# Patient Record
Sex: FEMALE | Race: WHITE | NOT HISPANIC OR LATINO | Employment: OTHER | URBAN - NONMETROPOLITAN AREA
[De-identification: names, ages, dates, MRNs, and addresses within clinical notes are randomized per-mention and may not be internally consistent; named-entity substitution may affect disease eponyms.]

---

## 2017-08-02 ENCOUNTER — TRANSCRIBE ORDERS (OUTPATIENT)
Dept: ADMINISTRATIVE | Facility: HOSPITAL | Age: 67
End: 2017-08-02

## 2017-08-02 DIAGNOSIS — N63.0 LUMP OR MASS IN BREAST: Primary | ICD-10-CM

## 2017-08-09 ENCOUNTER — HOSPITAL ENCOUNTER (OUTPATIENT)
Dept: MAMMOGRAPHY | Facility: HOSPITAL | Age: 67
Discharge: HOME | End: 2017-08-09
Attending: FAMILY MEDICINE | Admitting: FAMILY MEDICINE

## 2017-08-09 ENCOUNTER — HOSPITAL ENCOUNTER (OUTPATIENT)
Dept: ULTRASOUND IMAGING | Facility: HOSPITAL | Age: 67
Discharge: HOME | End: 2017-08-09
Attending: FAMILY MEDICINE

## 2017-08-09 DIAGNOSIS — N63.0 LUMP OR MASS IN BREAST: ICD-10-CM

## 2017-08-09 PROCEDURE — 76642 ULTRASOUND BREAST LIMITED: CPT

## 2017-08-09 PROCEDURE — G0204 DX MAMMO INCL CAD BI: HCPCS

## 2017-08-09 PROCEDURE — G0279 TOMOSYNTHESIS, MAMMO: HCPCS

## 2017-08-18 ENCOUNTER — HOSPITAL ENCOUNTER (OUTPATIENT)
Age: 67
Setting detail: OUTPATIENT SURGERY
Discharge: HOME | End: 2017-08-18
Attending: ORTHOPAEDIC SURGERY | Admitting: ORTHOPAEDIC SURGERY

## 2017-08-18 ENCOUNTER — HOSPITAL ENCOUNTER (OUTPATIENT)
Dept: GENERAL RADIOLOGY | Age: 67
Discharge: HOME | End: 2017-08-18

## 2017-08-18 VITALS
SYSTOLIC BLOOD PRESSURE: 136 MMHG | BODY MASS INDEX: 28.71 KG/M2 | OXYGEN SATURATION: 94 % | HEIGHT: 62 IN | WEIGHT: 156 LBS | RESPIRATION RATE: 20 BRPM | DIASTOLIC BLOOD PRESSURE: 82 MMHG | HEART RATE: 71 BPM

## 2017-08-18 DIAGNOSIS — R52 PAIN: ICD-10-CM

## 2017-08-18 PROBLEM — M16.0 PRIMARY OSTEOARTHRITIS OF BOTH HIPS: Status: ACTIVE | Noted: 2017-08-18

## 2017-08-18 PROCEDURE — 3209999900 FLUORO FOR SURGICAL PROCEDURES

## 2017-08-18 PROCEDURE — 20610 DRAIN/INJ JOINT/BURSA W/O US: CPT

## 2017-08-18 PROCEDURE — G8918 PT W/O PREOP ORDER IV AB PRO: HCPCS

## 2017-08-18 PROCEDURE — G8907 PT DOC NO EVENTS ON DISCHARG: HCPCS

## 2017-08-18 RX ORDER — BUPIVACAINE HYDROCHLORIDE 5 MG/ML
INJECTION, SOLUTION EPIDURAL; INTRACAUDAL PRN
Status: DISCONTINUED | OUTPATIENT
Start: 2017-08-18 | End: 2017-08-18 | Stop reason: HOSPADM

## 2017-08-18 RX ORDER — LIDOCAINE HYDROCHLORIDE 10 MG/ML
INJECTION, SOLUTION INFILTRATION; PERINEURAL PRN
Status: DISCONTINUED | OUTPATIENT
Start: 2017-08-18 | End: 2017-08-18 | Stop reason: HOSPADM

## 2017-08-18 RX ORDER — MODAFINIL 200 MG/1
200 TABLET ORAL DAILY
COMMUNITY
End: 2022-01-10

## 2017-08-18 RX ORDER — QUETIAPINE FUMARATE 200 MG/1
200 TABLET, FILM COATED ORAL 2 TIMES DAILY
COMMUNITY
End: 2022-08-17

## 2017-08-18 RX ORDER — NICOTINE POLACRILEX 4 MG/1
40 GUM, CHEWING ORAL DAILY
COMMUNITY

## 2017-08-18 RX ORDER — MONTELUKAST SODIUM 10 MG/1
10 TABLET ORAL NIGHTLY
COMMUNITY
End: 2022-08-17

## 2017-08-18 RX ORDER — OXYCODONE AND ACETAMINOPHEN 7.5; 325 MG/1; MG/1
1 TABLET ORAL EVERY 4 HOURS PRN
COMMUNITY

## 2017-08-18 RX ORDER — AZELASTINE 1 MG/ML
1 SPRAY, METERED NASAL 2 TIMES DAILY
COMMUNITY
End: 2022-01-10

## 2017-08-18 RX ORDER — GABAPENTIN 300 MG/1
300 CAPSULE ORAL DAILY
COMMUNITY
End: 2022-08-17

## 2017-08-18 RX ORDER — BETAMETHASONE SODIUM PHOSPHATE AND BETAMETHASONE ACETATE 3; 3 MG/ML; MG/ML
INJECTION, SUSPENSION INTRA-ARTICULAR; INTRALESIONAL; INTRAMUSCULAR; SOFT TISSUE PRN
Status: DISCONTINUED | OUTPATIENT
Start: 2017-08-18 | End: 2017-08-18 | Stop reason: HOSPADM

## 2017-08-18 RX ORDER — CYCLOBENZAPRINE HCL 5 MG
5 TABLET ORAL 3 TIMES DAILY PRN
COMMUNITY
End: 2022-08-17 | Stop reason: ALTCHOICE

## 2017-08-18 RX ORDER — LISINOPRIL 20 MG/1
20 TABLET ORAL DAILY
COMMUNITY
End: 2022-01-10

## 2017-08-18 RX ORDER — MESALAMINE 800 MG/1
400 TABLET, DELAYED RELEASE ORAL 3 TIMES DAILY
COMMUNITY
End: 2022-08-17

## 2017-12-10 ENCOUNTER — APPOINTMENT (OUTPATIENT)
Dept: CT IMAGING | Facility: HOSPITAL | Age: 67
End: 2017-12-10

## 2017-12-10 ENCOUNTER — HOSPITAL ENCOUNTER (EMERGENCY)
Facility: HOSPITAL | Age: 67
Discharge: HOME | End: 2017-12-10
Admitting: EMERGENCY MEDICINE

## 2017-12-10 VITALS
WEIGHT: 149 LBS | HEART RATE: 80 BPM | DIASTOLIC BLOOD PRESSURE: 64 MMHG | HEIGHT: 62 IN | OXYGEN SATURATION: 94 % | RESPIRATION RATE: 16 BRPM | BODY MASS INDEX: 27.42 KG/M2 | SYSTOLIC BLOOD PRESSURE: 122 MMHG | TEMPERATURE: 98.3 F

## 2017-12-10 DIAGNOSIS — K04.7 DENTAL ABSCESS: Primary | ICD-10-CM

## 2017-12-10 LAB
ALBUMIN SERPL-MCNC: 4 G/DL (ref 3.5–5)
ALBUMIN/GLOB SERPL: 1.2 G/DL (ref 1.1–2.5)
ALP SERPL-CCNC: 49 U/L (ref 24–120)
ALT SERPL W P-5'-P-CCNC: 52 U/L (ref 0–54)
ANION GAP SERPL CALCULATED.3IONS-SCNC: 10 MMOL/L (ref 4–13)
AST SERPL-CCNC: 53 U/L (ref 7–45)
BASOPHILS # BLD AUTO: 0.03 10*3/MM3 (ref 0–0.2)
BASOPHILS NFR BLD AUTO: 0.3 % (ref 0–2)
BILIRUB SERPL-MCNC: 0.4 MG/DL (ref 0.1–1)
BUN BLD-MCNC: 10 MG/DL (ref 5–21)
BUN/CREAT SERPL: 7.5 (ref 7–25)
CALCIUM SPEC-SCNC: 8.6 MG/DL (ref 8.4–10.4)
CHLORIDE SERPL-SCNC: 93 MMOL/L (ref 98–110)
CO2 SERPL-SCNC: 29 MMOL/L (ref 24–31)
CREAT BLD-MCNC: 1.34 MG/DL (ref 0.5–1.4)
DEPRECATED RDW RBC AUTO: 44.9 FL (ref 40–54)
EOSINOPHIL # BLD AUTO: 0.04 10*3/MM3 (ref 0–0.7)
EOSINOPHIL NFR BLD AUTO: 0.3 % (ref 0–4)
ERYTHROCYTE [DISTWIDTH] IN BLOOD BY AUTOMATED COUNT: 14.7 % (ref 12–15)
GFR SERPL CREATININE-BSD FRML MDRD: 40 ML/MIN/1.73
GLOBULIN UR ELPH-MCNC: 3.3 GM/DL
GLUCOSE BLD-MCNC: 92 MG/DL (ref 70–100)
HCT VFR BLD AUTO: 32.1 % (ref 37–47)
HGB BLD-MCNC: 10.3 G/DL (ref 12–16)
IMM GRANULOCYTES # BLD: 0.03 10*3/MM3 (ref 0–0.03)
IMM GRANULOCYTES NFR BLD: 0.3 % (ref 0–5)
LYMPHOCYTES # BLD AUTO: 0.91 10*3/MM3 (ref 0.72–4.86)
LYMPHOCYTES NFR BLD AUTO: 7.9 % (ref 15–45)
MCH RBC QN AUTO: 26.9 PG (ref 28–32)
MCHC RBC AUTO-ENTMCNC: 32.1 G/DL (ref 33–36)
MCV RBC AUTO: 83.8 FL (ref 82–98)
MONOCYTES # BLD AUTO: 1.59 10*3/MM3 (ref 0.19–1.3)
MONOCYTES NFR BLD AUTO: 13.8 % (ref 4–12)
NEUTROPHILS # BLD AUTO: 8.92 10*3/MM3 (ref 1.87–8.4)
NEUTROPHILS NFR BLD AUTO: 77.4 % (ref 39–78)
NRBC BLD MANUAL-RTO: 0 /100 WBC (ref 0–0)
PLATELET # BLD AUTO: 279 10*3/MM3 (ref 130–400)
PMV BLD AUTO: 10.2 FL (ref 6–12)
POTASSIUM BLD-SCNC: 3.8 MMOL/L (ref 3.5–5.3)
PROT SERPL-MCNC: 7.3 G/DL (ref 6.3–8.7)
RBC # BLD AUTO: 3.83 10*6/MM3 (ref 4.2–5.4)
SODIUM BLD-SCNC: 132 MMOL/L (ref 135–145)
WBC NRBC COR # BLD: 11.52 10*3/MM3 (ref 4.8–10.8)

## 2017-12-10 PROCEDURE — 99284 EMERGENCY DEPT VISIT MOD MDM: CPT

## 2017-12-10 PROCEDURE — 96375 TX/PRO/DX INJ NEW DRUG ADDON: CPT

## 2017-12-10 PROCEDURE — 25010000002 LEVOFLOXACIN PER 250 MG: Performed by: PHYSICIAN ASSISTANT

## 2017-12-10 PROCEDURE — 87070 CULTURE OTHR SPECIMN AEROBIC: CPT | Performed by: PHYSICIAN ASSISTANT

## 2017-12-10 PROCEDURE — 70491 CT SOFT TISSUE NECK W/DYE: CPT

## 2017-12-10 PROCEDURE — 85025 COMPLETE CBC W/AUTO DIFF WBC: CPT | Performed by: PHYSICIAN ASSISTANT

## 2017-12-10 PROCEDURE — 96361 HYDRATE IV INFUSION ADD-ON: CPT

## 2017-12-10 PROCEDURE — 80053 COMPREHEN METABOLIC PANEL: CPT | Performed by: PHYSICIAN ASSISTANT

## 2017-12-10 PROCEDURE — 96365 THER/PROPH/DIAG IV INF INIT: CPT

## 2017-12-10 PROCEDURE — 0 IOPAMIDOL 61 % SOLUTION: Performed by: PHYSICIAN ASSISTANT

## 2017-12-10 PROCEDURE — 87205 SMEAR GRAM STAIN: CPT | Performed by: PHYSICIAN ASSISTANT

## 2017-12-10 RX ORDER — OXYCODONE AND ACETAMINOPHEN 10; 325 MG/1; MG/1
1 TABLET ORAL 2 TIMES DAILY PRN
COMMUNITY

## 2017-12-10 RX ORDER — CHLORHEXIDINE GLUCONATE 0.12 MG/ML
15 RINSE ORAL 4 TIMES DAILY
Qty: 60 ML | Refills: 0 | Status: ON HOLD | OUTPATIENT
Start: 2017-12-10 | End: 2020-11-22

## 2017-12-10 RX ORDER — GABAPENTIN 100 MG/1
100 CAPSULE ORAL NIGHTLY PRN
Status: ON HOLD | COMMUNITY
End: 2020-11-22 | Stop reason: ALTCHOICE

## 2017-12-10 RX ORDER — MELATONIN
2000 DAILY
COMMUNITY

## 2017-12-10 RX ORDER — FENOFIBRATE 145 MG/1
145 TABLET, COATED ORAL DAILY
COMMUNITY
End: 2019-05-08 | Stop reason: ALTCHOICE

## 2017-12-10 RX ORDER — CEPHALEXIN 500 MG/1
500 CAPSULE ORAL 2 TIMES DAILY
Qty: 14 CAPSULE | Refills: 0 | Status: SHIPPED | OUTPATIENT
Start: 2017-12-10 | End: 2017-12-17

## 2017-12-10 RX ORDER — TRAZODONE HYDROCHLORIDE 50 MG/1
50 TABLET ORAL NIGHTLY
COMMUNITY

## 2017-12-10 RX ORDER — DIPHENOXYLATE HYDROCHLORIDE AND ATROPINE SULFATE 2.5; .025 MG/1; MG/1
1 TABLET ORAL 4 TIMES DAILY PRN
COMMUNITY

## 2017-12-10 RX ORDER — QUETIAPINE 200 MG/1
400 TABLET, FILM COATED, EXTENDED RELEASE ORAL NIGHTLY
Status: ON HOLD | COMMUNITY
End: 2020-11-22 | Stop reason: SDUPTHER

## 2017-12-10 RX ORDER — PRAVASTATIN SODIUM 20 MG
40 TABLET ORAL NIGHTLY
COMMUNITY

## 2017-12-10 RX ORDER — LISINOPRIL 20 MG/1
20 TABLET ORAL DAILY
COMMUNITY
End: 2019-05-08 | Stop reason: ALTCHOICE

## 2017-12-10 RX ORDER — LEVOFLOXACIN 5 MG/ML
500 INJECTION, SOLUTION INTRAVENOUS ONCE
Status: COMPLETED | OUTPATIENT
Start: 2017-12-10 | End: 2017-12-10

## 2017-12-10 RX ORDER — MEPERIDINE HYDROCHLORIDE 25 MG/ML
25 INJECTION INTRAMUSCULAR; INTRAVENOUS; SUBCUTANEOUS ONCE
Status: COMPLETED | OUTPATIENT
Start: 2017-12-10 | End: 2017-12-10

## 2017-12-10 RX ORDER — CYCLOBENZAPRINE HCL 5 MG
5 TABLET ORAL 3 TIMES DAILY PRN
COMMUNITY
End: 2019-05-08 | Stop reason: ALTCHOICE

## 2017-12-10 RX ORDER — LEVOFLOXACIN 500 MG/1
500 TABLET, FILM COATED ORAL DAILY
Status: ON HOLD | COMMUNITY
Start: 2017-12-08 | End: 2020-11-22

## 2017-12-10 RX ORDER — CLORAZEPATE DIPOTASSIUM 3.75 MG/1
3.75 TABLET ORAL NIGHTLY
COMMUNITY
End: 2020-11-22 | Stop reason: HOSPADM

## 2017-12-10 RX ORDER — PANTOPRAZOLE SODIUM 40 MG/1
40 TABLET, DELAYED RELEASE ORAL DAILY
COMMUNITY

## 2017-12-10 RX ADMIN — IOPAMIDOL 100 ML: 612 INJECTION, SOLUTION INTRAVENOUS at 18:23

## 2017-12-10 RX ADMIN — MEPERIDINE HYDROCHLORIDE 25 MG: 25 INJECTION INTRAMUSCULAR; INTRAVENOUS; SUBCUTANEOUS at 20:05

## 2017-12-10 RX ADMIN — SODIUM CHLORIDE 1000 ML: 9 INJECTION, SOLUTION INTRAVENOUS at 17:16

## 2017-12-10 RX ADMIN — LEVOFLOXACIN 500 MG: 5 INJECTION, SOLUTION INTRAVENOUS at 18:36

## 2017-12-10 RX ADMIN — TOPICAL ANESTHETIC 2 SPRAY: 200 SPRAY DENTAL; PERIODONTAL at 20:35

## 2017-12-14 LAB
BACTERIA SPEC AEROBE CULT: NORMAL
BACTERIA SPEC AEROBE CULT: NORMAL
GRAM STN SPEC: NORMAL
GRAM STN SPEC: NORMAL

## 2018-12-06 ENCOUNTER — TRANSCRIBE ORDERS (OUTPATIENT)
Dept: ADMINISTRATIVE | Facility: HOSPITAL | Age: 68
End: 2018-12-06

## 2018-12-06 ENCOUNTER — HOSPITAL ENCOUNTER (OUTPATIENT)
Dept: GENERAL RADIOLOGY | Facility: HOSPITAL | Age: 68
Discharge: HOME | End: 2018-12-06
Attending: FAMILY MEDICINE | Admitting: FAMILY MEDICINE

## 2018-12-06 DIAGNOSIS — R07.9 CHEST PAIN, UNSPECIFIED TYPE: Primary | ICD-10-CM

## 2018-12-06 PROCEDURE — 71046 X-RAY EXAM CHEST 2 VIEWS: CPT

## 2019-05-02 ENCOUNTER — TRANSCRIBE ORDERS (OUTPATIENT)
Dept: ADMINISTRATIVE | Facility: HOSPITAL | Age: 69
End: 2019-05-02

## 2019-05-02 DIAGNOSIS — R07.89 OTHER CHEST PAIN: Primary | ICD-10-CM

## 2019-05-02 DIAGNOSIS — M79.602 PAIN OF LEFT ARM: ICD-10-CM

## 2019-05-08 ENCOUNTER — HOSPITAL ENCOUNTER (OUTPATIENT)
Dept: CARDIOLOGY | Facility: HOSPITAL | Age: 69
Discharge: HOME | End: 2019-05-08
Admitting: FAMILY MEDICINE

## 2019-05-08 VITALS
HEART RATE: 90 BPM | RESPIRATION RATE: 20 BRPM | DIASTOLIC BLOOD PRESSURE: 74 MMHG | SYSTOLIC BLOOD PRESSURE: 136 MMHG | HEIGHT: 62 IN | WEIGHT: 163 LBS | BODY MASS INDEX: 30 KG/M2

## 2019-05-08 DIAGNOSIS — M79.602 PAIN OF LEFT ARM: ICD-10-CM

## 2019-05-08 DIAGNOSIS — R07.89 OTHER CHEST PAIN: ICD-10-CM

## 2019-05-08 LAB
BH CV STRESS BP STAGE 1: NORMAL
BH CV STRESS BP STAGE 2: NORMAL
BH CV STRESS BP STAGE 3: NORMAL
BH CV STRESS DOSE DOBUTAMINE STAGE 1: 10
BH CV STRESS DOSE DOBUTAMINE STAGE 2: 20
BH CV STRESS DOSE DOBUTAMINE STAGE 3: 30
BH CV STRESS DURATION MIN STAGE 1: 3
BH CV STRESS DURATION MIN STAGE 2: 3
BH CV STRESS DURATION MIN STAGE 3: 3
BH CV STRESS DURATION SEC STAGE 1: 0
BH CV STRESS DURATION SEC STAGE 2: 0
BH CV STRESS DURATION SEC STAGE 3: 0
BH CV STRESS HR STAGE 1: 90
BH CV STRESS HR STAGE 2: 111
BH CV STRESS HR STAGE 3: 130
BH CV STRESS PROTOCOL 1: NORMAL
BH CV STRESS RECOVERY BP: NORMAL MMHG
BH CV STRESS RECOVERY HR: 92 BPM
BH CV STRESS STAGE 1: 1
BH CV STRESS STAGE 2: 2
BH CV STRESS STAGE 3: 3
MAXIMAL PREDICTED HEART RATE: 153 BPM
PERCENT MAX PREDICTED HR: 84.97 %
STRESS BASELINE BP: NORMAL MMHG
STRESS BASELINE HR: 93 BPM
STRESS PERCENT HR: 100 %
STRESS POST EXERCISE DUR MIN: 9 MIN
STRESS POST EXERCISE DUR SEC: 0 SEC
STRESS POST PEAK BP: NORMAL MMHG
STRESS POST PEAK HR: 130 BPM
STRESS TARGET HR: 130 BPM

## 2019-05-08 PROCEDURE — 93018 CV STRESS TEST I&R ONLY: CPT | Performed by: INTERNAL MEDICINE

## 2019-05-08 PROCEDURE — 93017 CV STRESS TEST TRACING ONLY: CPT

## 2019-05-08 PROCEDURE — 25010000002 PERFLUTREN 6.52 MG/ML SUSPENSION: Performed by: INTERNAL MEDICINE

## 2019-05-08 PROCEDURE — 93350 STRESS TTE ONLY: CPT | Performed by: INTERNAL MEDICINE

## 2019-05-08 PROCEDURE — 25010000002 DOBUTAMINE PER 250 MG: Performed by: INTERNAL MEDICINE

## 2019-05-08 PROCEDURE — 93350 STRESS TTE ONLY: CPT

## 2019-05-08 PROCEDURE — 93352 ADMIN ECG CONTRAST AGENT: CPT | Performed by: INTERNAL MEDICINE

## 2019-05-08 RX ORDER — DOBUTAMINE HYDROCHLORIDE 100 MG/100ML
10-50 INJECTION INTRAVENOUS CONTINUOUS
Status: DISCONTINUED | OUTPATIENT
Start: 2019-05-08 | End: 2019-05-09 | Stop reason: HOSPADM

## 2019-05-08 RX ORDER — CLORAZEPATE DIPOTASSIUM 7.5 MG/1
TABLET ORAL
Status: ON HOLD | COMMUNITY
Start: 2019-05-07 | End: 2020-11-22 | Stop reason: ALTCHOICE

## 2019-05-08 RX ORDER — LISINOPRIL 20 MG/1
20 TABLET ORAL
COMMUNITY

## 2019-05-08 RX ORDER — FENOFIBRATE 160 MG/1
TABLET ORAL NIGHTLY
COMMUNITY
Start: 2019-05-05

## 2019-05-08 RX ORDER — CYCLOSPORINE 0.5 MG/ML
EMULSION OPHTHALMIC
COMMUNITY
Start: 2019-02-13

## 2019-05-08 RX ORDER — GABAPENTIN 100 MG/1
100 CAPSULE ORAL
Status: ON HOLD | COMMUNITY
End: 2020-11-22 | Stop reason: ALTCHOICE

## 2019-05-08 RX ORDER — DOBUTAMINE HYDROCHLORIDE 100 MG/100ML
10-50 INJECTION INTRAVENOUS
Status: DISCONTINUED | OUTPATIENT
Start: 2019-05-08 | End: 2019-05-08

## 2019-05-08 RX ORDER — CYCLOBENZAPRINE HCL 5 MG
5 TABLET ORAL NIGHTLY
COMMUNITY
End: 2020-11-22 | Stop reason: HOSPADM

## 2019-05-08 RX ADMIN — DOBUTAMINE HYDROCHLORIDE 10 MCG/KG/MIN: 100 INJECTION INTRAVENOUS at 13:56

## 2019-05-08 RX ADMIN — PERFLUTREN 8.48 MG: 6.52 INJECTION, SUSPENSION INTRAVENOUS at 13:56

## 2019-05-29 ENCOUNTER — TRANSCRIBE ORDERS (OUTPATIENT)
Dept: CARDIOLOGY | Facility: HOSPITAL | Age: 69
End: 2019-05-29

## 2019-05-29 DIAGNOSIS — Z78.0 POSTMENOPAUSAL: ICD-10-CM

## 2019-05-29 DIAGNOSIS — Z12.39 SCREENING BREAST EXAMINATION: Primary | ICD-10-CM

## 2019-06-04 ENCOUNTER — HOSPITAL ENCOUNTER (OUTPATIENT)
Dept: BONE DENSITY | Facility: HOSPITAL | Age: 69
Discharge: HOME | End: 2019-06-04

## 2019-06-04 ENCOUNTER — HOSPITAL ENCOUNTER (OUTPATIENT)
Dept: MAMMOGRAPHY | Facility: HOSPITAL | Age: 69
Discharge: HOME | End: 2019-06-04
Admitting: FAMILY MEDICINE

## 2019-06-04 ENCOUNTER — HOSPITAL ENCOUNTER (OUTPATIENT)
Dept: GENERAL RADIOLOGY | Facility: HOSPITAL | Age: 69
Discharge: HOME | End: 2019-06-04

## 2019-06-04 ENCOUNTER — TRANSCRIBE ORDERS (OUTPATIENT)
Dept: ADMINISTRATIVE | Facility: HOSPITAL | Age: 69
End: 2019-06-04

## 2019-06-04 DIAGNOSIS — M25.572 PAIN IN LEFT ANKLE AND JOINTS OF LEFT FOOT: ICD-10-CM

## 2019-06-04 DIAGNOSIS — M54.16 RADICULOPATHY, LUMBAR REGION: ICD-10-CM

## 2019-06-04 DIAGNOSIS — M25.571 PAIN IN RIGHT ANKLE AND JOINTS OF RIGHT FOOT: Primary | ICD-10-CM

## 2019-06-04 DIAGNOSIS — M54.12 RADICULOPATHY, CERVICAL REGION: ICD-10-CM

## 2019-06-04 PROCEDURE — 72050 X-RAY EXAM NECK SPINE 4/5VWS: CPT

## 2019-06-04 PROCEDURE — 77067 SCR MAMMO BI INCL CAD: CPT

## 2019-06-04 PROCEDURE — 77080 DXA BONE DENSITY AXIAL: CPT

## 2019-06-04 PROCEDURE — 73630 X-RAY EXAM OF FOOT: CPT

## 2019-06-04 PROCEDURE — 77063 BREAST TOMOSYNTHESIS BI: CPT

## 2019-06-04 PROCEDURE — 72110 X-RAY EXAM L-2 SPINE 4/>VWS: CPT

## 2019-09-04 ENCOUNTER — HOSPITAL ENCOUNTER (OUTPATIENT)
Dept: GENERAL RADIOLOGY | Facility: HOSPITAL | Age: 69
Discharge: HOME | End: 2019-09-04
Admitting: FAMILY MEDICINE

## 2019-09-04 ENCOUNTER — TRANSCRIBE ORDERS (OUTPATIENT)
Dept: ADMINISTRATIVE | Facility: HOSPITAL | Age: 69
End: 2019-09-04

## 2019-09-04 DIAGNOSIS — M54.6 PAIN IN THORACIC SPINE: ICD-10-CM

## 2019-09-04 DIAGNOSIS — M54.6 PAIN IN THORACIC SPINE: Primary | ICD-10-CM

## 2019-09-04 DIAGNOSIS — M54.16 LUMBAR RADICULOPATHY: ICD-10-CM

## 2019-09-04 PROCEDURE — 72110 X-RAY EXAM L-2 SPINE 4/>VWS: CPT

## 2019-09-04 PROCEDURE — 72072 X-RAY EXAM THORAC SPINE 3VWS: CPT

## 2020-06-22 ENCOUNTER — TRANSCRIBE ORDERS (OUTPATIENT)
Dept: ADMINISTRATIVE | Facility: HOSPITAL | Age: 70
End: 2020-06-22

## 2020-06-22 DIAGNOSIS — Z12.31 ENCOUNTER FOR SCREENING MAMMOGRAM FOR MALIGNANT NEOPLASM OF BREAST: Primary | ICD-10-CM

## 2020-07-15 ENCOUNTER — APPOINTMENT (OUTPATIENT)
Dept: MAMMOGRAPHY | Facility: HOSPITAL | Age: 70
End: 2020-07-15

## 2020-11-21 ENCOUNTER — APPOINTMENT (OUTPATIENT)
Dept: CT IMAGING | Facility: HOSPITAL | Age: 70
End: 2020-11-21

## 2020-11-21 ENCOUNTER — HOSPITAL ENCOUNTER (INPATIENT)
Facility: HOSPITAL | Age: 70
LOS: 1 days | Discharge: HOME | End: 2020-11-22
Attending: EMERGENCY MEDICINE | Admitting: FAMILY MEDICINE

## 2020-11-21 ENCOUNTER — APPOINTMENT (OUTPATIENT)
Dept: GENERAL RADIOLOGY | Facility: HOSPITAL | Age: 70
End: 2020-11-21

## 2020-11-21 DIAGNOSIS — T50.901A ACCIDENTAL DRUG OVERDOSE, INITIAL ENCOUNTER: Primary | ICD-10-CM

## 2020-11-21 LAB
ALBUMIN SERPL-MCNC: 4.1 G/DL (ref 3.5–5.2)
ALBUMIN/GLOB SERPL: 1.5 G/DL
ALP SERPL-CCNC: 42 U/L (ref 39–117)
ALT SERPL W P-5'-P-CCNC: 14 U/L (ref 1–33)
AMORPH URATE CRY URNS QL MICRO: ABNORMAL /HPF
AMPHET+METHAMPHET UR QL: NEGATIVE
AMPHETAMINES UR QL: NEGATIVE
ANION GAP SERPL CALCULATED.3IONS-SCNC: 9 MMOL/L (ref 5–15)
AST SERPL-CCNC: 22 U/L (ref 1–32)
BACTERIA UR QL AUTO: ABNORMAL /HPF
BARBITURATES UR QL SCN: NEGATIVE
BASOPHILS # BLD AUTO: 0.05 10*3/MM3 (ref 0–0.2)
BASOPHILS NFR BLD AUTO: 0.4 % (ref 0–1.5)
BENZODIAZ UR QL SCN: POSITIVE
BILIRUB SERPL-MCNC: 0.5 MG/DL (ref 0–1.2)
BILIRUB UR QL STRIP: NEGATIVE
BUN SERPL-MCNC: 21 MG/DL (ref 8–23)
BUN/CREAT SERPL: 13.7 (ref 7–25)
BUPRENORPHINE SERPL-MCNC: NEGATIVE NG/ML
CALCIUM SPEC-SCNC: 9.3 MG/DL (ref 8.6–10.5)
CANNABINOIDS SERPL QL: NEGATIVE
CHLORIDE SERPL-SCNC: 91 MMOL/L (ref 98–107)
CLARITY UR: CLEAR
CO2 SERPL-SCNC: 27 MMOL/L (ref 22–29)
COCAINE UR QL: NEGATIVE
COLOR UR: YELLOW
CREAT SERPL-MCNC: 1.53 MG/DL (ref 0.57–1)
DEPRECATED RDW RBC AUTO: 40.8 FL (ref 37–54)
EOSINOPHIL # BLD AUTO: 0.08 10*3/MM3 (ref 0–0.4)
EOSINOPHIL NFR BLD AUTO: 0.6 % (ref 0.3–6.2)
ERYTHROCYTE [DISTWIDTH] IN BLOOD BY AUTOMATED COUNT: 13.6 % (ref 12.3–15.4)
ETHANOL UR QL: <0.01 %
GFR SERPL CREATININE-BSD FRML MDRD: 34 ML/MIN/1.73
GLOBULIN UR ELPH-MCNC: 2.8 GM/DL
GLUCOSE SERPL-MCNC: 139 MG/DL (ref 65–99)
GLUCOSE UR STRIP-MCNC: NEGATIVE MG/DL
HCT VFR BLD AUTO: 32.6 % (ref 34–46.6)
HGB BLD-MCNC: 11.1 G/DL (ref 12–15.9)
HGB UR QL STRIP.AUTO: NEGATIVE
HYALINE CASTS UR QL AUTO: ABNORMAL /LPF
IMM GRANULOCYTES # BLD AUTO: 0.04 10*3/MM3 (ref 0–0.05)
IMM GRANULOCYTES NFR BLD AUTO: 0.3 % (ref 0–0.5)
KETONES UR QL STRIP: NEGATIVE
LEUKOCYTE ESTERASE UR QL STRIP.AUTO: ABNORMAL
LYMPHOCYTES # BLD AUTO: 1.12 10*3/MM3 (ref 0.7–3.1)
LYMPHOCYTES NFR BLD AUTO: 8.1 % (ref 19.6–45.3)
MCH RBC QN AUTO: 28.3 PG (ref 26.6–33)
MCHC RBC AUTO-ENTMCNC: 34 G/DL (ref 31.5–35.7)
MCV RBC AUTO: 83.2 FL (ref 79–97)
METHADONE UR QL SCN: NEGATIVE
MONOCYTES # BLD AUTO: 0.98 10*3/MM3 (ref 0.1–0.9)
MONOCYTES NFR BLD AUTO: 7.1 % (ref 5–12)
MUCOUS THREADS URNS QL MICRO: ABNORMAL /HPF
NEUTROPHILS NFR BLD AUTO: 11.52 10*3/MM3 (ref 1.7–7)
NEUTROPHILS NFR BLD AUTO: 83.5 % (ref 42.7–76)
NITRITE UR QL STRIP: NEGATIVE
NRBC BLD AUTO-RTO: 0 /100 WBC (ref 0–0.2)
OPIATES UR QL: NEGATIVE
OXYCODONE UR QL SCN: POSITIVE
PCP UR QL SCN: NEGATIVE
PH UR STRIP.AUTO: 6 [PH] (ref 5–8)
PLATELET # BLD AUTO: 299 10*3/MM3 (ref 140–450)
PMV BLD AUTO: 10 FL (ref 6–12)
POTASSIUM SERPL-SCNC: 4.2 MMOL/L (ref 3.5–5.2)
PROPOXYPH UR QL: NEGATIVE
PROT SERPL-MCNC: 6.9 G/DL (ref 6–8.5)
PROT UR QL STRIP: NEGATIVE
RBC # BLD AUTO: 3.92 10*6/MM3 (ref 3.77–5.28)
RBC # UR: ABNORMAL /HPF
REF LAB TEST METHOD: ABNORMAL
RENAL EPI CELLS #/AREA URNS HPF: ABNORMAL /HPF
SARS-COV-2 RNA PNL SPEC NAA+PROBE: NOT DETECTED
SODIUM SERPL-SCNC: 127 MMOL/L (ref 136–145)
SP GR UR STRIP: 1.01 (ref 1–1.03)
SQUAMOUS #/AREA URNS HPF: ABNORMAL /HPF
TRICYCLICS UR QL SCN: POSITIVE
UROBILINOGEN UR QL STRIP: ABNORMAL
WBC # BLD AUTO: 13.79 10*3/MM3 (ref 3.4–10.8)
WBC UR QL AUTO: ABNORMAL /HPF
YEAST URNS QL MICRO: ABNORMAL /HPF

## 2020-11-21 PROCEDURE — 99285 EMERGENCY DEPT VISIT HI MDM: CPT

## 2020-11-21 PROCEDURE — 87086 URINE CULTURE/COLONY COUNT: CPT | Performed by: EMERGENCY MEDICINE

## 2020-11-21 PROCEDURE — 93010 ELECTROCARDIOGRAM REPORT: CPT | Performed by: INTERNAL MEDICINE

## 2020-11-21 PROCEDURE — 25010000002 LEVOFLOXACIN PER 250 MG: Performed by: INTERNAL MEDICINE

## 2020-11-21 PROCEDURE — 85025 COMPLETE CBC W/AUTO DIFF WBC: CPT | Performed by: EMERGENCY MEDICINE

## 2020-11-21 PROCEDURE — 80053 COMPREHEN METABOLIC PANEL: CPT | Performed by: EMERGENCY MEDICINE

## 2020-11-21 PROCEDURE — 84443 ASSAY THYROID STIM HORMONE: CPT | Performed by: INTERNAL MEDICINE

## 2020-11-21 PROCEDURE — 87635 SARS-COV-2 COVID-19 AMP PRB: CPT | Performed by: EMERGENCY MEDICINE

## 2020-11-21 PROCEDURE — 81001 URINALYSIS AUTO W/SCOPE: CPT | Performed by: EMERGENCY MEDICINE

## 2020-11-21 PROCEDURE — 83735 ASSAY OF MAGNESIUM: CPT | Performed by: INTERNAL MEDICINE

## 2020-11-21 PROCEDURE — 70450 CT HEAD/BRAIN W/O DYE: CPT

## 2020-11-21 PROCEDURE — 80307 DRUG TEST PRSMV CHEM ANLYZR: CPT | Performed by: EMERGENCY MEDICINE

## 2020-11-21 PROCEDURE — 71045 X-RAY EXAM CHEST 1 VIEW: CPT

## 2020-11-21 PROCEDURE — P9612 CATHETERIZE FOR URINE SPEC: HCPCS

## 2020-11-21 PROCEDURE — 93005 ELECTROCARDIOGRAM TRACING: CPT | Performed by: EMERGENCY MEDICINE

## 2020-11-21 RX ORDER — SODIUM CHLORIDE 9 MG/ML
125 INJECTION, SOLUTION INTRAVENOUS CONTINUOUS
Status: DISCONTINUED | OUTPATIENT
Start: 2020-11-21 | End: 2020-11-22 | Stop reason: HOSPADM

## 2020-11-21 RX ORDER — SODIUM CHLORIDE 0.9 % (FLUSH) 0.9 %
10 SYRINGE (ML) INJECTION AS NEEDED
Status: DISCONTINUED | OUTPATIENT
Start: 2020-11-21 | End: 2020-11-22 | Stop reason: HOSPADM

## 2020-11-21 RX ORDER — LEVOFLOXACIN 5 MG/ML
250 INJECTION, SOLUTION INTRAVENOUS EVERY 24 HOURS
Status: DISCONTINUED | OUTPATIENT
Start: 2020-11-21 | End: 2020-11-22 | Stop reason: HOSPADM

## 2020-11-21 RX ADMIN — SODIUM CHLORIDE 125 ML/HR: 9 INJECTION, SOLUTION INTRAVENOUS at 21:18

## 2020-11-21 RX ADMIN — LEVOFLOXACIN 250 MG: 5 INJECTION, SOLUTION INTRAVENOUS at 23:14

## 2020-11-22 ENCOUNTER — APPOINTMENT (OUTPATIENT)
Dept: MRI IMAGING | Facility: HOSPITAL | Age: 70
End: 2020-11-22

## 2020-11-22 VITALS
HEART RATE: 82 BPM | TEMPERATURE: 97.6 F | DIASTOLIC BLOOD PRESSURE: 71 MMHG | OXYGEN SATURATION: 95 % | BODY MASS INDEX: 30.66 KG/M2 | HEIGHT: 62 IN | WEIGHT: 166.6 LBS | RESPIRATION RATE: 16 BRPM | SYSTOLIC BLOOD PRESSURE: 145 MMHG

## 2020-11-22 PROBLEM — K21.9 GASTROESOPHAGEAL REFLUX DISEASE: Status: ACTIVE | Noted: 2020-02-10

## 2020-11-22 PROBLEM — E87.1 HYPONATREMIA: Status: ACTIVE | Noted: 2020-11-22

## 2020-11-22 PROBLEM — A49.9 UTI (URINARY TRACT INFECTION), BACTERIAL: Status: ACTIVE | Noted: 2020-11-22

## 2020-11-22 PROBLEM — M19.90 ARTHRITIS: Status: ACTIVE | Noted: 2020-02-10

## 2020-11-22 PROBLEM — J45.909 ASTHMA: Status: ACTIVE | Noted: 2020-02-10

## 2020-11-22 PROBLEM — R82.81 PYURIA: Status: ACTIVE | Noted: 2020-11-22

## 2020-11-22 PROBLEM — N18.32 STAGE 3B CHRONIC KIDNEY DISEASE: Status: ACTIVE | Noted: 2020-11-22

## 2020-11-22 PROBLEM — F41.9 ANXIETY DISORDER: Status: ACTIVE | Noted: 2020-02-10

## 2020-11-22 PROBLEM — N39.0 UTI (URINARY TRACT INFECTION), BACTERIAL: Status: ACTIVE | Noted: 2020-11-22

## 2020-11-22 PROBLEM — N17.9 AKI (ACUTE KIDNEY INJURY): Status: ACTIVE | Noted: 2020-11-22

## 2020-11-22 LAB
ALBUMIN SERPL-MCNC: 4 G/DL (ref 3.5–5.2)
ALBUMIN/GLOB SERPL: 1.4 G/DL
ALP SERPL-CCNC: 43 U/L (ref 39–117)
ALT SERPL W P-5'-P-CCNC: 16 U/L (ref 1–33)
ANION GAP SERPL CALCULATED.3IONS-SCNC: 8 MMOL/L (ref 5–15)
AST SERPL-CCNC: 29 U/L (ref 1–32)
BASOPHILS # BLD AUTO: 0.05 10*3/MM3 (ref 0–0.2)
BASOPHILS NFR BLD AUTO: 0.3 % (ref 0–1.5)
BILIRUB SERPL-MCNC: 0.4 MG/DL (ref 0–1.2)
BUN SERPL-MCNC: 17 MG/DL (ref 8–23)
BUN/CREAT SERPL: 13.7 (ref 7–25)
CALCIUM SPEC-SCNC: 9.2 MG/DL (ref 8.6–10.5)
CHLORIDE SERPL-SCNC: 96 MMOL/L (ref 98–107)
CO2 SERPL-SCNC: 26 MMOL/L (ref 22–29)
CREAT SERPL-MCNC: 1.24 MG/DL (ref 0.57–1)
DEPRECATED RDW RBC AUTO: 40.4 FL (ref 37–54)
EOSINOPHIL # BLD AUTO: 0.02 10*3/MM3 (ref 0–0.4)
EOSINOPHIL NFR BLD AUTO: 0.1 % (ref 0.3–6.2)
ERYTHROCYTE [DISTWIDTH] IN BLOOD BY AUTOMATED COUNT: 13.5 % (ref 12.3–15.4)
GFR SERPL CREATININE-BSD FRML MDRD: 43 ML/MIN/1.73
GLOBULIN UR ELPH-MCNC: 2.9 GM/DL
GLUCOSE SERPL-MCNC: 116 MG/DL (ref 65–99)
HCT VFR BLD AUTO: 33.7 % (ref 34–46.6)
HGB BLD-MCNC: 11.5 G/DL (ref 12–15.9)
IMM GRANULOCYTES # BLD AUTO: 0.06 10*3/MM3 (ref 0–0.05)
IMM GRANULOCYTES NFR BLD AUTO: 0.4 % (ref 0–0.5)
LYMPHOCYTES # BLD AUTO: 1.51 10*3/MM3 (ref 0.7–3.1)
LYMPHOCYTES NFR BLD AUTO: 8.9 % (ref 19.6–45.3)
MAGNESIUM SERPL-MCNC: 1.8 MG/DL (ref 1.6–2.4)
MCH RBC QN AUTO: 28.1 PG (ref 26.6–33)
MCHC RBC AUTO-ENTMCNC: 34.1 G/DL (ref 31.5–35.7)
MCV RBC AUTO: 82.4 FL (ref 79–97)
MONOCYTES # BLD AUTO: 1.12 10*3/MM3 (ref 0.1–0.9)
MONOCYTES NFR BLD AUTO: 6.6 % (ref 5–12)
NEUTROPHILS NFR BLD AUTO: 14.23 10*3/MM3 (ref 1.7–7)
NEUTROPHILS NFR BLD AUTO: 83.7 % (ref 42.7–76)
NRBC BLD AUTO-RTO: 0 /100 WBC (ref 0–0.2)
PLATELET # BLD AUTO: 314 10*3/MM3 (ref 140–450)
PMV BLD AUTO: 10.2 FL (ref 6–12)
POTASSIUM SERPL-SCNC: 4.8 MMOL/L (ref 3.5–5.2)
PROT SERPL-MCNC: 6.9 G/DL (ref 6–8.5)
RBC # BLD AUTO: 4.09 10*6/MM3 (ref 3.77–5.28)
SODIUM SERPL-SCNC: 130 MMOL/L (ref 136–145)
TSH SERPL DL<=0.05 MIU/L-ACNC: 1.31 UIU/ML (ref 0.27–4.2)
WBC # BLD AUTO: 16.99 10*3/MM3 (ref 3.4–10.8)

## 2020-11-22 PROCEDURE — 80053 COMPREHEN METABOLIC PANEL: CPT | Performed by: INTERNAL MEDICINE

## 2020-11-22 PROCEDURE — 70551 MRI BRAIN STEM W/O DYE: CPT

## 2020-11-22 PROCEDURE — 85025 COMPLETE CBC W/AUTO DIFF WBC: CPT | Performed by: INTERNAL MEDICINE

## 2020-11-22 PROCEDURE — 25010000002 ENOXAPARIN PER 10 MG: Performed by: INTERNAL MEDICINE

## 2020-11-22 RX ORDER — ACETAMINOPHEN 325 MG/1
650 TABLET ORAL EVERY 4 HOURS PRN
Status: DISCONTINUED | OUTPATIENT
Start: 2020-11-22 | End: 2020-11-22 | Stop reason: HOSPADM

## 2020-11-22 RX ORDER — FENOFIBRATE 145 MG/1
145 TABLET, COATED ORAL DAILY
Status: DISCONTINUED | OUTPATIENT
Start: 2020-11-22 | End: 2020-11-22 | Stop reason: HOSPADM

## 2020-11-22 RX ORDER — FLUTICASONE PROPIONATE 50 MCG
2 SPRAY, SUSPENSION (ML) NASAL DAILY
Status: DISCONTINUED | OUTPATIENT
Start: 2020-11-22 | End: 2020-11-22 | Stop reason: HOSPADM

## 2020-11-22 RX ORDER — SODIUM CHLORIDE 0.9 % (FLUSH) 0.9 %
10 SYRINGE (ML) INJECTION EVERY 12 HOURS SCHEDULED
Status: DISCONTINUED | OUTPATIENT
Start: 2020-11-22 | End: 2020-11-22 | Stop reason: HOSPADM

## 2020-11-22 RX ORDER — LEVOFLOXACIN 5 MG/ML
250 INJECTION, SOLUTION INTRAVENOUS EVERY 24 HOURS
Status: DISCONTINUED | OUTPATIENT
Start: 2020-11-22 | End: 2020-11-22 | Stop reason: SDUPTHER

## 2020-11-22 RX ORDER — SODIUM CHLORIDE 0.9 % (FLUSH) 0.9 %
10 SYRINGE (ML) INJECTION AS NEEDED
Status: DISCONTINUED | OUTPATIENT
Start: 2020-11-22 | End: 2020-11-22 | Stop reason: HOSPADM

## 2020-11-22 RX ORDER — ACETAMINOPHEN 650 MG/1
650 SUPPOSITORY RECTAL EVERY 4 HOURS PRN
Status: DISCONTINUED | OUTPATIENT
Start: 2020-11-22 | End: 2020-11-22 | Stop reason: HOSPADM

## 2020-11-22 RX ORDER — PANTOPRAZOLE SODIUM 40 MG/1
40 TABLET, DELAYED RELEASE ORAL DAILY
Status: DISCONTINUED | OUTPATIENT
Start: 2020-11-22 | End: 2020-11-22 | Stop reason: HOSPADM

## 2020-11-22 RX ORDER — CHLORHEXIDINE GLUCONATE 0.12 MG/ML
15 RINSE ORAL 4 TIMES DAILY
Status: DISCONTINUED | OUTPATIENT
Start: 2020-11-22 | End: 2020-11-22 | Stop reason: HOSPADM

## 2020-11-22 RX ORDER — CEFDINIR 300 MG/1
300 CAPSULE ORAL 2 TIMES DAILY
Qty: 6 CAPSULE | Refills: 0 | Status: SHIPPED | OUTPATIENT
Start: 2020-11-22 | End: 2020-11-25

## 2020-11-22 RX ORDER — QUETIAPINE 200 MG/1
200 TABLET, FILM COATED, EXTENDED RELEASE ORAL NIGHTLY
Start: 2020-11-22 | End: 2022-10-04

## 2020-11-22 RX ORDER — PRAVASTATIN SODIUM 20 MG
20 TABLET ORAL NIGHTLY
Status: DISCONTINUED | OUTPATIENT
Start: 2020-11-22 | End: 2020-11-22 | Stop reason: HOSPADM

## 2020-11-22 RX ORDER — CYCLOSPORINE 0.5 MG/ML
1 EMULSION OPHTHALMIC 2 TIMES DAILY
Status: DISCONTINUED | OUTPATIENT
Start: 2020-11-22 | End: 2020-11-22 | Stop reason: HOSPADM

## 2020-11-22 RX ORDER — MELATONIN
2000 DAILY
Status: DISCONTINUED | OUTPATIENT
Start: 2020-11-22 | End: 2020-11-22 | Stop reason: HOSPADM

## 2020-11-22 RX ORDER — GABAPENTIN 300 MG/1
300 CAPSULE ORAL NIGHTLY
COMMUNITY
End: 2022-10-04

## 2020-11-22 RX ORDER — ONDANSETRON 2 MG/ML
4 INJECTION INTRAMUSCULAR; INTRAVENOUS EVERY 6 HOURS PRN
Status: DISCONTINUED | OUTPATIENT
Start: 2020-11-22 | End: 2020-11-22 | Stop reason: HOSPADM

## 2020-11-22 RX ORDER — DIPHENOXYLATE HYDROCHLORIDE AND ATROPINE SULFATE 2.5; .025 MG/1; MG/1
1 TABLET ORAL 4 TIMES DAILY PRN
Status: DISCONTINUED | OUTPATIENT
Start: 2020-11-22 | End: 2020-11-22 | Stop reason: HOSPADM

## 2020-11-22 RX ORDER — ACETAMINOPHEN 160 MG/5ML
650 SOLUTION ORAL EVERY 4 HOURS PRN
Status: DISCONTINUED | OUTPATIENT
Start: 2020-11-22 | End: 2020-11-22 | Stop reason: HOSPADM

## 2020-11-22 RX ORDER — ONDANSETRON 4 MG/1
4 TABLET, FILM COATED ORAL EVERY 6 HOURS PRN
Status: DISCONTINUED | OUTPATIENT
Start: 2020-11-22 | End: 2020-11-22 | Stop reason: HOSPADM

## 2020-11-22 RX ADMIN — PANTOPRAZOLE SODIUM 40 MG: 40 TABLET, DELAYED RELEASE ORAL at 08:36

## 2020-11-22 RX ADMIN — CHLORHEXIDINE GLUCONATE 0.12% ORAL RINSE 15 ML: 1.2 LIQUID ORAL at 12:36

## 2020-11-22 RX ADMIN — SODIUM CHLORIDE 125 ML/HR: 9 INJECTION, SOLUTION INTRAVENOUS at 05:18

## 2020-11-22 RX ADMIN — MESALAMINE 1000 MG: 250 CAPSULE ORAL at 08:32

## 2020-11-22 RX ADMIN — ACETAMINOPHEN 650 MG: 325 TABLET, FILM COATED ORAL at 09:53

## 2020-11-22 RX ADMIN — CHLORHEXIDINE GLUCONATE 0.12% ORAL RINSE 15 ML: 1.2 LIQUID ORAL at 08:37

## 2020-11-22 RX ADMIN — CYCLOSPORINE 1 DROP: 0.5 EMULSION OPHTHALMIC at 08:30

## 2020-11-22 RX ADMIN — FENOFIBRATE 145 MG: 145 TABLET ORAL at 08:30

## 2020-11-22 RX ADMIN — ENOXAPARIN SODIUM 40 MG: 40 INJECTION SUBCUTANEOUS at 08:36

## 2020-11-22 RX ADMIN — CHOLECALCIFEROL (VITAMIN D3) 25 MCG (1,000 UNIT) TABLET 2000 UNITS: TABLET at 08:31

## 2020-11-22 RX ADMIN — MESALAMINE 1000 MG: 250 CAPSULE ORAL at 12:36

## 2020-11-22 RX ADMIN — ESTROGENS, CONJUGATED 0.3 MG: 0.3 TABLET, FILM COATED ORAL at 08:30

## 2020-11-22 NOTE — DISCHARGE SUMMARY
[facility] Medicine Services  DISCHARGE SUMMARY     Date of Admission: 11/21/2020  Date of Discharge:  11/22/2020  Primary Care Physician: Kisha Byrne DO    Presenting Problem/History of Present Illness:  Accidental drug overdose, initial encounter [T50.813A]     Discharge Diagnoses:  Active Hospital Problems    Diagnosis   • **Accidental drug overdose   • UTI (urinary tract infection), bacterial   • Chronic hyponatremia suspect secondary to excessive water consumption.   • MITCHELL (acute kidney injury) (CMS/[facility])   • Stage 3b chronic kidney disease   • Pyuria   • Clotting disorder (CMS/[facility])   • Hypertension   • Restless leg syndrome   • Fibromyalgia   • Anxiety disorder   • Arthritis     Chief Complaint on Day of Discharge: No complaints.  At baseline.  History of Present Illness on Day of Discharge:   Sitting up in bed.   in room.  No oxygen in use.  Patient denies any balance issues.  She denies confusion.  Patient reports she believes that she took her medications twice yesterday.  She sleeps a lot through the day.  Discussed with patient and  both regarding decreasing doses of medications that she is taking and further discussing with primary care provider decreasing dose of gabapentin after discharge.  Denies nausea, vomiting or abdominal pain.  Denies chest pain, palpitations, or shortness of breath.    Consults: None    Procedures Performed: None    Pertinent Test Results:     Laboratory Data Last 7 Days:  Results from last 7 days   Lab Units 11/22/20  0243 11/21/20 2117   WBC 10*3/mm3 16.99* 13.79*   HEMOGLOBIN g/dL 11.5* 11.1*   HEMATOCRIT % 33.7* 32.6*   PLATELETS 10*3/mm3 314 299     Results from last 7 days   Lab Units 11/22/20  0243 11/21/20 2117   SODIUM mmol/L 130* 127*   POTASSIUM mmol/L 4.8 4.2   CHLORIDE mmol/L 96* 91*   CO2 mmol/L 26.0 27.0   BUN mg/dL 17 21   CREATININE mg/dL 1.24* 1.53*   GLUCOSE mg/dL 116* 139*   CALCIUM mg/dL 9.2 9.3    ALT (SGPT) U/L 16 14     Laboratory Data Last 7 Days:    Lab Results (last 7 days)     Procedure Component Value Units Date/Time    Urine Culture - Urine, Urine, Catheter [[id number]]  (Normal) Collected: 11/21/20 2133    Specimen: Urine, Catheter Updated: 11/22/20 1217     Urine Culture Culture in progress    Comprehensive Metabolic Panel [[id number]]  (Abnormal) Collected: 11/22/20 0243    Specimen: Blood Updated: 11/22/20 0336     Glucose 116 mg/dL      BUN 17 mg/dL      Creatinine 1.24 mg/dL      Sodium 130 mmol/L      Potassium 4.8 mmol/L      Comment: Slight hemolysis detected by analyzer. Results may be affected.        Chloride 96 mmol/L      CO2 26.0 mmol/L      Calcium 9.2 mg/dL      Total Protein 6.9 g/dL      Albumin 4.00 g/dL      ALT (SGPT) 16 U/L      AST (SGOT) 29 U/L      Comment: Slight hemolysis detected by analyzer. Results may be affected.        Alkaline Phosphatase 43 U/L      Total Bilirubin 0.4 mg/dL      eGFR Non African Amer 43 mL/min/1.73      Globulin 2.9 gm/dL      A/G Ratio 1.4 g/dL      BUN/Creatinine Ratio 13.7     Anion Gap 8.0 mmol/L     Narrative:      GFR Normal >60  Chronic Kidney Disease <60  Kidney Failure <15      CBC Auto Differential [[id number]]  (Abnormal) Collected: 11/22/20 0243    Specimen: Blood Updated: 11/22/20 0318     WBC 16.99 10*3/mm3      RBC 4.09 10*6/mm3      Hemoglobin 11.5 g/dL      Hematocrit 33.7 %      MCV 82.4 fL      MCH 28.1 pg      MCHC 34.1 g/dL      RDW 13.5 %      RDW-SD 40.4 fl      MPV 10.2 fL      Platelets 314 10*3/mm3      Neutrophil % 83.7 %      Lymphocyte % 8.9 %      Monocyte % 6.6 %      Eosinophil % 0.1 %      Basophil % 0.3 %      Immature Grans % 0.4 %      Neutrophils, Absolute 14.23 10*3/mm3      Lymphocytes, Absolute 1.51 10*3/mm3      Monocytes, Absolute 1.12 10*3/mm3      Eosinophils, Absolute 0.02 10*3/mm3      Basophils, Absolute 0.05 10*3/mm3      Immature Grans, Absolute 0.06 10*3/mm3      nRBC 0.0 /100 WBC     Magnesium  [[id number]]  (Normal) Collected: 11/21/20 2117    Specimen: Blood Updated: 11/22/20 0126     Magnesium 1.8 mg/dL     TSH [[id number]]  (Normal) Collected: 11/21/20 2117    Specimen: Blood Updated: 11/22/20 0126     TSH 1.310 uIU/mL     COVID PRE-OP / PRE-PROCEDURE SCREENING ORDER (NO ISOLATION) - Swab, Nasal Cavity [[id number]]  (Normal) Collected: 11/21/20 2231    Specimen: Swab from Nasal Cavity Updated: 11/21/20 2320    Narrative:      The following orders were created for panel order COVID PRE-OP / PRE-PROCEDURE SCREENING ORDER (NO ISOLATION) - Swab, Nasal Cavity.  Procedure                               Abnormality         Status                     ---------                               -----------         ------                     COVID-19,[facility]-BR[facility]...[[id number]]  Normal              Final result                 Please view results for these tests on the individual orders.    COVID-19,[facility] IN-HOUSE,Nasal Swab No Transport Media 3-4 HR TAT - Swab, Nasal Cavity [[id number]]  (Normal) Collected: 11/21/20 2231    Specimen: Swab from Nasal Cavity Updated: 11/21/20 2320     COVID19 Not Detected    Narrative:      Fact sheet for providers: https://www.fda.gov/media/844641/download     Fact sheet for patients: https://www.fda.gov/media/734365/download    Comprehensive Metabolic Panel [[id number]]  (Abnormal) Collected: 11/21/20 2117    Specimen: Blood Updated: 11/21/20 2156     Glucose 139 mg/dL      BUN 21 mg/dL      Creatinine 1.53 mg/dL      Sodium 127 mmol/L      Potassium 4.2 mmol/L      Chloride 91 mmol/L      CO2 27.0 mmol/L      Calcium 9.3 mg/dL      Total Protein 6.9 g/dL      Albumin 4.10 g/dL      ALT (SGPT) 14 U/L      AST (SGOT) 22 U/L      Alkaline Phosphatase 42 U/L      Total Bilirubin 0.5 mg/dL      eGFR Non African Amer 34 mL/min/1.73      Globulin 2.8 gm/dL      A/G Ratio 1.5 g/dL      BUN/Creatinine Ratio 13.7     Anion Gap 9.0 mmol/L     Narrative:      GFR Normal >60  Chronic Kidney  Disease <60  Kidney Failure <15      Urinalysis With Culture If Indicated - Urine, Catheter [[id number]]  (Abnormal) Collected: 11/21/20 2133    Specimen: Urine, Catheter Updated: 11/21/20 2155     Color, UA Yellow     Appearance, UA Clear     pH, UA 6.0     Specific Gravity, UA 1.010     Glucose, UA Negative     Ketones, UA Negative     Bilirubin, UA Negative     Blood, UA Negative     Protein, UA Negative     Leuk Esterase, UA Large (3+)     Nitrite, UA Negative     Urobilinogen, UA 0.2 E.U./dL    Urinalysis, Microscopic Only - Urine, Catheter [[id number]]  (Abnormal) Collected: 11/21/20 2133    Specimen: Urine, Catheter Updated: 11/21/20 2155     RBC, UA 0-2 /HPF      WBC, UA 6-12 /HPF      Bacteria, UA Trace /HPF      Squamous Epithelial Cells, UA 3-6 /HPF      Renal Epithelial Cells, UA 3-6 /HPF      Yeast, UA Small/1+ Yeast /HPF      Hyaline Casts, UA 0-2 /LPF      Amorphous Crystals, UA Small/1+ /HPF      Mucus, UA Trace /HPF      Methodology Manual Light Microscopy    Ethanol [[id number]] Collected: 11/21/20 2117    Specimen: Blood Updated: 11/21/20 2151     Ethanol % <0.010 %     Narrative:      Not for legal purposes. Chain of Custody not followed.     Urine Drug Screen - Urine, Catheter [[id number]]  (Abnormal) Collected: 11/21/20 2133    Specimen: Urine, Catheter Updated: 11/21/20 2150     THC, Screen, Urine Negative     Phencyclidine (PCP), Urine Negative     Cocaine Screen, Urine Negative     Methamphetamine, Ur Negative     Opiate Screen Negative     Amphetamine Screen, Urine Negative     Benzodiazepine Screen, Urine Positive     Tricyclic Antidepressants Screen Positive     Methadone Screen, Urine Negative     Barbiturates Screen, Urine Negative     Oxycodone Screen, Urine Positive     Propoxyphene Screen Negative     Buprenorphine, Screen, Urine Negative    Narrative:      Cutoff For Drugs Screened:    Amphetamines               500 ng/ml  Barbiturates               200 ng/ml  Benzodiazepines             150 ng/ml  Cocaine                    150 ng/ml  Methadone                  200 ng/ml  Opiates                    100 ng/ml  Phencyclidine               25 ng/ml  THC                            50 ng/ml  Methamphetamine            500 ng/ml  Tricyclic Antidepressants  300 ng/ml  Oxycodone                  100 ng/ml  Propoxyphene               300 ng/ml  Buprenorphine               10 ng/ml    The normal value for all drugs tested is negative. This report includes unconfirmed screening results, with the cutoff values listed, to be used for medical treatment purposes only.  Unconfirmed results must not be used for non-medical purposes such as employment or legal testing.  Clinical consideration should be applied to any drug of abuse test, particularly when unconfirmed results are used.      CBC & Differential [[id number]]  (Abnormal) Collected: 11/21/20 2117    Specimen: Blood Updated: 11/21/20 2139    Narrative:      The following orders were created for panel order CBC & Differential.  Procedure                               Abnormality         Status                     ---------                               -----------         ------                     CBC Auto Differential[[id number]]        Abnormal            Final result                 Please view results for these tests on the individual orders.    CBC Auto Differential [[id number]]  (Abnormal) Collected: 11/21/20 2117    Specimen: Blood Updated: 11/21/20 2139     WBC 13.79 10*3/mm3      RBC 3.92 10*6/mm3      Hemoglobin 11.1 g/dL      Hematocrit 32.6 %      MCV 83.2 fL      MCH 28.3 pg      MCHC 34.0 g/dL      RDW 13.6 %      RDW-SD 40.8 fl      MPV 10.0 fL      Platelets 299 10*3/mm3      Neutrophil % 83.5 %      Lymphocyte % 8.1 %      Monocyte % 7.1 %      Eosinophil % 0.6 %      Basophil % 0.4 %      Immature Grans % 0.3 %      Neutrophils, Absolute 11.52 10*3/mm3      Lymphocytes, Absolute 1.12 10*3/mm3      Monocytes, Absolute 0.98 10*3/mm3       Eosinophils, Absolute 0.08 10*3/mm3      Basophils, Absolute 0.05 10*3/mm3      Immature Grans, Absolute 0.04 10*3/mm3      nRBC 0.0 /100 WBC         Culture Data:   No results found for: BLOODCX   Urine Culture   Date Value Ref Range Status   11/21/2020 Culture in progress  Preliminary    No results found for: WOUNDCX No results found for: MRSACX No results found for: RESPCX No results found for: STOOLCX   Imaging Results (All)     Procedure Component Value Units Date/Time    MRI Brain Without Contrast [[id number]] Collected: 11/22/20 1454     Updated: 11/22/20 1501    Narrative:      HISTORY: Word finding difficulties     MRI BRAIN: Multiplanar imaging the brain performed without contrast.     COMPARISON: CT exam 11/21/2020     FINDINGS: There is no abnormal diffusion restriction to indicate acute  ischemia. There is no intracranial hemorrhage. Nonspecific hyperintense  FLAIR signal seen at the gray-white matter interface of the right  frontal and left parietal lobes. Minimal hyperintense FLAIR signal  changes within the periventricular region. No intracranial mass. No  abnormal extra-axial fluid collection. Cerebellar tonsils position above  the foramen magnum. No sellar mass. Corpus callosum appears intact.     Limited assessment of the orbits and base of skull is unremarkable. No  air-fluid level seen within the paranasal sinuses. Normal flow-voids in  the distal internal carotid and basilar arteries.       Impression:      1. No acute signs of ischemia, hemorrhage, or mass.  2. Nonspecific hyperintense FLAIR signal changes at the gray-white  matter/subcortical white matter of the left parietal and right frontal  lobes with minimal hyperintense periventricular FLAIR signal. Findings  are nonspecific. Short-term follow-up MRI exam 3-6 months could be  performed if clinically desired.  This report was finalized on 11/22/2020 14:58 by Dr. Kisha Schaffer MD.    CT Head Without Contrast [[id number]] Collected:  11/22/20 0809     Updated: 11/22/20 0814    Narrative:      CT HEAD WO CONTRAST- 11/21/2020 11:28 PM CST     HISTORY: Mental status change, alcohol/drug use      COMPARISON: 8/8/2013     DOSE LENGTH PRODUCT: 579 mGy cm. Automated exposure control was also  utilized to decrease patient radiation dose.     TECHNIQUE: Axial images of brain obtained without contrast.     FINDINGS:  Ventricles are normal in size and configuration. No  intracranial hemorrhage or mass effect is identified. No acute signs of  ischemia. No extra-axial hematoma or subarachnoid hemorrhage.     The visible paranasal sinuses and mastoid air cells are well-aerated. No  depressed skull fracture.       Impression:      1. No acute intracranial abnormality.     Comments: A preliminary report is issued to the ER by the [facility]  radiology service. I agree with this preliminary impression.  This report was finalized on 11/22/2020 08:11 by Dr. Kisha Schaffer MD.    XR Chest 1 View [[id number]] Collected: 11/21/20 2125     Updated: 11/21/20 2128    Narrative:      Frontal upright radiograph of the chest 11/21/2020 9:20 PM CST     COMPARISON: December 6, 2018.     HISTORY: Weakness.     FINDINGS:   The lungs are clear. The cardiomediastinal silhouette and pulmonary  vascularity are within normal limits.      The osseous structures and surrounding soft tissues demonstrate no acute  abnormality.       Impression:      1. No radiographic evidence of acute cardiopulmonary process.        This report was finalized on 11/21/2020 21:25 by Dr. Toro Thrasher MD.        Hospital Course  Patient is a 70 y.o. female presented to [facility] emergency room 11/21/2020 by EMS with  after finding patient unresponsive.   reported he felt as though patient had taken too much of her pain medication.  No cough, congestion, fever, chills, vomiting, diarrhea.  EMS noted blood pressure was low and patient pupils were pinpoint.  IV was started and she  received IV fluids, Narcan and began to respond.  Patient was alert and responsive upon arrival to the emergency room.  She was complaining of pain in her legs.  Patient takes Neurontin and admitted that she may have taken too much of her medications.  She did not remember much that happened earlier in the day other than both patient and  felt as though she had taken her medications twice.  Patient reported to Dr. Harris that she recently had symptoms of dysuria but denied fever, chills, vomiting or diarrhea.  Urine drug screen positive for oxycodone, benzodiazepines, tricyclic's.  Urinalysis 6-12 WBC, trace bacteria, 3-6 squamous cells.  Chest x-ray no acute cardiopulmonary process.  CT scan of the head no acute intracranial abnormality.  Normal saline, Levaquin given in ER.    She was admitted to the telemetry floor with suspected unintentional drug overdose as patient and  felt as though she took her medications twice.  She was hydrated with IV fluids.  Sedating medications held.  Chronic hyponatremia.  Sodium 127 on admission and improved to 130 at discharge.  Sodium 132 on 12/10/2017.  Patient indicated that she drank 101 ounces of water daily.  Discussed limiting water intake to avoid hyponatremia with patient and she was agreeable.    Creatinine 1.53 on admission and improved to 1.24.  Creatinine 1.34 on12/10/2017.    Urinalysis 6-12 WBCs, trace bacteria, 3-6 squamous cells.  Patient did report mild dysuria.  Initially placed on Levaquin and transition to oral Omnicef.    History of hypertension.  Lisinopril held on admission as blood pressure 95/60 100/59.  Blood pressure trended to 145/71 lisinopril resumed at discharge.    Patient and  again both felt as though patient had taken her medications more than once throughout the day.  Discussed with patient to decrease Seroquel 400 mg down to 200 mg nightly, discontinue tranxene, use 1 Percocet rather than 2 Percocets for pain.  Also,  "discussed with patient to further discuss with primary care provider regarding decreasing dose of Neurontin from 300 mg perhaps to 100 mg.  Patient and  are agreeable to discuss with primary care provider.    On 11/22/2020 she is stable for discharge home.  CT scan of the head and MRI of the brain negative.  Patient able to ambulate without unstable gait.  No word finding issues.  Moves all extremities equally.  Follows all commands.  Sedating medication dosing adjusted.  Follow-up with primary care provider, Dr. Kisha Byrne in 1 week with basic metabolic panel.    Physical Exam on Discharge:  /71 (BP Location: Right arm, Patient Position: Lying)   Pulse 82   Temp 97.6 °F (36.4 °C) (Oral)   Resp 16   Ht 157.5 cm (62\")   Wt 75.6 kg (166 lb 9.6 oz)   SpO2 95%   BMI 30.47 kg/m²   Physical Exam  Vitals signs reviewed.   Constitutional:       Comments: Sitting up in bed.   in room.  No oxygen in use.   HENT:      Head: Normocephalic and atraumatic.      Nose: No congestion.      Mouth/Throat:      Pharynx: Oropharynx is clear. No oropharyngeal exudate.   Eyes:      Pupils: Pupils are equal, round, and reactive to light.   Neck:      Musculoskeletal: Normal range of motion and neck supple.   Cardiovascular:      Rate and Rhythm: Normal rate and regular rhythm.      Pulses: Normal pulses.      Heart sounds: Normal heart sounds. No murmur. No gallop.       Comments: Normal sinus rhythm 72-97 on telemetry.  Pulmonary:      Effort: Pulmonary effort is normal.      Breath sounds: Normal breath sounds.      Comments: No oxygen in use.  Abdominal:      Palpations: Abdomen is soft.   Genitourinary:     Comments: Voiding.  Musculoskeletal:         General: No swelling or tenderness.   Skin:     General: Skin is warm and dry.   Neurological:      General: No focal deficit present.      Mental Status: She is alert and oriented to person, place, and time.      Comments: Moves all extremities equally.  " Follows commands.  No word finding issues.  No speech hesitation or slurred speech.  No facial asymmetry.   Psychiatric:         Mood and Affect: Mood normal.         Behavior: Behavior normal.         Thought Content: Thought content normal.         Judgment: Judgment normal.       Condition on Discharge: Stable    Discharge Disposition: Home with     Discharge Diet:   Diet Instructions     Advance Diet As Tolerated          Activity at Discharge:   Activity Instructions     Activity as Tolerated          Discharge Care Plan / Instructions:   1.  Decrease Seroquel to 200 mg orally nightly.  2.  Discontinue tranxene  3.  Recommend taking 1 Percocet at a time not to Percocet.  4.  Discussed with primary care provider decreasing dose of Neurontin.  5.  Follow-up with Dr. Kisha Byrne 1 week with basic metabolic panel.  6.  Omnicef 300 mg twice daily for 3 days.    Discharge Medications:     Discharge Medications      New Medications      Instructions Start Date   cefdinir 300 MG capsule  Commonly known as: OMNICEF   300 mg, Oral, 2 Times Daily         Changes to Medications      Instructions Start Date   QUEtiapine  MG 24 hr tablet  Commonly known as: SEROquel XR  What changed: how much to take   200 mg, Oral, Nightly         Continue These Medications      Instructions Start Date   cholecalciferol 25 MCG (1000 UT) tablet  Commonly known as: VITAMIN D3   2,000 Units, Oral, Daily      diphenoxylate-atropine 2.5-0.025 MG per tablet  Commonly known as: LOMOTIL   1 tablet, Oral, 4 Times Daily PRN      fenofibrate 160 MG tablet   Nightly      gabapentin 300 MG capsule  Commonly known as: NEURONTIN   300 mg, Oral, Nightly      lisinopril 20 MG tablet  Commonly known as: PRINIVIL,ZESTRIL   20 mg, Oral      multivitamin with minerals tablet tablet   Oral, Nightly      oxyCODONE-acetaminophen  MG per tablet  Commonly known as: PERCOCET   1 tablet, Oral, 2 Times Daily PRN      pantoprazole 40 MG EC  tablet  Commonly known as: PROTONIX   40 mg, Oral, Daily      pravastatin 20 MG tablet  Commonly known as: PRAVACHOL   20 mg, Oral, Nightly      Restasis 0.05 % ophthalmic emulsion  Generic drug: cycloSPORINE   No dose, route, or frequency recorded.      traZODone 50 MG tablet  Commonly known as: DESYREL   50 mg, Oral, Nightly         Stop These Medications    clorazepate 3.75 MG tablet  Commonly known as: TRANXENE     cyclobenzaprine 5 MG tablet  Commonly known as: FLEXERIL          Follow-up Appointments:   Follow-up Information     Kisha Byrne, DO Follow up.    Specialty: Family Medicine  Why: 1 week with basic metabolic panel  Contact information:  [address] [facility] [address]  [facility] [id number] [phone]                 Future Appointments:  No future appointments.    Test Results Pending at Discharge: None    The above documentation resulted from a face-to-face encounter by me Shannon SU, [facility].    Electronically signed by GEORGES Coyle, 11/22/2020, 15:29 CST.    Time: This discharge process required 35 minutes for completion.    Plan discussed with Dr. Mendoza, patient, and .    Time spent in face-to-face evaluation, chart review, planning and education 35 minutes.    .I personally evaluated and examined the patient in conjunction with GOERGES Ferris and agree with the assessment, treatment plan, and disposition of the patient as recorded by her. My history, exam, and further recommendations are: I have reviewed and agree with the plans. Kt.       Electronically signed by Eugene Mendoza MD, 11/22/2020, 18:42 CST.

## 2020-11-22 NOTE — H&P
[facility] Medicine Services  HISTORY AND PHYSICAL    Date of Admission: 11/21/2020  Primary Care Physician: Kisha Byrne DO    Subjective     Chief Complaint: Altered unresponsive    History of Present Illness  Patient is a 70-year-old white female past medical history of restless leg syndrome chronic pain syndrome anxiety.  Apparently she was found tonight unresponsive by her  who called EMS.  He was concerned that she taken too much of her pain medication or other meds.  She had a low blood pressure and was slightly hypoxic pinpoint pupils when EMS arrived.  They gave her fluids and Narcan she started to respond.  She was transported here she does admit that she may have taken accidentally too much of her medication.  The question is whether the oxycodone versus Neurontin.  She has very poor memory of the events throughout the day.  She has a questionable UTI she does state that she is recently had symptoms of dysuria.  She has no fevers chills nausea vomiting or diarrhea.  She has a normal chest x-ray as well as CT of the head.  Her UDS is positive for oxycodone, benzodiazepines, and tricyclic's.  She has 6-12 white cells trace bacteria in her urine although she does have 3-6 squamous epithelial cells as well.  So it is a questionable UA.  However, given her symptoms it is concerning for UTI.  She does also have a bit of an MITCHELL and mild hyponatremia.        Review of Systems   14 point review of systems negative except for as per HPI    Otherwise complete ROS reviewed and negative except as mentioned in the HPI.    Past Medical History:   Past Medical History:   Diagnosis Date   • Clotting disorder (CMS/HCC)    • Hyperlipidemia    • Hypertension    • Restless leg syndrome      Past Surgical History:  Past Surgical History:   Procedure Laterality Date   • BREAST EXCISIONAL BIOPSY Left     Dr. Dahl   • HYSTERECTOMY       Social History:  reports that she  quit smoking about 20 years ago. She does not have any smokeless tobacco history on file. She reports that she does not drink alcohol or use drugs.    Family History: family history includes Breast cancer in her maternal cousin.       Allergies:  Allergies   Allergen Reactions   • Metronidazole Unknown - Low Severity   • Nitrofurantoin Unknown - Low Severity   • Azithromycin Rash   • Penicillins Rash   • Sulfa Antibiotics Rash     Medications:  Prior to Admission medications    Medication Sig Start Date End Date Taking? Authorizing Provider   chlorhexidine (PERIDEX) 0.12 % solution Apply 15 mL to the mouth or throat 4 (Four) Times a Day. 12/10/17   Pascual Goldberg PA-C   cholecalciferol (VITAMIN D3) 1000 units tablet Take 2,000 Units by mouth Daily.    Cosme Soria MD   clorazepate (TRANXENE) 3.75 MG tablet Take 3.75 mg by mouth 3 (Three) Times a Day.    Cosme Soria MD   clorazepate (TRANXENE) 7.5 MG tablet  5/7/19   Cosme Soria MD   cyclobenzaprine (FLEXERIL) 5 MG tablet Take 5 mg by mouth.    Cosme Soria MD   diphenoxylate-atropine (LOMOTIL) 2.5-0.025 MG per tablet Take 1 tablet by mouth 4 (Four) Times a Day As Needed for Diarrhea.    Cosme Soria MD   estrogens, conjugated, (PREMARIN) 0.3 MG tablet Take 0.3 mg by mouth Daily. Take daily for 21 days then do not take for 7 days.    Cosme Soria MD   fenofibrate 160 MG tablet  5/5/19   Cosme Soria MD   FLUTICASONE PROPIONATE, NASAL, NA into each nostril.    Cosme Soria MD   gabapentin (NEURONTIN) 100 MG capsule Take 100 mg by mouth At Night As Needed.    Cosme Soria MD   gabapentin (NEURONTIN) 100 MG capsule Take 100 mg by mouth.    Cosme Soria MD   levoFLOXacin (LEVAQUIN) 500 MG tablet Take 500 mg by mouth Daily. 12/8/17   Cosme Soria MD   lisinopril (PRINIVIL,ZESTRIL) 20 MG tablet Take 20 mg by mouth.    Cosme Soria MD   mesalamine (PENTASA) 250  "MG CR capsule Take 1,000 mg by mouth 4 (Four) Times a Day.    Cosme Soria MD   Multiple Vitamins-Minerals (CENTRUM SILVER PO) Take  by mouth.    Cosme Soria MD   oxyCODONE-acetaminophen (PERCOCET)  MG per tablet Take 1 tablet by mouth 2 (Two) Times a Day As Needed for Moderate Pain .    Cosme Soria MD   pantoprazole (PROTONIX) 40 MG EC tablet Take 40 mg by mouth Daily.    Cosme Soria MD   pravastatin (PRAVACHOL) 20 MG tablet Take 20 mg by mouth Every Night.    Cosme Soria MD   QUEtiapine XR (SEROquel XR) 200 MG 24 hr tablet Take 400 mg by mouth Every Night.    Cosme Soria MD   RESTASIS 0.05 % ophthalmic emulsion  2/13/19   Cosme Soria MD   traZODone (DESYREL) 50 MG tablet Take 50 mg by mouth Every Night.    Cosme Soria MD     Objective     Vital Signs: /63   Pulse 92   Temp 100.3 °F (37.9 °C)   Resp 20   Ht 160 cm (63\")   Wt 77.6 kg (171 lb)   SpO2 94%   BMI 30.29 kg/m²   Physical Exam   Gen.:  Well-nourished well-developed white female in no acute distress with some slurring of her speech  HEENT: Atraumatic, normocephalic.  Pupils equal, round, and reactive to light. Extraocular movements intact.  Sclerae anicteric.  External ears negative.  Mucous membranes moist.  Neck is supple without lymphadenopathy.  No JVD is noted.  No carotid bruits are auscultated.  Oropharynx is without erythema or exudate.   Chest: Clear to auscultation and percussion.  CV: Regular rate and rhythm.  Normal S1-S2.  No gallops, 2/6 systolic ejection murmur left upper sternal border no rubs.  Abdomen: Soft, nontender, nondistended.  Active bowel sounds,  No hepatosplenomegaly.  No masses.  No hernias.  Extremities: No clubbing, edema, or cyanosis.  Capillary refill is normal.  Pulses are 2+ and symmetric at radial and dorsalis pedis.  Posterior tibial pulses are intact and 2+ palpable.  Neuro: Patient is awake, alert, and oriented ×4.  " Cranial nerves II through XII are grossly intact.  Motor is 5 out of 5 bilaterally.  DTRs are 2+ and symmetric bilaterally. Sensory exam is nonfocal  Skin: Warm, dry, and intact.  No evidence of breakdown.  Sensorium: She is alert and oriented x4 however she does have some confusion at times    Nursing notes and vital signs reviewed        Results Reviewed:  Lab Results (last 24 hours)     Procedure Component Value Units Date/Time    COVID PRE-OP / PRE-PROCEDURE SCREENING ORDER (NO ISOLATION) - Swab, Nasal Cavity [[id number]]  (Normal) Collected: 11/21/20 2231    Specimen: Swab from Nasal Cavity Updated: 11/21/20 2320    Narrative:      The following orders were created for panel order COVID PRE-OP / PRE-PROCEDURE SCREENING ORDER (NO ISOLATION) - Swab, Nasal Cavity.  Procedure                               Abnormality         Status                     ---------                               -----------         ------                     COVID-19,[facility]...[[id number]]  Normal              Final result                 Please view results for these tests on the individual orders.    COVID-19,[facility] IN-HOUSE,Nasal Swab No Transport Media 3-4 HR TAT - Swab, Nasal Cavity [[id number]]  (Normal) Collected: 11/21/20 2231    Specimen: Swab from Nasal Cavity Updated: 11/21/20 2320     COVID19 Not Detected    Narrative:      Fact sheet for providers: https://www.fda.gov/media/369741/download     Fact sheet for patients: https://www.fda.gov/media/993030/download    Comprehensive Metabolic Panel [[id number]]  (Abnormal) Collected: 11/21/20 2117    Specimen: Blood Updated: 11/21/20 2156     Glucose 139 mg/dL      BUN 21 mg/dL      Creatinine 1.53 mg/dL      Sodium 127 mmol/L      Potassium 4.2 mmol/L      Chloride 91 mmol/L      CO2 27.0 mmol/L      Calcium 9.3 mg/dL      Total Protein 6.9 g/dL      Albumin 4.10 g/dL      ALT (SGPT) 14 U/L      AST (SGOT) 22 U/L      Alkaline Phosphatase 42 U/L      Total Bilirubin 0.5  mg/dL      eGFR Non African Amer 34 mL/min/1.73      Globulin 2.8 gm/dL      A/G Ratio 1.5 g/dL      BUN/Creatinine Ratio 13.7     Anion Gap 9.0 mmol/L     Narrative:      GFR Normal >60  Chronic Kidney Disease <60  Kidney Failure <15      Urinalysis With Culture If Indicated - Urine, Catheter [[id number]]  (Abnormal) Collected: 11/21/20 2133    Specimen: Urine, Catheter Updated: 11/21/20 2155     Color, UA Yellow     Appearance, UA Clear     pH, UA 6.0     Specific Gravity, UA 1.010     Glucose, UA Negative     Ketones, UA Negative     Bilirubin, UA Negative     Blood, UA Negative     Protein, UA Negative     Leuk Esterase, UA Large (3+)     Nitrite, UA Negative     Urobilinogen, UA 0.2 E.U./dL    Urinalysis, Microscopic Only - Urine, Catheter [[id number]]  (Abnormal) Collected: 11/21/20 2133    Specimen: Urine, Catheter Updated: 11/21/20 2155     RBC, UA 0-2 /HPF      WBC, UA 6-12 /HPF      Bacteria, UA Trace /HPF      Squamous Epithelial Cells, UA 3-6 /HPF      Renal Epithelial Cells, UA 3-6 /HPF      Yeast, UA Small/1+ Yeast /HPF      Hyaline Casts, UA 0-2 /LPF      Amorphous Crystals, UA Small/1+ /HPF      Mucus, UA Trace /HPF      Methodology Manual Light Microscopy    Urine Culture - Urine, Urine, Catheter [[id number]] Collected: 11/21/20 2133    Specimen: Urine, Catheter Updated: 11/21/20 2155    Ethanol [[id number]] Collected: 11/21/20 2117    Specimen: Blood Updated: 11/21/20 2151     Ethanol % <0.010 %     Narrative:      Not for legal purposes. Chain of Custody not followed.     Urine Drug Screen - Urine, Catheter [[id number]]  (Abnormal) Collected: 11/21/20 2133    Specimen: Urine, Catheter Updated: 11/21/20 2150     THC, Screen, Urine Negative     Phencyclidine (PCP), Urine Negative     Cocaine Screen, Urine Negative     Methamphetamine, Ur Negative     Opiate Screen Negative     Amphetamine Screen, Urine Negative     Benzodiazepine Screen, Urine Positive     Tricyclic Antidepressants Screen Positive      Methadone Screen, Urine Negative     Barbiturates Screen, Urine Negative     Oxycodone Screen, Urine Positive     Propoxyphene Screen Negative     Buprenorphine, Screen, Urine Negative    Narrative:      Cutoff For Drugs Screened:    Amphetamines               500 ng/ml  Barbiturates               200 ng/ml  Benzodiazepines            150 ng/ml  Cocaine                    150 ng/ml  Methadone                  200 ng/ml  Opiates                    100 ng/ml  Phencyclidine               25 ng/ml  THC                            50 ng/ml  Methamphetamine            500 ng/ml  Tricyclic Antidepressants  300 ng/ml  Oxycodone                  100 ng/ml  Propoxyphene               300 ng/ml  Buprenorphine               10 ng/ml    The normal value for all drugs tested is negative. This report includes unconfirmed screening results, with the cutoff values listed, to be used for medical treatment purposes only.  Unconfirmed results must not be used for non-medical purposes such as employment or legal testing.  Clinical consideration should be applied to any drug of abuse test, particularly when unconfirmed results are used.      CBC & Differential [[id number]]  (Abnormal) Collected: 11/21/20 2117    Specimen: Blood Updated: 11/21/20 2139    Narrative:      The following orders were created for panel order CBC & Differential.  Procedure                               Abnormality         Status                     ---------                               -----------         ------                     CBC Auto Differential[[id number]]        Abnormal            Final result                 Please view results for these tests on the individual orders.    CBC Auto Differential [[id number]]  (Abnormal) Collected: 11/21/20 2117    Specimen: Blood Updated: 11/21/20 2139     WBC 13.79 10*3/mm3      RBC 3.92 10*6/mm3      Hemoglobin 11.1 g/dL      Hematocrit 32.6 %      MCV 83.2 fL      MCH 28.3 pg      MCHC 34.0 g/dL      RDW 13.6 %       RDW-SD 40.8 fl      MPV 10.0 fL      Platelets 299 10*3/mm3      Neutrophil % 83.5 %      Lymphocyte % 8.1 %      Monocyte % 7.1 %      Eosinophil % 0.6 %      Basophil % 0.4 %      Immature Grans % 0.3 %      Neutrophils, Absolute 11.52 10*3/mm3      Lymphocytes, Absolute 1.12 10*3/mm3      Monocytes, Absolute 0.98 10*3/mm3      Eosinophils, Absolute 0.08 10*3/mm3      Basophils, Absolute 0.05 10*3/mm3      Immature Grans, Absolute 0.04 10*3/mm3      nRBC 0.0 /100 WBC         Imaging Results (Last 24 Hours)     Procedure Component Value Units Date/Time    CT Head Without Contrast [[id number]] Resulted: 11/21/20 2328     Updated: 11/21/20 2336    XR Chest 1 View [[id number]] Collected: 11/21/20 2125     Updated: 11/21/20 2128    Narrative:      Frontal upright radiograph of the chest 11/21/2020 9:20 PM CST     COMPARISON: December 6, 2018.     HISTORY: Weakness.     FINDINGS:   The lungs are clear. The cardiomediastinal silhouette and pulmonary  vascularity are within normal limits.      The osseous structures and surrounding soft tissues demonstrate no acute  abnormality.       Impression:      1. No radiographic evidence of acute cardiopulmonary process.        This report was finalized on 11/21/2020 21:25 by Dr. Toro Thrasher MD.        I have personally reviewed and interpreted the radiology studies and ECG obtained at time of admission.     Assessment / Plan     Assessment:   Active Hospital Problems    Diagnosis   • **Accidental drug overdose   • UTI (urinary tract infection), bacterial   • Hyponatremia   • MITCHELL (acute kidney injury) (CMS/[facility]ly [facility])   • Clotting disorder (CMS/[facility])   • Hypertension   • Restless leg syndrome   • Fibromyalgia   • Anxiety disorder   • Arthritis   1.  Accidental drug overdose plans to admit patient hold her sedating medications hydrate and monitor.  Repeat labs in a.m.  2.  MITCHELL probably due to poor p.o. intake hydrate with IV fluids recheck labs in a.m.  3.  Hyponatremia we will give  normal saline once again repeat labs in a.m.  4.  UTI she has had symptoms her urine is somewhat abnormal we will give her Levaquin due to numerous allergies check cultures of urine follow them.  5.  Hypertension blood pressure slightly soft currently will hold her lisinopril hydrate with IV fluids recheck in a.m.        Patient seen prior to midnight.         Code Status: Full     I discussed the patient's findings and my recommendations with the patient.  She designates her  as her surrogate healthcare decision maker.    Estimated length of stay 1 night.    Patient seen and examined by me on November 21, 2020 at 11:55 PM.    Electronically signed by Armando Harris MD, 11/22/20, 00:29 CST.

## 2020-11-22 NOTE — PROGRESS NOTES
Discharge Planning Assessment  [facility]     Patient Name: Shannon Bridges  MRN: [id number]  Today's Date: 11/22/2020    Admit Date: 11/21/2020    Discharge Needs Assessment     Row Name 11/22/20 1415       Living Environment    Lives With  spouse    Current Living Arrangements  home/apartment/condo    Primary Care Provided by  self    Provides Primary Care For  no one    Family Caregiver if Needed  spouse    Quality of Family Relationships  involved;helpful;supportive    Able to Return to Prior Arrangements  yes       Resource/Environmental Concerns    Resource/Environmental Concerns  none    Transportation Concerns  car, none       Transition Planning    Patient/Family Anticipates Transition to  home    Patient/Family Anticipated Services at Transition  none    Transportation Anticipated  family or friend will provide       Discharge Needs Assessment    Readmission Within the Last 30 Days  no previous admission in last 30 days    Equipment Currently Used at Home  none    Concerns to be Addressed  no discharge needs identified;denies needs/concerns at this time    Anticipated Changes Related to Illness  none    Equipment Needed After Discharge  none    Discharge Coordination/Progress  Pt has RX coverage and a PCP. Pt lived with her  prior to admission and plans on returning when medically stable. SW will follow for possible discharge needs.        Discharge Plan    No documentation.       Continued Care and Services - Admitted Since 11/21/2020    Coordination has not been started for this encounter.         Demographic Summary    No documentation.       Functional Status    No documentation.       Psychosocial    No documentation.       Abuse/Neglect    No documentation.       Legal    No documentation.       Substance Abuse    No documentation.       Patient Forms    No documentation.           Jennifer Calvin

## 2020-11-22 NOTE — PLAN OF CARE
Goal Outcome Evaluation:  Plan of Care Reviewed With: patient  Progress: improving  Outcome Summary: VSS, pt more alert this am, no c/o pain, bedrest overnight, safety maintained, possible DC to home today if back to baseline, cont to monitor for changes.

## 2020-11-22 NOTE — ED PROVIDER NOTES
Subjective   Patient is brought to emergency room by ambulance from home with  called after finding the patient unresponsive.  He felt like she had taken too much of her pain medication.  There is no report of fever or cough or congestion or vomiting or diarrhea or head trauma or other problems.  When EMS got there she had a low blood pressure and was somewhat hypoxic and pupils were pinpoint.  They started an IV and gave her some fluids and gave her some Narcan and then she began to respond.  She is now alert and responsive and is complaining of pain mostly in her legs.  She says she takes Neurontin for that and admits that she may have taken too much of her medication that she was returning.  She first tells me she does not member anything earlier today but then goes on to tell her that she may have taken too much of her medication.      History provided by:  Patient   used: No    Altered Mental Status  Presenting symptoms: unresponsiveness    Severity:  Severe  Most recent episode:  Today  Episode history:  Single  Duration:  1 hour  Timing:  Constant  Progression:  Partially resolved  Chronicity:  New  Context: not taking medications as prescribed    Context: not alcohol use, not dementia, not drug use, not head injury, not homeless, not nursing home resident, not recent change in medication, not recent illness and not recent infection    Associated symptoms: weakness    Associated symptoms: no abdominal pain, normal movement, no agitation, no bladder incontinence, no decreased appetite, no depression, no difficulty breathing, no eye deviation, no fever, no hallucinations, no headaches, no light-headedness, no nausea, no palpitations, no rash, no seizures, no slurred speech, no suicidal behavior, no visual change and no vomiting        Review of Systems   Constitutional: Negative for decreased appetite and fever.   HENT: Negative.    Respiratory: Negative.    Cardiovascular: Negative.   Negative for palpitations.   Gastrointestinal: Negative.  Negative for abdominal pain, nausea and vomiting.   Genitourinary: Negative.  Negative for bladder incontinence.   Musculoskeletal: Negative.    Skin: Negative.  Negative for rash.   Neurological: Positive for weakness. Negative for seizures, light-headedness and headaches.   Hematological: Negative.    Psychiatric/Behavioral: Negative for agitation and hallucinations.   All other systems reviewed and are negative.      Past Medical History:   Diagnosis Date   • Clotting disorder (CMS/HCC)    • Hyperlipidemia    • Hypertension    • Restless leg syndrome        Allergies   Allergen Reactions   • Azithromycin Rash   • Penicillins Rash   • Sulfa Antibiotics Rash       Past Surgical History:   Procedure Laterality Date   • BREAST EXCISIONAL BIOPSY Left     Dr. Dahl   • HYSTERECTOMY         Family History   Problem Relation Age of Onset   • Breast cancer Maternal Cousin        Social History     Socioeconomic History   • Marital status:      Spouse name: Not on file   • Number of children: Not on file   • Years of education: Not on file   • Highest education level: Not on file   Tobacco Use   • Smoking status: Former Smoker     Quit date:      Years since quittin.9   Substance and Sexual Activity   • Alcohol use: No   • Drug use: No       Prior to Admission medications    Medication Sig Start Date End Date Taking? Authorizing Provider   chlorhexidine (PERIDEX) 0.12 % solution Apply 15 mL to the mouth or throat 4 (Four) Times a Day. 12/10/17   Pascual Goldberg PA-C   cholecalciferol (VITAMIN D3) 1000 units tablet Take 2,000 Units by mouth Daily.    Cosme Soria MD   clorazepate (TRANXENE) 3.75 MG tablet Take 3.75 mg by mouth 3 (Three) Times a Day.    Cosme Soria MD   clorazepate (TRANXENE) 7.5 MG tablet  19   Cosme Soria MD   cyclobenzaprine (FLEXERIL) 5 MG tablet Take 5 mg by mouth.    Cosme Soria MD    diphenoxylate-atropine (LOMOTIL) 2.5-0.025 MG per tablet Take 1 tablet by mouth 4 (Four) Times a Day As Needed for Diarrhea.    Cosme Soria MD   estrogens, conjugated, (PREMARIN) 0.3 MG tablet Take 0.3 mg by mouth Daily. Take daily for 21 days then do not take for 7 days.    Cosme Soria MD   fenofibrate 160 MG tablet  5/5/19   Cosme Soria MD   FLUTICASONE PROPIONATE, NASAL, NA into each nostril.    Cosme Soria MD   gabapentin (NEURONTIN) 100 MG capsule Take 100 mg by mouth At Night As Needed.    Cosme Soria MD   gabapentin (NEURONTIN) 100 MG capsule Take 100 mg by mouth.    Cosme Soria MD   levoFLOXacin (LEVAQUIN) 500 MG tablet Take 500 mg by mouth Daily. 12/8/17   Cosme Soria MD   lisinopril (PRINIVIL,ZESTRIL) 20 MG tablet Take 20 mg by mouth.    Cosme Soria MD   mesalamine (PENTASA) 250 MG CR capsule Take 1,000 mg by mouth 4 (Four) Times a Day.    Cosme Soria MD   Multiple Vitamins-Minerals (CENTRUM SILVER PO) Take  by mouth.    Cosme Soria MD   oxyCODONE-acetaminophen (PERCOCET)  MG per tablet Take 1 tablet by mouth 2 (Two) Times a Day As Needed for Moderate Pain .    Cosme Soria MD   pantoprazole (PROTONIX) 40 MG EC tablet Take 40 mg by mouth Daily.    Cosme Soria MD   pravastatin (PRAVACHOL) 20 MG tablet Take 20 mg by mouth Every Night.    Cosme Soria MD   QUEtiapine XR (SEROquel XR) 200 MG 24 hr tablet Take 400 mg by mouth Every Night.    Cosme Soria MD   RESTASIS 0.05 % ophthalmic emulsion  2/13/19   Cosme Soria MD   traZODone (DESYREL) 50 MG tablet Take 50 mg by mouth Every Night.    Cosme Soria MD       Medications   sodium chloride 0.9 % flush 10 mL (has no administration in time range)   sodium chloride 0.9 % infusion (125 mL/hr Intravenous New Bag 11/21/20 4308)   levoFLOXacin (LEVAQUIN) 250 mg/50 mL D5W (premix) 250 mg (250 mg Intravenous  New Bag 11/21/20 2314)       Vitals:    11/21/20 2300   BP: 92/54   Pulse: 88   Resp:    Temp:    SpO2: 95%         Objective   Physical Exam  Vitals signs and nursing note reviewed.   Constitutional:       Appearance: She is well-developed.   HENT:      Head: Normocephalic and atraumatic.   Eyes:      Extraocular Movements: Extraocular movements intact.      Pupils: Pupils are equal, round, and reactive to light.   Neck:      Musculoskeletal: Normal range of motion and neck supple.   Cardiovascular:      Rate and Rhythm: Normal rate and regular rhythm.   Pulmonary:      Effort: Pulmonary effort is normal.      Breath sounds: Normal breath sounds.   Abdominal:      General: Bowel sounds are normal.      Palpations: Abdomen is soft.   Musculoskeletal: Normal range of motion.   Skin:     General: Skin is warm and dry.   Neurological:      Mental Status: She is alert and oriented to person, place, and time.   Psychiatric:         Mood and Affect: Mood normal.         Behavior: Behavior normal.         Procedures         Lab Results (last 24 hours)     Procedure Component Value Units Date/Time    CBC & Differential [[id number]]  (Abnormal) Collected: 11/21/20 2117    Specimen: Blood Updated: 11/21/20 2139    Narrative:      The following orders were created for panel order CBC & Differential.  Procedure                               Abnormality         Status                     ---------                               -----------         ------                     CBC Auto Differential[[id number]]        Abnormal            Final result                 Please view results for these tests on the individual orders.    Comprehensive Metabolic Panel [[id number]]  (Abnormal) Collected: 11/21/20 2117    Specimen: Blood Updated: 11/21/20 2156     Glucose 139 mg/dL      BUN 21 mg/dL      Creatinine 1.53 mg/dL      Sodium 127 mmol/L      Potassium 4.2 mmol/L      Chloride 91 mmol/L      CO2 27.0 mmol/L      Calcium 9.3 mg/dL       Total Protein 6.9 g/dL      Albumin 4.10 g/dL      ALT (SGPT) 14 U/L      AST (SGOT) 22 U/L      Alkaline Phosphatase 42 U/L      Total Bilirubin 0.5 mg/dL      eGFR Non African Amer 34 mL/min/1.73      Globulin 2.8 gm/dL      A/G Ratio 1.5 g/dL      BUN/Creatinine Ratio 13.7     Anion Gap 9.0 mmol/L     Narrative:      GFR Normal >60  Chronic Kidney Disease <60  Kidney Failure <15      Ethanol [[id number]] Collected: 11/21/20 2117    Specimen: Blood Updated: 11/21/20 2151     Ethanol % <0.010 %     Narrative:      Not for legal purposes. Chain of Custody not followed.     CBC Auto Differential [[id number]]  (Abnormal) Collected: 11/21/20 2117    Specimen: Blood Updated: 11/21/20 2139     WBC 13.79 10*3/mm3      RBC 3.92 10*6/mm3      Hemoglobin 11.1 g/dL      Hematocrit 32.6 %      MCV 83.2 fL      MCH 28.3 pg      MCHC 34.0 g/dL      RDW 13.6 %      RDW-SD 40.8 fl      MPV 10.0 fL      Platelets 299 10*3/mm3      Neutrophil % 83.5 %      Lymphocyte % 8.1 %      Monocyte % 7.1 %      Eosinophil % 0.6 %      Basophil % 0.4 %      Immature Grans % 0.3 %      Neutrophils, Absolute 11.52 10*3/mm3      Lymphocytes, Absolute 1.12 10*3/mm3      Monocytes, Absolute 0.98 10*3/mm3      Eosinophils, Absolute 0.08 10*3/mm3      Basophils, Absolute 0.05 10*3/mm3      Immature Grans, Absolute 0.04 10*3/mm3      nRBC 0.0 /100 WBC     Urinalysis With Culture If Indicated - Urine, Catheter [[id number]]  (Abnormal) Collected: 11/21/20 2133    Specimen: Urine, Catheter Updated: 11/21/20 2155     Color, UA Yellow     Appearance, UA Clear     pH, UA 6.0     Specific Gravity, UA 1.010     Glucose, UA Negative     Ketones, UA Negative     Bilirubin, UA Negative     Blood, UA Negative     Protein, UA Negative     Leuk Esterase, UA Large (3+)     Nitrite, UA Negative     Urobilinogen, UA 0.2 E.U./dL    Urine Drug Screen - Urine, Catheter [[id number]]  (Abnormal) Collected: 11/21/20 2133    Specimen: Urine, Catheter Updated: 11/21/20 2150      THC, Screen, Urine Negative     Phencyclidine (PCP), Urine Negative     Cocaine Screen, Urine Negative     Methamphetamine, Ur Negative     Opiate Screen Negative     Amphetamine Screen, Urine Negative     Benzodiazepine Screen, Urine Positive     Tricyclic Antidepressants Screen Positive     Methadone Screen, Urine Negative     Barbiturates Screen, Urine Negative     Oxycodone Screen, Urine Positive     Propoxyphene Screen Negative     Buprenorphine, Screen, Urine Negative    Narrative:      Cutoff For Drugs Screened:    Amphetamines               500 ng/ml  Barbiturates               200 ng/ml  Benzodiazepines            150 ng/ml  Cocaine                    150 ng/ml  Methadone                  200 ng/ml  Opiates                    100 ng/ml  Phencyclidine               25 ng/ml  THC                            50 ng/ml  Methamphetamine            500 ng/ml  Tricyclic Antidepressants  300 ng/ml  Oxycodone                  100 ng/ml  Propoxyphene               300 ng/ml  Buprenorphine               10 ng/ml    The normal value for all drugs tested is negative. This report includes unconfirmed screening results, with the cutoff values listed, to be used for medical treatment purposes only.  Unconfirmed results must not be used for non-medical purposes such as employment or legal testing.  Clinical consideration should be applied to any drug of abuse test, particularly when unconfirmed results are used.      Urinalysis, Microscopic Only - Urine, Catheter [[id number]]  (Abnormal) Collected: 11/21/20 2133    Specimen: Urine, Catheter Updated: 11/21/20 2155     RBC, UA 0-2 /HPF      WBC, UA 6-12 /HPF      Bacteria, UA Trace /HPF      Squamous Epithelial Cells, UA 3-6 /HPF      Renal Epithelial Cells, UA 3-6 /HPF      Yeast, UA Small/1+ Yeast /HPF      Hyaline Casts, UA 0-2 /LPF      Amorphous Crystals, UA Small/1+ /HPF      Mucus, UA Trace /HPF      Methodology Manual Light Microscopy    Urine Culture - Urine, Urine,  Catheter [[id number]] Collected: 11/21/20 2133    Specimen: Urine, Catheter Updated: 11/21/20 2155    COVID PRE-OP / PRE-PROCEDURE SCREENING ORDER (NO ISOLATION) - Swab, Nasal Cavity [[id number]]  (Normal) Collected: 11/21/20 2231    Specimen: Swab from Nasal Cavity Updated: 11/21/20 2320    Narrative:      The following orders were created for panel order COVID PRE-OP / PRE-PROCEDURE SCREENING ORDER (NO ISOLATION) - Swab, Nasal Cavity.  Procedure                               Abnormality         Status                     ---------                               -----------         ------                     COVID-19,[facility]...[[id number]]  Normal              Final result                 Please view results for these tests on the individual orders.    COVID-19,[facility] IN-HOUSE,Nasal Swab No Transport Media 3-4 HR TAT - Swab, Nasal Cavity [[id number]]  (Normal) Collected: 11/21/20 2231    Specimen: Swab from Nasal Cavity Updated: 11/21/20 2320     COVID19 Not Detected    Narrative:      Fact sheet for providers: https://www.fda.gov/media/165724/download     Fact sheet for patients: https://www.fda.gov/media/835347/download          XR Chest 1 View   Final Result   1. No radiographic evidence of acute cardiopulmonary process.           This report was finalized on 11/21/2020 21:25 by Dr. Toro Thrasher MD.      CT Head Without Contrast    (Results Pending)       ED Course  ED Course as of Nov 21 2340   Sat Nov 21, 2020   2304 Patient has been more alert since he has been here than what was described by the paramedics when they picked her up.  However she still lethargic.  When she rest her to squat her pulse ox drops into the high 80s.  She did want to try to go home if she could once we tried to ambulate her but she could not support herself at all.  I think she will require further period of observation for stabilization.    [TR]      ED Course User Index  [TR] Fred Hedrick Jr., MD          MDM  Number of  Diagnoses or Management Options  Accidental drug overdose, initial encounter: new and requires workup     Amount and/or Complexity of Data Reviewed  Clinical lab tests: ordered and reviewed  Tests in the radiology section of CPT®: ordered and reviewed  Discuss the patient with other providers: yes    Risk of Complications, Morbidity, and/or Mortality  Presenting problems: moderate  Diagnostic procedures: moderate  Management options: moderate    Patient Progress  Patient progress: stable      Final diagnoses:   Accidental drug overdose, initial encounter          Fred Hedrick Jr., MD  11/21/20 1736

## 2020-11-23 LAB
BACTERIA SPEC AEROBE CULT: NO GROWTH
QT INTERVAL: 336 MS
QTC INTERVAL: 415 MS

## 2020-12-10 ENCOUNTER — TRANSCRIBE ORDERS (OUTPATIENT)
Dept: ADMINISTRATIVE | Facility: HOSPITAL | Age: 70
End: 2020-12-10

## 2020-12-10 DIAGNOSIS — Z12.31 ENCOUNTER FOR SCREENING MAMMOGRAM FOR MALIGNANT NEOPLASM OF BREAST: Primary | ICD-10-CM

## 2021-01-14 ENCOUNTER — TRANSCRIBE ORDERS (OUTPATIENT)
Dept: LAB | Facility: HOSPITAL | Age: 71
End: 2021-01-14

## 2021-01-14 ENCOUNTER — LAB (OUTPATIENT)
Dept: LAB | Facility: HOSPITAL | Age: 71
End: 2021-01-14

## 2021-01-14 DIAGNOSIS — R05.9 COUGH: ICD-10-CM

## 2021-01-14 DIAGNOSIS — J01.90 ACUTE SINUSITIS, RECURRENCE NOT SPECIFIED, UNSPECIFIED LOCATION: Primary | ICD-10-CM

## 2021-01-14 DIAGNOSIS — R11.0 NAUSEA: ICD-10-CM

## 2021-01-14 DIAGNOSIS — R51.9 NONINTRACTABLE HEADACHE, UNSPECIFIED CHRONICITY PATTERN, UNSPECIFIED HEADACHE TYPE: ICD-10-CM

## 2021-01-14 DIAGNOSIS — R50.9 FEVER, UNSPECIFIED: ICD-10-CM

## 2021-01-14 DIAGNOSIS — R06.02 SHORTNESS OF BREATH: ICD-10-CM

## 2021-01-14 DIAGNOSIS — M79.10 MYALGIA, UNSPECIFIED SITE: ICD-10-CM

## 2021-01-14 LAB — SARS-COV-2 ORF1AB RESP QL NAA+PROBE: NOT DETECTED

## 2021-01-14 PROCEDURE — U0004 COV-19 TEST NON-CDC HGH THRU: HCPCS | Performed by: PHYSICIAN ASSISTANT

## 2021-01-14 PROCEDURE — C9803 HOPD COVID-19 SPEC COLLECT: HCPCS | Performed by: PHYSICIAN ASSISTANT

## 2021-01-19 ENCOUNTER — TRANSCRIBE ORDERS (OUTPATIENT)
Dept: ADMINISTRATIVE | Facility: HOSPITAL | Age: 71
End: 2021-01-19

## 2021-01-19 DIAGNOSIS — R11.0 NAUSEA: ICD-10-CM

## 2021-01-19 DIAGNOSIS — R51.9 CHRONIC NONINTRACTABLE HEADACHE, UNSPECIFIED HEADACHE TYPE: ICD-10-CM

## 2021-01-19 DIAGNOSIS — R06.02 SHORTNESS OF BREATH: ICD-10-CM

## 2021-01-19 DIAGNOSIS — R05.9 COUGH: Primary | ICD-10-CM

## 2021-01-19 DIAGNOSIS — G89.29 CHRONIC NONINTRACTABLE HEADACHE, UNSPECIFIED HEADACHE TYPE: ICD-10-CM

## 2021-01-19 DIAGNOSIS — R50.9 HYPERTHERMIA-INDUCED DEFECT: ICD-10-CM

## 2021-01-19 DIAGNOSIS — R50.9 FEVER, UNKNOWN ORIGIN: ICD-10-CM

## 2021-02-04 ENCOUNTER — TRANSCRIBE ORDERS (OUTPATIENT)
Dept: ADMINISTRATIVE | Facility: HOSPITAL | Age: 71
End: 2021-02-04

## 2021-02-04 DIAGNOSIS — R05.9 COUGH: Primary | ICD-10-CM

## 2021-02-08 ENCOUNTER — HOSPITAL ENCOUNTER (OUTPATIENT)
Dept: GENERAL RADIOLOGY | Facility: HOSPITAL | Age: 71
Discharge: HOME | End: 2021-02-08
Admitting: FAMILY MEDICINE

## 2021-02-08 DIAGNOSIS — R05.9 COUGH: ICD-10-CM

## 2021-02-08 PROCEDURE — 71046 X-RAY EXAM CHEST 2 VIEWS: CPT

## 2021-02-10 ENCOUNTER — TRANSCRIBE ORDERS (OUTPATIENT)
Dept: ADMINISTRATIVE | Facility: HOSPITAL | Age: 71
End: 2021-02-10

## 2021-02-10 DIAGNOSIS — J18.9 PNEUMONIA DUE TO INFECTIOUS ORGANISM, UNSPECIFIED LATERALITY, UNSPECIFIED PART OF LUNG: Primary | ICD-10-CM

## 2021-02-10 DIAGNOSIS — R91.8 OTHER NONSPECIFIC ABNORMAL FINDING OF LUNG FIELD: ICD-10-CM

## 2021-02-15 ENCOUNTER — APPOINTMENT (OUTPATIENT)
Dept: CT IMAGING | Facility: HOSPITAL | Age: 71
End: 2021-02-15

## 2021-03-03 ENCOUNTER — APPOINTMENT (OUTPATIENT)
Dept: CT IMAGING | Facility: HOSPITAL | Age: 71
End: 2021-03-03

## 2021-03-16 ENCOUNTER — APPOINTMENT (OUTPATIENT)
Dept: CT IMAGING | Facility: HOSPITAL | Age: 71
End: 2021-03-16

## 2021-03-23 ENCOUNTER — HOSPITAL ENCOUNTER (OUTPATIENT)
Dept: CT IMAGING | Facility: HOSPITAL | Age: 71
Discharge: HOME | End: 2021-03-23
Admitting: FAMILY MEDICINE

## 2021-03-23 DIAGNOSIS — R91.8 OTHER NONSPECIFIC ABNORMAL FINDING OF LUNG FIELD: ICD-10-CM

## 2021-03-23 DIAGNOSIS — J18.9 PNEUMONIA DUE TO INFECTIOUS ORGANISM, UNSPECIFIED LATERALITY, UNSPECIFIED PART OF LUNG: ICD-10-CM

## 2021-03-23 PROCEDURE — 71250 CT THORAX DX C-: CPT

## 2021-03-30 ENCOUNTER — APPOINTMENT (OUTPATIENT)
Dept: CT IMAGING | Facility: HOSPITAL | Age: 71
End: 2021-03-30

## 2021-04-20 ENCOUNTER — TRANSCRIBE ORDERS (OUTPATIENT)
Dept: ADMINISTRATIVE | Facility: HOSPITAL | Age: 71
End: 2021-04-20

## 2021-04-20 DIAGNOSIS — Z78.0 POSTMENOPAUSAL STATUS: ICD-10-CM

## 2021-04-20 DIAGNOSIS — Z12.31 ENCOUNTER FOR SCREENING MAMMOGRAM FOR MALIGNANT NEOPLASM OF BREAST: Primary | ICD-10-CM

## 2021-06-07 ENCOUNTER — APPOINTMENT (OUTPATIENT)
Dept: BONE DENSITY | Facility: HOSPITAL | Age: 71
End: 2021-06-07

## 2021-06-07 ENCOUNTER — APPOINTMENT (OUTPATIENT)
Dept: MAMMOGRAPHY | Facility: HOSPITAL | Age: 71
End: 2021-06-07

## 2021-10-21 ENCOUNTER — TRANSCRIBE ORDERS (OUTPATIENT)
Dept: ADMINISTRATIVE | Facility: HOSPITAL | Age: 71
End: 2021-10-21

## 2021-10-21 DIAGNOSIS — R25.1 TREMOR, UNSPECIFIED: ICD-10-CM

## 2021-10-21 DIAGNOSIS — R27.0 ATAXIA, UNSPECIFIED: ICD-10-CM

## 2021-10-21 DIAGNOSIS — M25.562 LEFT KNEE PAIN, UNSPECIFIED CHRONICITY: ICD-10-CM

## 2021-10-21 DIAGNOSIS — R07.89 OTHER CHEST PAIN: ICD-10-CM

## 2021-10-21 DIAGNOSIS — R51.9 NONINTRACTABLE HEADACHE, UNSPECIFIED CHRONICITY PATTERN, UNSPECIFIED HEADACHE TYPE: ICD-10-CM

## 2021-10-21 DIAGNOSIS — M54.9 DORSALGIA: ICD-10-CM

## 2021-10-21 DIAGNOSIS — M54.2 CERVICALGIA: Primary | ICD-10-CM

## 2021-10-21 DIAGNOSIS — M25.552 LEFT HIP PAIN: ICD-10-CM

## 2021-10-26 ENCOUNTER — APPOINTMENT (OUTPATIENT)
Dept: CT IMAGING | Age: 71
End: 2021-10-26
Payer: MEDICARE

## 2021-10-26 ENCOUNTER — HOSPITAL ENCOUNTER (EMERGENCY)
Age: 71
Discharge: HOME | End: 2021-10-26
Attending: EMERGENCY MEDICINE
Payer: MEDICARE

## 2021-10-26 ENCOUNTER — HOSPITAL ENCOUNTER (EMERGENCY)
Facility: HOSPITAL | Age: 71
Discharge: LEFT BEFORE DISCHARGE | End: 2021-10-26

## 2021-10-26 ENCOUNTER — APPOINTMENT (OUTPATIENT)
Dept: GENERAL RADIOLOGY | Age: 71
End: 2021-10-26
Payer: MEDICARE

## 2021-10-26 VITALS
HEART RATE: 103 BPM | SYSTOLIC BLOOD PRESSURE: 182 MMHG | WEIGHT: 142 LBS | HEIGHT: 62 IN | RESPIRATION RATE: 17 BRPM | BODY MASS INDEX: 26.13 KG/M2 | DIASTOLIC BLOOD PRESSURE: 87 MMHG | OXYGEN SATURATION: 97 % | TEMPERATURE: 98.3 F

## 2021-10-26 VITALS
DIASTOLIC BLOOD PRESSURE: 149 MMHG | TEMPERATURE: 97.3 F | BODY MASS INDEX: 29.26 KG/M2 | RESPIRATION RATE: 18 BRPM | OXYGEN SATURATION: 91 % | SYSTOLIC BLOOD PRESSURE: 165 MMHG | WEIGHT: 160 LBS | HEART RATE: 107 BPM

## 2021-10-26 DIAGNOSIS — N28.1 RENAL CYST: ICD-10-CM

## 2021-10-26 DIAGNOSIS — R07.81 RIB PAIN: ICD-10-CM

## 2021-10-26 DIAGNOSIS — R10.13 EPIGASTRIC PAIN: ICD-10-CM

## 2021-10-26 DIAGNOSIS — M25.572 BILATERAL ANKLE PAIN, UNSPECIFIED CHRONICITY: ICD-10-CM

## 2021-10-26 DIAGNOSIS — N83.209 CYST OF OVARY, UNSPECIFIED LATERALITY: ICD-10-CM

## 2021-10-26 DIAGNOSIS — R51.9 NONINTRACTABLE HEADACHE, UNSPECIFIED CHRONICITY PATTERN, UNSPECIFIED HEADACHE TYPE: ICD-10-CM

## 2021-10-26 DIAGNOSIS — M25.562 PAIN IN BOTH KNEES, UNSPECIFIED CHRONICITY: ICD-10-CM

## 2021-10-26 DIAGNOSIS — R06.02 SHORTNESS OF BREATH: ICD-10-CM

## 2021-10-26 DIAGNOSIS — M25.571 BILATERAL ANKLE PAIN, UNSPECIFIED CHRONICITY: ICD-10-CM

## 2021-10-26 DIAGNOSIS — I10 HYPERTENSION, UNSPECIFIED TYPE: Primary | ICD-10-CM

## 2021-10-26 DIAGNOSIS — M54.9 BACK PAIN, UNSPECIFIED BACK LOCATION, UNSPECIFIED BACK PAIN LATERALITY, UNSPECIFIED CHRONICITY: ICD-10-CM

## 2021-10-26 DIAGNOSIS — M25.561 PAIN IN BOTH KNEES, UNSPECIFIED CHRONICITY: ICD-10-CM

## 2021-10-26 DIAGNOSIS — R10.9 ABDOMINAL PAIN, UNSPECIFIED ABDOMINAL LOCATION: ICD-10-CM

## 2021-10-26 DIAGNOSIS — R63.4 WEIGHT LOSS: ICD-10-CM

## 2021-10-26 DIAGNOSIS — M54.2 NECK PAIN: ICD-10-CM

## 2021-10-26 LAB
ALBUMIN SERPL-MCNC: 5.4 G/DL (ref 3.5–5.2)
ALP BLD-CCNC: 75 U/L (ref 35–104)
ALT SERPL-CCNC: 25 U/L (ref 5–33)
ANION GAP SERPL CALCULATED.3IONS-SCNC: 18 MMOL/L (ref 7–19)
AST SERPL-CCNC: 22 U/L (ref 5–32)
BACTERIA: ABNORMAL /HPF
BASOPHILS ABSOLUTE: 0.1 K/UL (ref 0–0.2)
BASOPHILS RELATIVE PERCENT: 0.5 % (ref 0–1)
BILIRUB SERPL-MCNC: 0.5 MG/DL (ref 0.2–1.2)
BILIRUBIN URINE: NEGATIVE
BLOOD, URINE: NEGATIVE
BUN BLDV-MCNC: 9 MG/DL (ref 8–23)
CALCIUM SERPL-MCNC: 10.8 MG/DL (ref 8.8–10.2)
CHLORIDE BLD-SCNC: 94 MMOL/L (ref 98–111)
CLARITY: CLEAR
CO2: 24 MMOL/L (ref 22–29)
COLOR: YELLOW
CREAT SERPL-MCNC: 0.8 MG/DL (ref 0.5–0.9)
EKG P AXIS: 44 DEGREES
EKG P-R INTERVAL: 120 MS
EKG Q-T INTERVAL: 324 MS
EKG QRS DURATION: 70 MS
EKG QTC CALCULATION (BAZETT): 408 MS
EKG T AXIS: 49 DEGREES
EOSINOPHILS ABSOLUTE: 0 K/UL (ref 0–0.6)
EOSINOPHILS RELATIVE PERCENT: 0 % (ref 0–5)
EPITHELIAL CELLS, UA: ABNORMAL /HPF
GFR AFRICAN AMERICAN: >59
GFR NON-AFRICAN AMERICAN: >60
GLUCOSE BLD-MCNC: 135 MG/DL (ref 74–109)
GLUCOSE URINE: NEGATIVE MG/DL
HCT VFR BLD CALC: 46.2 % (ref 37–47)
HEMOGLOBIN: 14.9 G/DL (ref 12–16)
HYALINE CASTS: ABNORMAL /LPF (ref 0–5)
IMMATURE GRANULOCYTES #: 0 K/UL
INR BLD: 0.93 (ref 0.88–1.18)
KETONES, URINE: ABNORMAL MG/DL
LEUKOCYTE ESTERASE, URINE: ABNORMAL
LIPASE: 14 U/L (ref 13–60)
LYMPHOCYTES ABSOLUTE: 1.2 K/UL (ref 1.1–4.5)
LYMPHOCYTES RELATIVE PERCENT: 11.8 % (ref 20–40)
MCH RBC QN AUTO: 27.6 PG (ref 27–31)
MCHC RBC AUTO-ENTMCNC: 32.3 G/DL (ref 33–37)
MCV RBC AUTO: 85.7 FL (ref 81–99)
MONOCYTES ABSOLUTE: 0.6 K/UL (ref 0–0.9)
MONOCYTES RELATIVE PERCENT: 6 % (ref 0–10)
NEUTROPHILS ABSOLUTE: 8 K/UL (ref 1.5–7.5)
NEUTROPHILS RELATIVE PERCENT: 81.4 % (ref 50–65)
NITRITE, URINE: NEGATIVE
PDW BLD-RTO: 14.2 % (ref 11.5–14.5)
PH UA: 5.5 (ref 5–8)
PLATELET # BLD: 407 K/UL (ref 130–400)
PMV BLD AUTO: 10.3 FL (ref 9.4–12.3)
POTASSIUM SERPL-SCNC: 4.1 MMOL/L (ref 3.5–5)
PRO-BNP: 154 PG/ML (ref 0–900)
PROTEIN UA: 100 MG/DL
PROTHROMBIN TIME: 12.7 SEC (ref 12–14.6)
RBC # BLD: 5.39 M/UL (ref 4.2–5.4)
RBC UA: ABNORMAL /HPF (ref 0–2)
SARS-COV-2, NAAT: NOT DETECTED
SODIUM BLD-SCNC: 136 MMOL/L (ref 136–145)
SPECIFIC GRAVITY UA: 1.01 (ref 1–1.03)
TOTAL PROTEIN: 9.2 G/DL (ref 6.6–8.7)
TROPONIN: <0.01 NG/ML (ref 0–0.03)
UROBILINOGEN, URINE: 0.2 E.U./DL
WBC # BLD: 9.8 K/UL (ref 4.8–10.8)
WBC UA: ABNORMAL /HPF (ref 0–5)

## 2021-10-26 PROCEDURE — 93005 ELECTROCARDIOGRAM TRACING: CPT | Performed by: EMERGENCY MEDICINE

## 2021-10-26 PROCEDURE — 81001 URINALYSIS AUTO W/SCOPE: CPT

## 2021-10-26 PROCEDURE — 74177 CT ABD & PELVIS W/CONTRAST: CPT

## 2021-10-26 PROCEDURE — 83880 ASSAY OF NATRIURETIC PEPTIDE: CPT

## 2021-10-26 PROCEDURE — 99282 EMERGENCY DEPT VISIT SF MDM: CPT

## 2021-10-26 PROCEDURE — 36415 COLL VENOUS BLD VENIPUNCTURE: CPT

## 2021-10-26 PROCEDURE — 72128 CT CHEST SPINE W/O DYE: CPT

## 2021-10-26 PROCEDURE — 93010 ELECTROCARDIOGRAM REPORT: CPT | Performed by: INTERNAL MEDICINE

## 2021-10-26 PROCEDURE — 70450 CT HEAD/BRAIN W/O DYE: CPT

## 2021-10-26 PROCEDURE — 99283 EMERGENCY DEPT VISIT LOW MDM: CPT

## 2021-10-26 PROCEDURE — 73560 X-RAY EXAM OF KNEE 1 OR 2: CPT

## 2021-10-26 PROCEDURE — 71260 CT THORAX DX C+: CPT

## 2021-10-26 PROCEDURE — 96374 THER/PROPH/DIAG INJ IV PUSH: CPT

## 2021-10-26 PROCEDURE — 99211 OFF/OP EST MAY X REQ PHY/QHP: CPT

## 2021-10-26 PROCEDURE — 87635 SARS-COV-2 COVID-19 AMP PRB: CPT

## 2021-10-26 PROCEDURE — 96375 TX/PRO/DX INJ NEW DRUG ADDON: CPT

## 2021-10-26 PROCEDURE — 84484 ASSAY OF TROPONIN QUANT: CPT

## 2021-10-26 PROCEDURE — 72125 CT NECK SPINE W/O DYE: CPT

## 2021-10-26 PROCEDURE — 80053 COMPREHEN METABOLIC PANEL: CPT

## 2021-10-26 PROCEDURE — 85610 PROTHROMBIN TIME: CPT

## 2021-10-26 PROCEDURE — 6360000004 HC RX CONTRAST MEDICATION: Performed by: EMERGENCY MEDICINE

## 2021-10-26 PROCEDURE — 85025 COMPLETE CBC W/AUTO DIFF WBC: CPT

## 2021-10-26 PROCEDURE — 6360000002 HC RX W HCPCS: Performed by: EMERGENCY MEDICINE

## 2021-10-26 PROCEDURE — 73610 X-RAY EXAM OF ANKLE: CPT

## 2021-10-26 PROCEDURE — 83690 ASSAY OF LIPASE: CPT

## 2021-10-26 PROCEDURE — 72131 CT LUMBAR SPINE W/O DYE: CPT

## 2021-10-26 RX ORDER — ONDANSETRON 2 MG/ML
4 INJECTION INTRAMUSCULAR; INTRAVENOUS ONCE
Status: COMPLETED | OUTPATIENT
Start: 2021-10-26 | End: 2021-10-26

## 2021-10-26 RX ORDER — MORPHINE SULFATE 4 MG/ML
4 INJECTION, SOLUTION INTRAMUSCULAR; INTRAVENOUS ONCE
Status: COMPLETED | OUTPATIENT
Start: 2021-10-26 | End: 2021-10-26

## 2021-10-26 RX ORDER — ONDANSETRON 4 MG/1
4 TABLET, ORALLY DISINTEGRATING ORAL EVERY 8 HOURS PRN
Qty: 30 TABLET | Refills: 0 | Status: SHIPPED | OUTPATIENT
Start: 2021-10-26

## 2021-10-26 RX ADMIN — MORPHINE SULFATE 4 MG: 4 INJECTION, SOLUTION INTRAMUSCULAR; INTRAVENOUS at 15:58

## 2021-10-26 RX ADMIN — IOPAMIDOL 90 ML: 755 INJECTION, SOLUTION INTRAVENOUS at 16:43

## 2021-10-26 RX ADMIN — ONDANSETRON 4 MG: 2 INJECTION INTRAMUSCULAR; INTRAVENOUS at 15:58

## 2021-10-26 ASSESSMENT — ENCOUNTER SYMPTOMS
SHORTNESS OF BREATH: 1
DIARRHEA: 0
ABDOMINAL PAIN: 1
SORE THROAT: 0
NAUSEA: 1
BACK PAIN: 1
VOMITING: 1
RHINORRHEA: 0

## 2021-10-26 ASSESSMENT — PAIN SCALES - GENERAL
PAINLEVEL_OUTOF10: 5
PAINLEVEL_OUTOF10: 8
PAINLEVEL_OUTOF10: 8

## 2021-10-26 NOTE — ED PROVIDER NOTES
[facility]  eMERGENCY dEPARTMENT eNCOUnter      Pt Name: Cezar Bañuelos  MRN: [id number]  [place] 1950  Date of evaluation: 10/26/2021  Provider: Papito Clark MD    CHIEF COMPLAINT       Chief Complaint   Patient presents with    Hypertension     reports erratic at home up to the 200's sbp          HISTORY OF PRESENT ILLNESS   (Location/Symptom, Timing/Onset,Context/Setting, Quality, Duration, Modifying Factors, Severity)  Note limiting factors. Cezar Bañuelos is a 73 y.o. female who presents to the emergency department with multiple concerns. The patient states that primarily she is here because her blood pressure has been elevated over the last few days. Its been up to 220/120. She has been taking her lisinopril at home. The patient also reports she is been having epigastric pain and vomiting. She has been having headaches and neck pain back pain and side pain since a fall 7 weeks ago. The patient also reports bilateral knee pain from the fall and difficulty walking. She reports gradually worsening shortness of breath. No definite fever. She reports she has lost 16 pounds over the last 3 to 4 months due to stomach pains and vomiting. HPI    NursingNotes were reviewed. REVIEW OF SYSTEMS    (2-9 systems for level 4, 10 or more for level 5)     Review of Systems   Constitutional: Positive for activity change, appetite change and fatigue. Negative for chills and fever. HENT: Negative for rhinorrhea and sore throat. Respiratory: Positive for shortness of breath. Cardiovascular: Negative for chest pain and leg swelling. Gastrointestinal: Positive for abdominal pain, nausea and vomiting. Negative for diarrhea. Genitourinary: Positive for flank pain. Negative for difficulty urinating. Musculoskeletal: Positive for arthralgias, back pain and neck pain. Skin: Negative for rash. Neurological: Positive for weakness and headaches. Psychiatric/Behavioral: Negative for confusion. A complete review of systems was performed and is negative except as noted above in the HPI. PAST MEDICAL HISTORY     Past Medical History:   Diagnosis Date    Anemia     Arthritis     Asthma     Hypertension          SURGICAL HISTORY       Past Surgical History:   Procedure Laterality Date    HC INJECT OTHER PERPHRL NERV Bilateral 8/18/2017    BILATERAL FLURO GUIDED TROCHANTERIC BURSA HIP INJECTIONS performed by Glendia Snellen, MD at [address]       Previous Medications    AZELASTINE (ASTELIN) 0.1 % NASAL SPRAY    1 spray by Nasal route 2 times daily Use in each nostril as directed    CHOLECALCIFEROL (VITAMIN D-3 PO)    Take by mouth    CLORAZEPATE DIPOTASSIUM (TRANXENE-T PO)    Take by mouth    CYCLOBENZAPRINE (FLEXERIL) 5 MG TABLET    Take 5 mg by mouth 3 times daily as needed for Muscle spasms    DULOXETINE HCL (CYMBALTA PO)    Take by mouth    ESTROGENS CONJUGATED (PREMARIN PO)    Take by mouth    EZETIMIBE (ZETIA PO)    Take by mouth    FENOFIBRATE PO    Take by mouth    GABAPENTIN (NEURONTIN) 100 MG CAPSULE    Take 100 mg by mouth 3 times daily    LISINOPRIL (PRINIVIL;ZESTRIL) 20 MG TABLET    Take 20 mg by mouth daily    MESALAMINE (ASACOL HD) 800 MG TBEC TBEC TABLET    Take 400 mg by mouth 3 times daily    MODAFINIL (PROVIGIL) 200 MG TABLET    Take 200 mg by mouth daily    MONTELUKAST (SINGULAIR) 10 MG TABLET    Take 10 mg by mouth nightly    OMEPRAZOLE (PRILOSEC) 20 MG DELAYED RELEASE CAPSULE    Take 40 mg by mouth daily    OXYCODONE-ACETAMINOPHEN (PERCOCET) 7.5-325 MG PER TABLET    Take 1 tablet by mouth every 4 hours as needed for Pain . PITAVASTATIN (LIVALO) 4 MG TABS TABLET    Take by mouth nightly    QUETIAPINE (SEROQUEL) 200 MG TABLET    Take 200 mg by mouth 2 times daily       ALLERGIES     Flagyl [metronidazole], Macrodantin [nitrofurantoin], Pcn [penicillins], and Sulfa antibiotics    FAMILY HISTORY     History reviewed.  No pertinent family history. SOCIAL HISTORY       Social History     Socioeconomic History    Marital status:      Spouse name: None    Number of children: None    Years of education: None    Highest education level: None   Occupational History    None   Tobacco Use    Smoking status: Never Smoker   Substance and Sexual Activity    Alcohol use: None    Drug use: None    Sexual activity: None   Other Topics Concern    None   Social History Narrative    None     Social Determinants of Health     Financial Resource Strain:     Difficulty of Paying Living Expenses:    Food Insecurity:     Worried About Running Out of Food in the Last Year:     [address] in the Last Year:    Transportation Needs:     Lack of Transportation (Medical):  Lack of Transportation (Non-Medical):    Physical Activity:     Days of Exercise per Week:     Minutes of Exercise per Session:    Stress:     Feeling of Stress :    Social Connections:     Frequency of Communication with Friends and Family:     Frequency of Social Gatherings with Friends and Family:     Attends [employer] Services:     Active Member of Clubs or Organizations:     Attends Club or Organization Meetings:     Marital Status:    Intimate Partner Violence:     Fear of Current or Ex-Partner:     Emotionally Abused:     Physically Abused:     Sexually Abused:        SCREENINGS             PHYSICAL EXAM    (up to 7 for level 4, 8 or more for level 5)     ED Triage Vitals   BP Temp Temp Source Pulse Resp SpO2 Height Weight   10/26/21 1257 10/26/21 1255 10/26/21 1255 10/26/21 1257 10/26/21 1257 10/26/21 1257 -- 10/26/21 1257   (!) 141/88 97.3 °F (36.3 °C) Temporal 108 18 91 %  160 lb (72.6 kg)       Physical Exam  Vitals and nursing note reviewed. Constitutional:       General: She is not in acute distress. Appearance: She is well-developed. She is not diaphoretic. HENT:      Head: Normocephalic and atraumatic.    Eyes:      Pupils: Pupils are equal, round, and reactive to light. Cardiovascular:      Rate and Rhythm: Normal rate and regular rhythm. Heart sounds: Normal heart sounds. Pulmonary:      Effort: Pulmonary effort is normal. No respiratory distress. Breath sounds: Normal breath sounds. Abdominal:      General: Bowel sounds are normal. There is no distension. Palpations: Abdomen is soft. Tenderness: There is abdominal tenderness. Comments: Epigastric region and left flank posteriorly   Musculoskeletal:         General: Tenderness present. Normal range of motion. Cervical back: Normal range of motion and neck supple. Comments: Tenderness upper thoracic and left rib. Skin:     General: Skin is warm and dry. Findings: No rash. Neurological:      Mental Status: She is alert and oriented to person, place, and time. Cranial Nerves: No cranial nerve deficit. Motor: No abnormal muscle tone. Coordination: Coordination normal.   Psychiatric:         Behavior: Behavior normal.         DIAGNOSTIC RESULTS     EKG: All EKG's are interpreted by the Emergency Department Physician who either signs or Co-signs this chart in the absence of a cardiologist.     sinus tachycardia rate 108 no acute ST wave abnormality    RADIOLOGY:   Non-plain film images such as CT, Ultrasound and MRI are read by the radiologist. [place] images are visualized and preliminarily interpreted by the emergency physician with the below findings:        Interpretation per the Radiologist below, if available at the time of this note:    CT ABDOMEN PELVIS W IV CONTRAST Additional Contrast? None   Final Result   1. No solid organ injury or hemoperitoneum. 2. Mild fatty liver. Prior cholecystectomy. Mild prominence of the   biliary tree likely related to reservoir effect. 3. There is a 1.2 cm cyst arising from the upper pole right kidney.    4. There are 2 cystic areas within the left ovary the largest of which measures 2.7 cm. GYN follow-up recommended in a patient of this age. Small cystic neoplasms are in the differential. However, no soft   tissue nodularity or septation is seen. 5. Prior hysterectomy. 6. 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 Degenerative changes of the spine. The full report of this exam was immediately signed and available to   the emergency room. The patient is currently in the emergency room. Signed by Dr Estephania Alanis   Final Result   1. No evidence of lung contusion or pneumothorax. Mild dependent   atelectasis. Minimal atelectasis or scarring in the right upper lobe   inferiorly adjacent to the minor fissure. 2. Minimal atheromatous disease of the thoracic aorta and coronary   arteries. No evidence of mediastinal hematoma/hemorrhage. 3. No acute bony abnormality is seen. The full report of this exam was immediately signed and available to   the emergency room. The patient is currently in the emergency room. Signed by Dr Sergio Heck      CT Lumbar Spine WO Contrast   Final Result   1. No lumbar spine fracture is seen. 2. Lumbar spine degenerative changes, as described. 3. 2 cystic areas within the left ovary the largest of which measures   2.6 cm. GYN follow-up recommended. The full report of this exam was immediately signed and available to   the emergency room. The patient is currently in the emergency room. Signed by Dr Arpit Noland   Final Result   No acute findings. The full report of this exam was immediately signed and available to   the emergency room. The patient is currently in the emergency room. Signed by Dr Sergio Heck      CT Cervical Spine WO Contrast   Final Result   1. No evidence of cervical spine fracture. 2. Degenerative changes of the cervical spine, as described.    The full report of this exam was immediately signed and available to   the emergency room. The patient is currently in the emergency room. Signed by Dr Maci Melendez   Final Result   1. There are no hemorrhage, edema or mass effect. There are no acute   findings. 2. Mild atrophy. The full report of this exam was immediately signed and available to   the emergency room. The patient is currently in the emergency room. Signed by Dr Gurpreet Cancino      XR KNEE RIGHT (1-2 VIEWS)   Final Result   As above. Signed by Dr Gurpreet Cancino      XR KNEE LEFT (1-2 VIEWS)   Final Result   As above. Signed by Dr Gurpreet Cancino      XR ANKLE RIGHT (MIN 3 VIEWS)   Final Result   As above. Signed by Dr Gurpreet Cancino      XR ANKLE LEFT (MIN 3 VIEWS)   Final Result   As above. Signed by Dr Gurpreet Cancino            ED BEDSIDE ULTRASOUND:   Performed by ED Physician - none    LABS:  Labs Reviewed   CBC WITH AUTO DIFFERENTIAL - Abnormal; Notable for the following components:       Result Value    MCHC 32.3 (*)     Platelets 623 (*)     Neutrophils % 81.4 (*)     Lymphocytes % 11.8 (*)     Neutrophils Absolute 8.0 (*)     All other components within normal limits   COMPREHENSIVE METABOLIC PANEL - Abnormal; Notable for the following components:    Chloride 94 (*)     Glucose 135 (*)     Calcium 10.8 (*)     Total Protein 9.2 (*)     Albumin 5.4 (*)     All other components within normal limits   URINE RT REFLEX TO CULTURE - Abnormal; Notable for the following components:    Ketones, Urine TRACE (*)     Protein,  (*)     Leukocyte Esterase, Urine TRACE (*)     All other components within normal limits   MICROSCOPIC URINALYSIS - Abnormal; Notable for the following components:    Bacteria, UA Rare (*)     All other components within normal limits   COVID-19, RAPID   BRAIN NATRIURETIC PEPTIDE   PROTIME-INR   TROPONIN   LIPASE       All other labs were within normal range or not returned as of this dictation.     EMERGENCY DEPARTMENT COURSE and DIFFERENTIALDIAGNOSIS/MDM:   Vitals:    Vitals:    10/26/21 1255 10/26/21 1257 10/26/21 1600   BP:  (!) 141/88 (!) 165/149   Pulse:  108 107   Resp:  18 18   Temp: 97.3 °F (36.3 °C)     TempSrc: Temporal     SpO2:  91%    Weight:  160 lb (72.6 kg)        MDM  Pt had a thorough work up today at her request. Her work up has been benign. Her blood pressure has been relatively controlled compared to her notes of 190s-220s. She needs to follow up with her primary care provider regarding her many concerns. CONSULTS:  None    PROCEDURES:  Unless otherwise notedbelow, none     Procedures    FINAL IMPRESSION     1. Hypertension, unspecified type    2. Shortness of breath    3. Rib pain    4. Back pain, unspecified back location, unspecified back pain laterality, unspecified chronicity    5. Neck pain    6. Epigastric pain    7. Pain in both knees, unspecified chronicity    8. Bilateral ankle pain, unspecified chronicity    9. Abdominal pain, unspecified abdominal location    10. Nonintractable headache, unspecified chronicity pattern, unspecified headache type    11. Cyst of ovary, unspecified laterality    12. Renal cyst    13.  Weight loss          DISPOSITION/PLAN   DISPOSITION        PATIENT REFERRED TO:  @FUP@    DISCHARGE MEDICATIONS:  New Prescriptions    ONDANSETRON (ZOFRAN ODT) 4 MG DISINTEGRATING TABLET    Take 1 tablet by mouth every 8 hours as needed for Nausea or Vomiting          (Please note that portions of this note were completed with a voice recognition program.  Efforts were made to edit the dictations butoccasionally words are mis-transcribed.)    Fabiola Flannery MD (electronically signed)  AttendingEmergency Physician         Fabiola Flannery MD  10/26/21 [id number]

## 2021-10-28 ENCOUNTER — TRANSCRIBE ORDERS (OUTPATIENT)
Dept: ADMINISTRATIVE | Facility: HOSPITAL | Age: 71
End: 2021-10-28

## 2021-10-28 DIAGNOSIS — N83.202 UNSPECIFIED OVARIAN CYST, LEFT SIDE: ICD-10-CM

## 2021-10-28 DIAGNOSIS — R51.9 NONINTRACTABLE HEADACHE, UNSPECIFIED CHRONICITY PATTERN, UNSPECIFIED HEADACHE TYPE: Primary | ICD-10-CM

## 2021-10-28 DIAGNOSIS — R61 GENERALIZED HYPERHIDROSIS: Primary | ICD-10-CM

## 2021-10-28 DIAGNOSIS — R00.2 PALPITATIONS: ICD-10-CM

## 2021-10-28 LAB
QT INTERVAL: 354 MS
QTC INTERVAL: 456 MS

## 2021-11-01 ENCOUNTER — LAB (OUTPATIENT)
Dept: LAB | Facility: HOSPITAL | Age: 71
End: 2021-11-01

## 2021-11-01 DIAGNOSIS — N83.202 UNSPECIFIED OVARIAN CYST, LEFT SIDE: ICD-10-CM

## 2021-11-01 DIAGNOSIS — R61 GENERALIZED HYPERHIDROSIS: ICD-10-CM

## 2021-11-01 PROCEDURE — 83497 ASSAY OF 5-HIAA: CPT

## 2021-11-01 PROCEDURE — 81050 URINALYSIS VOLUME MEASURE: CPT

## 2021-11-02 ENCOUNTER — HOSPITAL ENCOUNTER (OUTPATIENT)
Dept: MRI IMAGING | Facility: HOSPITAL | Age: 71
Discharge: HOME | End: 2021-11-02
Admitting: FAMILY MEDICINE

## 2021-11-02 ENCOUNTER — TRANSCRIBE ORDERS (OUTPATIENT)
Dept: ADMINISTRATIVE | Facility: HOSPITAL | Age: 71
End: 2021-11-02

## 2021-11-02 DIAGNOSIS — M81.0 AGE-RELATED OSTEOPOROSIS WITHOUT CURRENT PATHOLOGICAL FRACTURE: ICD-10-CM

## 2021-11-02 DIAGNOSIS — M85.80 OTHER SPECIFIED DISORDERS OF BONE DENSITY AND STRUCTURE, UNSPECIFIED SITE: ICD-10-CM

## 2021-11-02 DIAGNOSIS — R25.1 TREMOR, UNSPECIFIED: ICD-10-CM

## 2021-11-02 DIAGNOSIS — R51.9 NONINTRACTABLE HEADACHE, UNSPECIFIED CHRONICITY PATTERN, UNSPECIFIED HEADACHE TYPE: ICD-10-CM

## 2021-11-02 DIAGNOSIS — R27.0 ATAXIA, UNSPECIFIED: ICD-10-CM

## 2021-11-02 DIAGNOSIS — Z13.820 SPECIAL SCREENING FOR OSTEOPOROSIS: ICD-10-CM

## 2021-11-02 DIAGNOSIS — Z12.31 ENCOUNTER FOR SCREENING MAMMOGRAM FOR MALIGNANT NEOPLASM OF BREAST: Primary | ICD-10-CM

## 2021-11-02 PROCEDURE — 0 GADOBENATE DIMEGLUMINE 529 MG/ML SOLUTION: Performed by: FAMILY MEDICINE

## 2021-11-02 PROCEDURE — A9577 INJ MULTIHANCE: HCPCS | Performed by: FAMILY MEDICINE

## 2021-11-02 PROCEDURE — 70553 MRI BRAIN STEM W/O & W/DYE: CPT

## 2021-11-02 RX ADMIN — GADOBENATE DIMEGLUMINE 10 ML: 529 INJECTION, SOLUTION INTRAVENOUS at 15:45

## 2021-11-05 LAB
5OH-INDOLEACETATE 24H UR-MCNC: 1 MG/L
5OH-INDOLEACETATE 24H UR-MRATE: 3.2 MG/24 HR (ref 0–14.9)

## 2021-11-08 ENCOUNTER — HOSPITAL ENCOUNTER (OUTPATIENT)
Dept: CARDIOLOGY | Facility: HOSPITAL | Age: 71
Discharge: HOME | End: 2021-11-08
Admitting: FAMILY MEDICINE

## 2021-11-08 DIAGNOSIS — R51.9 NONINTRACTABLE HEADACHE, UNSPECIFIED CHRONICITY PATTERN, UNSPECIFIED HEADACHE TYPE: ICD-10-CM

## 2021-11-08 DIAGNOSIS — R00.2 PALPITATIONS: ICD-10-CM

## 2021-11-08 PROCEDURE — 93225 XTRNL ECG REC<48 HRS REC: CPT

## 2021-11-15 LAB
MAXIMAL PREDICTED HEART RATE: 151 BPM
STRESS TARGET HR: 128 BPM

## 2021-11-15 PROCEDURE — 93227 XTRNL ECG REC<48 HR R&I: CPT | Performed by: EMERGENCY MEDICINE

## 2021-12-14 ENCOUNTER — APPOINTMENT (OUTPATIENT)
Dept: BONE DENSITY | Facility: HOSPITAL | Age: 71
End: 2021-12-14

## 2021-12-14 ENCOUNTER — APPOINTMENT (OUTPATIENT)
Dept: MAMMOGRAPHY | Facility: HOSPITAL | Age: 71
End: 2021-12-14

## 2022-01-10 ENCOUNTER — OFFICE VISIT (OUTPATIENT)
Dept: NEUROSURGERY | Age: 72
End: 2022-01-10
Payer: MEDICARE

## 2022-01-10 VITALS
HEART RATE: 101 BPM | TEMPERATURE: 97.6 F | OXYGEN SATURATION: 96 % | BODY MASS INDEX: 29.44 KG/M2 | SYSTOLIC BLOOD PRESSURE: 114 MMHG | DIASTOLIC BLOOD PRESSURE: 71 MMHG | WEIGHT: 160 LBS | HEIGHT: 62 IN
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
## 2022-01-10 DIAGNOSIS — R20.0 NUMBNESS AND TINGLING: ICD-10-CM

## 2022-01-10 DIAGNOSIS — R20.2 NUMBNESS AND TINGLING: ICD-10-CM

## 2022-01-10 DIAGNOSIS — R20.0 NUMBNESS AND TINGLING: Primary | ICD-10-CM

## 2022-01-10 DIAGNOSIS — R20.2 NUMBNESS AND TINGLING: Primary | ICD-10-CM

## 2022-01-10 LAB
ALBUMIN SERPL-MCNC: 4.3 G/DL (ref 3.5–5.2)
ALP BLD-CCNC: 54 U/L (ref 35–104)
ALT SERPL-CCNC: 15 U/L (ref 5–33)
ANION GAP SERPL CALCULATED.3IONS-SCNC: 12 MMOL/L (ref 7–19)
AST SERPL-CCNC: 20 U/L (ref 5–32)
BASOPHILS ABSOLUTE: 0.1 K/UL (ref 0–0.2)
BASOPHILS RELATIVE PERCENT: 1 % (ref 0–1)
BILIRUB SERPL-MCNC: <0.2 MG/DL (ref 0.2–1.2)
BUN BLDV-MCNC: 17 MG/DL (ref 8–23)
C-REACTIVE PROTEIN: 0.61 MG/DL (ref 0–0.5)
CALCIUM SERPL-MCNC: 9.5 MG/DL (ref 8.8–10.2)
CHLORIDE BLD-SCNC: 95 MMOL/L (ref 98–111)
CO2: 28 MMOL/L (ref 22–29)
CREAT SERPL-MCNC: 1 MG/DL (ref 0.5–0.9)
EOSINOPHILS ABSOLUTE: 0.3 K/UL (ref 0–0.6)
EOSINOPHILS RELATIVE PERCENT: 4.2 % (ref 0–5)
GFR AFRICAN AMERICAN: >59
GFR NON-AFRICAN AMERICAN: 55
GLUCOSE BLD-MCNC: 86 MG/DL (ref 74–109)
HCT VFR BLD CALC: 38.8 % (ref 37–47)
HEMOGLOBIN: 12 G/DL (ref 12–16)
IMMATURE GRANULOCYTES #: 0 K/UL
LYMPHOCYTES ABSOLUTE: 2.3 K/UL (ref 1.1–4.5)
LYMPHOCYTES RELATIVE PERCENT: 32.3 % (ref 20–40)
MCH RBC QN AUTO: 27.3 PG (ref 27–31)
MCHC RBC AUTO-ENTMCNC: 30.9 G/DL (ref 33–37)
MCV RBC AUTO: 88.4 FL (ref 81–99)
MONOCYTES ABSOLUTE: 0.8 K/UL (ref 0–0.9)
MONOCYTES RELATIVE PERCENT: 10.8 % (ref 0–10)
NEUTROPHILS ABSOLUTE: 3.7 K/UL (ref 1.5–7.5)
NEUTROPHILS RELATIVE PERCENT: 51.4 % (ref 50–65)
PDW BLD-RTO: 13.6 % (ref 11.5–14.5)
PLATELET # BLD: 352 K/UL (ref 130–400)
PMV BLD AUTO: 10 FL (ref 9.4–12.3)
POTASSIUM SERPL-SCNC: 3.5 MMOL/L (ref 3.5–5)
RBC # BLD: 4.39 M/UL (ref 4.2–5.4)
RHEUMATOID FACTOR: <10 IU/ML
SEDIMENTATION RATE, ERYTHROCYTE: 8 MM/HR (ref 0–25)
SODIUM BLD-SCNC: 135 MMOL/L (ref 136–145)
TOTAL PROTEIN: 7.3 G/DL (ref 6.6–8.7)
TSH REFLEX FT4: 3.22 UIU/ML (ref 0.35–5.5)
VITAMIN B-12: >2000 PG/ML (ref 211–946)
WBC # BLD: 7.1 K/UL (ref 4.8–10.8)

## 2022-01-10 PROCEDURE — 99204 OFFICE O/P NEW MOD 45 MIN: CPT | Performed by: NURSE PRACTITIONER

## 2022-01-10 RX ORDER — OLMESARTAN MEDOXOMIL 40 MG/1
40 TABLET ORAL DAILY
COMMUNITY

## 2022-01-10 RX ORDER — PANTOPRAZOLE SODIUM 40 MG/1
40 TABLET, DELAYED RELEASE ORAL DAILY
COMMUNITY
End: 2022-08-17

## 2022-01-10 RX ORDER — TRAZODONE HYDROCHLORIDE 100 MG/1
100 TABLET ORAL NIGHTLY
COMMUNITY

## 2022-01-10 RX ORDER — TIZANIDINE 4 MG/1
4 TABLET ORAL EVERY 12 HOURS PRN
COMMUNITY
End: 2022-08-17

## 2022-01-10 NOTE — PROGRESS NOTES
[facility] Neurology Office Note      Patient:   Page Cottrell  MR#:    [id number]  Account Number:                         YOB: 1950  Date of Evaluation:  1/10/2022  Time of Note:                          3:34 PM  Primary/Referring Physician:  Negro Moncada DO   Consulting Physician:  Rox Porter, BRYAN, APRN    NEW PATIENT CONSULTATION    Chief Complaint   Patient presents with    New Patient     c/o numbness all over that comes and goes    Numbness     HISTORY OF PRESENT ILLNESS    Page Cottrell is a 73y.o. year old female here for evaluation of tremors, numbness. She notes intermittent numbness. Can occur in her hands or feet. Typically occurs at night. Symptoms will last for 20-30 minutes and then resolves, no clear residual/returns to baseline. She notes intermittent tremors, bilateral upper extremities. No clear resting tremor. Worsens with intention/posture. Has noted that handwriting has messier. Notes intermittent gait changes, feels imbalanced. She notes knee pain and foot pain. Notes lower back pain with radiation into bilateral hips. Family history with mother having parkinsons. She had a fall several weeks ago, hit her head during the fall, no clear LOC. Has noted mild headaches since that time. No other complaints. Past Medical History:   Diagnosis Date    Anemia     Arthritis     Asthma     Hypertension        Past Surgical History:   Procedure Laterality Date    HC INJECT OTHER PERPHRL NERV Bilateral 8/18/2017    BILATERAL FLURO GUIDED TROCHANTERIC BURSA HIP INJECTIONS performed by Candace Cartagena MD at [facility]       History reviewed. No pertinent family history.     Social History     Socioeconomic History    Marital status:      Spouse name: Not on file    Number of children: Not on file    Years of education: Not on file    Highest education level: Not on file   Occupational History    Not on file   Tobacco Use    Smoking status: Former Smoker Quit date: 1/10/2006     Years since quittin.0    Smokeless tobacco: Never Used   Vaping Use    Vaping Use: Never used   Substance and Sexual Activity    Alcohol use: Not Currently    Drug use: Not Currently    Sexual activity: Not on file   Other Topics Concern    Not on file   Social History Narrative    Not on file     Social Determinants of Health     Financial Resource Strain:     Difficulty of Paying Living Expenses: Not on file   Food Insecurity:     Worried About Running Out of Food in the Last Year: Not on file    Nghia of Food in the Last Year: Not on file   Transportation Needs:     Lack of Transportation (Medical): Not on file    Lack of Transportation (Non-Medical):  Not on file   Physical Activity:     Days of Exercise per Week: Not on file    Minutes of Exercise per Session: Not on file   Stress:     Feeling of Stress : Not on file   Social Connections:     Frequency of Communication with Friends and Family: Not on file    Frequency of Social Gatherings with Friends and Family: Not on file    Attends Latter-day Services: Not on file    Active Member of [address] [place] [address] or Organizations: Not on file    Attends Club or Organization Meetings: Not on file    Marital Status: Not on file   Intimate Partner Violence:     Fear of Current or Ex-Partner: Not on file    Emotionally Abused: Not on file    Physically Abused: Not on file    Sexually Abused: Not on file   Housing Stability:     Unable to Pay for Housing in the Last Year: Not on file    Number of [place] in the Last Year: Not on file    Unstable Housing in the Last Year: Not on file       Current Outpatient Medications   Medication Sig Dispense Refill    tiZANidine (ZANAFLEX) 4 MG tablet Take 4 mg by mouth every 12 hours as needed      olmesartan (BENICAR) 40 MG tablet Take 40 mg by mouth daily      traZODone (DESYREL) 100 MG tablet Take 100 mg by mouth nightly      pantoprazole (PROTONIX) 40 MG tablet Take 40 mg by mouth daily      ondansetron (ZOFRAN ODT) 4 MG disintegrating tablet Take 1 tablet by mouth every 8 hours as needed for Nausea or Vomiting 30 tablet 0    mesalamine (ASACOL HD) 800 MG TBEC TBEC tablet Take 400 mg by mouth 3 times daily      DULoxetine (CYMBALTA) 60 MG extended release capsule Take 60 mg by mouth daily       FENOFIBRATE PO Take by mouth      cyclobenzaprine (FLEXERIL) 5 MG tablet Take 5 mg by mouth 3 times daily as needed for Muscle spasms      gabapentin (NEURONTIN) 300 MG capsule Take 300 mg by mouth daily.  omeprazole 20 MG EC tablet Take 40 mg by mouth daily       oxyCODONE-acetaminophen (PERCOCET) 7.5-325 MG per tablet Take 1 tablet by mouth every 4 hours as needed for Pain .  QUEtiapine (SEROQUEL) 200 MG tablet Take 200 mg by mouth 2 times daily      montelukast (SINGULAIR) 10 MG tablet Take 10 mg by mouth nightly      Clorazepate Dipotassium (TRANXENE-T PO) Take by mouth      Cholecalciferol (VITAMIN D-3 PO) Take by mouth       No current facility-administered medications for this visit. Allergies   Allergen Reactions    Flagyl [Metronidazole]     Macrodantin [Nitrofurantoin]     Pcn [Penicillins]     Sulfa Antibiotics      REVIEW OF SYSTEMS  Constitutional: []? Fever []? Sweats []? Chills []? Recent Injury [x]? Denies all unless marked  HEENT:[]? Headache  []? Head Injury []? Hearing Loss  []? Sore Throat  []? Ear Ache [x]? Denies all unless marked  Spine:  []? Neck pain  []? Back pain  []? Sciaticia  [x]? Denies all unless marked  Cardiovascular:[]? Heart Disease []? Palpitations []? Chest Pain   [x]? Denies all unless marked  Pulmonary: []? Shortness of Breath []? Cough   [x]? Denies all unless marked  Psychiatric/Behavioral:[]? Depression []? Anxiety [x]? Denies all unless marked  Gastrointestinal: []? Nausea  []? Vomiting  []? Abdominal Pain  []? Constipation  []? Diarrhea  [x]? Denies all unless marked  Genitourinary:   []? Frequency  []? Urgency  []? Dysuria []? Incontinence  [x]? Denies all unless marked  Extremities: []? Pain  []? Swelling  [x]? Denies all unless marked  Musculoskeletal: []? Myalgias  []? Joint Pain  []? Arthritis []? Muscle Cramps []? Muscle Twitches  [x]? Denies all unless marked  Sleep: []? Insomnia[]? Snoring []? Restless Legs  []? Sleep Apnea  []? Daytime Sleepiness  [x]? Denies all unless marked  Skin:[]? Rash []? Color Change [x]? Denies all unless marked   Neurological:[]? Visual Disturbance []? Memory Loss []? Loss of Balance []? Slurred Speech []? Weakness []? Seizures  []? Dizziness [x]? Denies all unless marked    The MA has completed the ROS with the patient. I have reviewed it in its' entirety with the patient and agree with the documentation. PHYSICAL EXAM  /71   Pulse 101   Temp 97.6 °F (36.4 °C)   Ht 5' 2\" (1.575 m)   Wt 160 lb (72.6 kg)   SpO2 96%   BMI 29.26 kg/m²       Constitutional - No acute distress    HEENT- Conjunctiva normal.  No scars, masses, or lesions over external nose or ears, no neck masses noted, no jugular vein distension, no bruit  Cardiac- Regular rate and rhythm  Pulmonary- Good expansion, normal effort without use of accessory muscles  Musculoskeletal - No significant wasting of muscles noted, no bony deformities  Extremities - No clubbing, cyanosis or edema  Skin - Warm, dry, and intact. No rash, erythema, or pallor  Psychiatric - Mood, affect, and behavior appear normal      NEUROLOGICAL EXAM     Mental status   [x] Awake, alert, oriented   [x]Affect attention and concentration appear appropriate  [x]Recent and remote memory appears unremarkable  [x]Speech normal without dysarthria or aphasia, comprehension and repetition intact.    COMMENTS:    Cranial Nerves [x]No VF deficit to confrontation,  no papilledema on fundoscopic exam.  [x]PERRLA, EOMI, no nystagmus, conjugate eye movements, no ptosis  [x]Face symmetric  [x]Facial sensation intact  [x]Tongue midline no atrophy or fasciculations present  [x]Palate midline, hearing to finger rub normal bilaterally  [x]Shoulder shrug and SCM testing normal bilaterally  COMMENTS:   Motor   [x]5/5 strength x 4 extremities  [x]Normal bulk and tone  [x]No tremor present  [x]No rigidity or bradykinesia noted  COMMENTS:   Sensory  [x]Sensation intact to light touch, pin prick, vibration, and proprioception BLE  []Sensation intact to light touch, pin prick, vibration, and proprioception BUE  COMMENTS: decreased PP left hand, distal    Coordination [x]FTN normal bilaterally   [x]HTS normal bilaterally  [x]ZOLTAN normal bilaterally. COMMENTS:    Reflexes  [x]Symmetric and non-pathological  [x]Toes down going bilaterally  [x]No clonus present  COMMENTS:   Gait                  []Normal steady gait    []Ataxic    []Spastic     []Magnetic     []Shuffling  COMMENTS: cautious        LABS RECORD AND IMAGING REVIEW (As below and per HPI)    No results found for: [id number]  Lab Results   Component Value Date    WBC 9.8 10/26/2021    HGB 14.9 10/26/2021    HCT 46.2 10/26/2021    MCV 85.7 10/26/2021     (H) 10/26/2021     Lab Results   Component Value Date     10/26/2021    K 4.1 10/26/2021    CL 94 (L) 10/26/2021    CO2 24 10/26/2021    BUN 9 10/26/2021    CREATININE 0.8 10/26/2021    GLUCOSE 135 (H) 10/26/2021    CALCIUM 10.8 (H) 10/26/2021    PROT 9.2 (H) 10/26/2021    LABALBU 5.4 (H) 10/26/2021    BILITOT 0.5 10/26/2021    ALKPHOS 75 10/26/2021    AST 22 10/26/2021    ALT 25 10/26/2021    LABGLOM >60 10/26/2021    GFRAA >59 10/26/2021     No results found for: CHOL, TRIG, HDL, LDLCALC  No results found for: TSH, T4FREE  No results found for: CRP, SEDRATE     XR KNEE LEFT (1-2 VIEWS)    Result Date: 10/26/2021  EXAMINATION:  XR KNEE LEFT (1-2 VIEWS), XR KNEE RIGHT (1-2 VIEWS), XR ANKLE RIGHT (MIN 3 VIEWS), XR ANKLE LEFT (MIN 3 VIEWS)  10/26/2021 4:42 PM HISTORY: Bilateral knee pain. Bilateral ankle pain. COMPARISON: No comparison study.  TECHNIQUE: 2 views were obtained of both knees. 3 views of both ankles. LEFT KNEE:  There is no fracture or joint effusion. There is mild narrowing of all 3 compartments. There is no significant spurring. RIGHT KNEE: There is no fracture or joint effusion. There is mild narrowing of all 3 compartments. There is no significant spurring. RIGHT ANKLE: The ankle joint space is maintained. There is no evidence of fracture. There may be very mild soft tissue edema involving the lower leg. LEFT ANKLE: The ankle joint space is well maintained. There is no evidence of ankle fracture. There is soft tissue swelling/edema of the lower leg and ankle. As above. Signed by Dr Trace Boyd    XR KNEE RIGHT (1-2 VIEWS)    Result Date: 10/26/2021  EXAMINATION:  XR KNEE LEFT (1-2 VIEWS), XR KNEE RIGHT (1-2 VIEWS), XR ANKLE RIGHT (MIN 3 VIEWS), XR ANKLE LEFT (MIN 3 VIEWS)  10/26/2021 4:42 PM HISTORY: Bilateral knee pain. Bilateral ankle pain. COMPARISON: No comparison study. TECHNIQUE: 2 views were obtained of both knees. 3 views of both ankles. LEFT KNEE:  There is no fracture or joint effusion. There is mild narrowing of all 3 compartments. There is no significant spurring. RIGHT KNEE: There is no fracture or joint effusion. There is mild narrowing of all 3 compartments. There is no significant spurring. RIGHT ANKLE: The ankle joint space is maintained. There is no evidence of fracture. There may be very mild soft tissue edema involving the lower leg. LEFT ANKLE: The ankle joint space is well maintained. There is no evidence of ankle fracture. There is soft tissue swelling/edema of the lower leg and ankle. As above. Signed by Dr Trace Boyd    XR ANKLE LEFT (MIN 3 VIEWS)    Result Date: 10/26/2021  EXAMINATION:  XR KNEE LEFT (1-2 VIEWS), XR KNEE RIGHT (1-2 VIEWS), XR ANKLE RIGHT (MIN 3 VIEWS), XR ANKLE LEFT (MIN 3 VIEWS)  10/26/2021 4:42 PM HISTORY: Bilateral knee pain. Bilateral ankle pain. COMPARISON: No comparison study.  TECHNIQUE: 2 views were obtained of both knees. 3 views of both ankles. LEFT KNEE:  There is no fracture or joint effusion. There is mild narrowing of all 3 compartments. There is no significant spurring. RIGHT KNEE: There is no fracture or joint effusion. There is mild narrowing of all 3 compartments. There is no significant spurring. RIGHT ANKLE: The ankle joint space is maintained. There is no evidence of fracture. There may be very mild soft tissue edema involving the lower leg. LEFT ANKLE: The ankle joint space is well maintained. There is no evidence of ankle fracture. There is soft tissue swelling/edema of the lower leg and ankle. As above. Signed by Dr Enrique Carmona    XR ANKLE RIGHT (MIN 3 VIEWS)    Result Date: 10/26/2021  EXAMINATION:  XR KNEE LEFT (1-2 VIEWS), XR KNEE RIGHT (1-2 VIEWS), XR ANKLE RIGHT (MIN 3 VIEWS), XR ANKLE LEFT (MIN 3 VIEWS)  10/26/2021 4:42 PM HISTORY: Bilateral knee pain. Bilateral ankle pain. COMPARISON: No comparison study. TECHNIQUE: 2 views were obtained of both knees. 3 views of both ankles. LEFT KNEE:  There is no fracture or joint effusion. There is mild narrowing of all 3 compartments. There is no significant spurring. RIGHT KNEE: There is no fracture or joint effusion. There is mild narrowing of all 3 compartments. There is no significant spurring. RIGHT ANKLE: The ankle joint space is maintained. There is no evidence of fracture. There may be very mild soft tissue edema involving the lower leg. LEFT ANKLE: The ankle joint space is well maintained. There is no evidence of ankle fracture. There is soft tissue swelling/edema of the lower leg and ankle. As above. Signed by Dr Enrique Carmona    CT HEAD WO CONTRAST    Result Date: 10/26/2021  EXAMINATION:  CT HEAD WO CONTRAST  10/26/2021 6:29 PM HISTORY: The patient fell 7 weeks ago. The patient has headache. TECHNIQUE: Multiple axial images were obtained through the brain without contrast infusion. Multiplanar images were reconstructed. DLP: 856 mGy-cm.  Automated exposure control was utilized. COMPARISON: No comparison study. FINDINGS: There are no hemorrhage, edema or mass effect. There is mild atrophy. The ventricular system is nondilated. No calvarial fracture is seen. The visualized paranasal sinuses are clear. The mastoid air cells are clear. 1. There are no hemorrhage, edema or mass effect. There are no acute findings. 2. Mild atrophy. The full report of this exam was immediately signed and available to the emergency room. The patient is currently in the emergency room. Signed by Dr Julianna Mortimer    Result Date: 10/26/2021  EXAMINATION:  CT CHEST W CONTRAST  10/26/2021 6:49 PM HISTORY: The patient fell 7 weeks ago and has rib pain. The patient did not have a chest x-ray or rib series today. COMPARISON: No comparison study. DLP: 0187 mGy-cm. Automated exposure control was utilized. TECHNIQUE: Spiral CT was performed of the chest with contrast. Multiplanar images were reconstructed. MEDIASTINUM/VASCULAR: There is minimal atheromatous disease of the thoracic aorta. There is no evidence of acute traumatic aortic injury. There is minimal coronary artery calcification. Heart size is mildly prominent. There are no pathologically enlarged mediastinal or hilar lymph nodes. LUNGS: There is minimal dependent atelectasis. There is minimal atelectasis or scarring in the right upper lobe inferiorly adjacent to the minor fissure image 66 of series 6, images 58 and 59 series 7 and image 131 series 8. UPPER ABDOMEN: Please see the abdomen and pelvis CT report separately. BONES AND SOFT TISSUES: There are degenerative changes of the thoracic spine. The sternum is intact. No displaced rib fracture is seen. The scapulae are intact. The clavicles are intact. 1. No evidence of lung contusion or pneumothorax. Mild dependent atelectasis. Minimal atelectasis or scarring in the right upper lobe inferiorly adjacent to the minor fissure.  2. Minimal atheromatous disease of the thoracic aorta and coronary arteries. No evidence of mediastinal hematoma/hemorrhage. 3. No acute bony abnormality is seen. The full report of this exam was immediately signed and available to the emergency room. The patient is currently in the emergency room. Signed by Dr Angie Victor    CT Cervical Spine WO Contrast    Result Date: 10/26/2021  EXAMINATION:  CT CERVICAL SPINE WO CONTRAST  10/26/2021 6:31 PM HISTORY: The patient fell 7 weeks ago. Headaches. No x-rays were obtained. TECHNIQUE: Spiral CT was performed of the cervical spine. Sagittal and coronal images were reconstructed. COMPARISON: No comparison study. DLP: 491 mGy-cm. Automated exposure control was utilized. FINDINGS: The cervical spine demonstrates normal alignment. No fractures identified. There is degenerative disc disease with uncinate spurring at C5-6 and C6-7. There is moderate right-sided foraminal narrowing at C6-7.    1. No evidence of cervical spine fracture. 2. Degenerative changes of the cervical spine, as described. The full report of this exam was immediately signed and available to the emergency room. The patient is currently in the emergency room. Signed by Dr Orin Al    Result Date: 10/26/2021  EXAMINATION:  CT THORACIC SPINE WO CONTRAST  10/26/2021 6:39 PM HISTORY: The patient fell 7 weeks ago. The patient did not have any thoracic spine x-rays today. The patient has back pain. COMPARISON: No comparison study. TECHNIQUE: Spiral CT was performed of the thoracic spine. Sagittal and coronal images were reconstructed. DLP: 2496 mGY-cm. Automated exposure control was utilized. FINDINGS: There is no evidence of thoracic spine fracture. There is mild disc narrowing and endplate spurring at multiple thoracic levels. There is no significant spinal or foraminal stenosis identified. No acute findings.  The full report of this exam was immediately signed and available to the emergency room. The patient is currently in the emergency room. Signed by Dr Meli Liz    CT Lumbar Spine WO Contrast    Result Date: 10/26/2021  EXAMINATION:  CT LUMBAR SPINE WO CONTRAST  10/26/2021 6:34 PM HISTORY: The patient fell 7 weeks ago. The patient has back pain. The patient did not have any x-rays of the lumbar spine performed in the emergency room. TECHNIQUE: Spiral CT was performed of the lumbar spine. Sagittal and coronal images were reconstructed. DLP: 8033 mGy-cm. Automated exposure control was utilized. COMPARISON: No comparison study. FINDINGS: There is atheromatous disease of the aortoiliac vessels. There are 2 cystic areas within the left ovary. The larger area measures 2.6 cm transversely. The uterus is surgically absent. The right ovary may also be surgically absent. No lumbar spine fracture is identified. There is a transitional L5 vertebral body that is at least partially sacralized. At L1-2, there is mild disc narrowing and mild disc bulging. There is mild inferior recess foraminal narrowing bilaterally. At L2-3, there is moderate disc narrowing and moderate disc bulging. There is mild facet arthropathy. There is mild narrowing of the central canal. There is mild to moderate inferior recess foraminal narrowing bilaterally. At L3-4, there is mild disc narrowing. There is moderate disc bulging. There is mild facet arthropathy. There is mild narrowing of the central canal. There is mild to moderate inferior recess foraminal narrowing bilaterally. At L4-5, there is mild to moderate disc bulging. The disc is maintained in height. There is moderate facet arthropathy. There is no significant central spinal canal narrowing. There is mild inferior recess foraminal narrowing bilaterally. At L5-S1, there is a small hypoplastic disc space. There is no spinal or foraminal stenosis. 1. No lumbar spine fracture is seen. 2. Lumbar spine degenerative changes, as described.  3. 2 cystic areas within the left ovary the largest of which measures 2.6 cm. GYN follow-up recommended. The full report of this exam was immediately signed and available to the emergency room. The patient is currently in the emergency room. Signed by Dr Nigel Perez Additional Contrast? None    Result Date: 10/26/2021  EXAMINATION:  CT ABDOMEN PELVIS W IV CONTRAST  10/26/2021 6:41 PM HISTORY: The patient fell 7 weeks ago. The patient has epigastric pain and vomiting. The patient has rib pain. TECHNIQUE: Spiral CT was performed of the abdomen and pelvis with contrast. Multiplanar images were reconstructed. DLP: 1086 mGy-cm. Automated exposure control was utilized. COMPARISON: No comparison study. LUNG BASES: Please see the chest CT report separately. LIVER AND SPLEEN: There has been prior cholecystectomy. There is mild fatty infiltration of the liver. Minimal prominence of the biliary tree is felt to represent reservoir effect. The spleen is unremarkable. There is no evidence of acute traumatic injury. PANCREAS: There is no pancreatic mass or inflammatory change. There is no evidence of acute traumatic injury. KIDNEYS AND ADRENALS: The adrenal glands and left kidney are unremarkable. There is a 1.2 cm cyst arising from the upper pole right kidney. The ureters are nondilated. The urinary bladder is unremarkable. BOWEL: There is mild underdistention of the stomach. Small bowel loops are nondilated. There is underdistention of portions of the colon. There is mild diverticulosis of the colon. OTHER: There has been prior hysterectomy. There are 2 cystic areas within the left ovary the largest of which measures 2.7 cm. The right ovary is not visible. There is no free fluid. There are degenerative changes of the spine. There is avascular necrosis of the femoral heads bilaterally. The patient has had bilateral core decompression. 1. No solid organ injury or hemoperitoneum. 2. Mild fatty liver. Prior cholecystectomy. Mild prominence of the biliary tree likely related to reservoir effect. 3. There is a 1.2 cm cyst arising from the upper pole right kidney. 4. There are 2 cystic areas within the left ovary the largest of which measures 2.7 cm. GYN follow-up recommended in a patient of this age. Small cystic neoplasms are in the differential. However, no soft tissue nodularity or septation is seen. 5. Prior hysterectomy. 6. 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 Degenerative changes of the spine. The full report of this exam was immediately signed and available to the emergency room. The patient is currently in the emergency room. Signed by Dr Reji Newton    MRI brain (2021)- nothing acute;       Reviewed referral records     ASSESSMENT:    Margo Vergara is a 73y.o. year old female here for evaluation of numbness, chronic pain, tremor. Exam is overall non focal. Recent MRI brain with , nothing acute. CT C/T/L spine with degenerative changes. Suspect that many of her complaints are related to chronic pain and anxiety. Recommend pain management, patient defers today. Plan for additional labs to exclude secondary and follow complaints clinically. ICD-10-CM    1. Numbness and tingling  R20.0 CBC Auto Differential    R20.2 Comprehensive Metabolic Panel     Electrophoresis Protein, Serum without Reflex to Immunofixation     Rheumatoid Factor     Sedimentation Rate     C-Reactive Protein     TSH WITH REFLEX TO FT4     Vitamin B12   2. Fall, initial encounter  [address] XXXA        PLAN:  1. Labs as listed above   2. Follow clinically for now  3. Recommend pain management   4. Return in about 3 months (around 4/10/2022) for follow up, sooner if worsening.     Esteban Sylvester DNP, APRN

## 2022-01-14 LAB
ALBUMIN SERPL-MCNC: 4.16 G/DL (ref 3.75–5.01)
ALPHA-1-GLOBULIN: 0.31 G/DL (ref 0.19–0.46)
ALPHA-2-GLOBULIN: 0.91 G/DL (ref 0.48–1.05)
BETA GLOBULIN: 0.95 G/DL (ref 0.48–1.1)
GAMMA GLOBULIN: 0.97 G/DL (ref 0.62–1.51)
PROTEIN ELECTROPHORESIS, SERUM: NORMAL
SPE/IFE INTERPRETATION: NORMAL
TOTAL PROTEIN: 7.3 G/DL (ref 6.3–8.2)

## 2022-01-27 ENCOUNTER — APPOINTMENT (OUTPATIENT)
Dept: MAMMOGRAPHY | Facility: HOSPITAL | Age: 72
End: 2022-01-27

## 2022-01-27 ENCOUNTER — APPOINTMENT (OUTPATIENT)
Dept: BONE DENSITY | Facility: HOSPITAL | Age: 72
End: 2022-01-27

## 2022-02-10 ENCOUNTER — TRANSCRIBE ORDERS (OUTPATIENT)
Dept: ADMINISTRATIVE | Facility: HOSPITAL | Age: 72
End: 2022-02-10

## 2022-02-10 DIAGNOSIS — N28.1 ACQUIRED CYST OF KIDNEY: Primary | ICD-10-CM

## 2022-03-07 ENCOUNTER — OFFICE VISIT (OUTPATIENT)
Dept: GASTROENTEROLOGY | Facility: CLINIC | Age: 72
End: 2022-03-07

## 2022-03-07 VITALS
HEART RATE: 75 BPM | DIASTOLIC BLOOD PRESSURE: 84 MMHG | OXYGEN SATURATION: 97 % | HEIGHT: 62 IN | TEMPERATURE: 96.6 F | WEIGHT: 160 LBS | SYSTOLIC BLOOD PRESSURE: 126 MMHG | BODY MASS INDEX: 29.44 KG/M2

## 2022-03-07 DIAGNOSIS — R13.19 ESOPHAGEAL DYSPHAGIA: Primary | ICD-10-CM

## 2022-03-07 DIAGNOSIS — Z01.818 PREOPERATIVE TESTING: Primary | ICD-10-CM

## 2022-03-07 DIAGNOSIS — Z83.71 FAMILY HX COLONIC POLYPS: ICD-10-CM

## 2022-03-07 DIAGNOSIS — Z86.010 HISTORY OF ADENOMATOUS POLYP OF COLON: ICD-10-CM

## 2022-03-07 PROCEDURE — 99204 OFFICE O/P NEW MOD 45 MIN: CPT | Performed by: NURSE PRACTITIONER

## 2022-03-07 NOTE — PROGRESS NOTES
[facility] GASTROENTEROLOGY - OFFICE NOTE    3/7/2022    Shannon Bridges   1950    Primary Physician: Kisha Byrne DO    Chief Complaint   Patient presents with   • Difficulty Swallowing         HISTORY OF PRESENT ILLNESS:         Shannon Bridges is a 71 y.o. female presents with dysphagia. This started 1 year ago. She points to the lower chest area where solid foods and pills will hang. Taking a drink will help.  No reflux symptoms. Takes omeprazole once daily. No n/v. No abdominal pain.   No weight loss.       No change in bowel habits. No rectal bleeding.         EGD 9/2012.  Colonoscopy 10/2011 colon polyp ( path tubular adenomatous). Recommended repeat colonoscopy 5 years.   Sister had colon polyps.   Maternal GGM had colon cancer.       Past Medical History:   Diagnosis Date   • Anxiety    • Asthma    • Clotting disorder (HCC)    • Colon polyp    • Depression    • Esophageal stricture    • Fatty liver    • Fibromyalgia    • Fibromyalgia    • GERD (gastroesophageal reflux disease)    • Hyperlipidemia    • Hypertension    • IBS (irritable bowel syndrome)    • Insomnia    • Ischemic colitis (HCC)    • Leukocytosis    • Rectal bleeding    • Restless leg syndrome    • RLS (restless legs syndrome)        Past Surgical History:   Procedure Laterality Date   • APPENDECTOMY     • BREAST EXCISIONAL BIOPSY Left     Dr. Dahl   • CATARACT EXTRACTION     • CHOLECYSTECTOMY     • COLONOSCOPY  10/14/2011    adenomatous polyp   • ENDOSCOPY  09/04/2012    normal   • HERNIA REPAIR     • HIP SURGERY     • HYSTERECTOMY     • LIPOMA EXCISION     • TUMOR EXCISION         Outpatient Medications Marked as Taking for the 3/7/22 encounter (Office Visit) with Ros Aleman APRN   Medication Sig Dispense Refill   • Albuterol Sulfate (PROAIR HFA IN) Inhale.     • Ascorbic Acid (VITAMIN C PO) Take  by mouth Daily.     • Cholecalciferol (VITAMIN D3 PO) Take  by mouth.     • CLORAZEPATE DIPOTASSIUM PO Take  by mouth.     •  Cyanocobalamin (VITAMIN B-12 PO) Take  by mouth Daily.     • DULoxetine (CYMBALTA) 60 MG capsule Take 60 mg by mouth Daily.     • fenofibrate 160 MG tablet Every Night.     • Loratadine 10 MG capsule Take  by mouth.     • MELATONIN PO Take  by mouth Daily.     • Multiple Vitamins-Minerals (CENTRUM SILVER PO) Take  by mouth Every Night.     • Naloxegol Oxalate (Movantik) 25 MG tablet Take 25 mg by mouth Every Morning.     • olmesartan (BENICAR) 40 MG tablet Take 40 mg by mouth Daily.     • omeprazole (priLOSEC) 40 MG capsule Take 40 mg by mouth Daily.     • ondansetron (ZOFRAN) 4 MG tablet Take 4 mg by mouth Every 8 (Eight) Hours As Needed for Nausea or Vomiting.     • oxyCODONE-acetaminophen (PERCOCET)  MG per tablet Take 1 tablet by mouth 2 (Two) Times a Day As Needed for Moderate Pain .     • pravastatin (PRAVACHOL) 20 MG tablet Take 20 mg by mouth Every Night.     • QUEtiapine XR (SEROquel XR) 200 MG 24 hr tablet Take 1 tablet by mouth Every Night.     • RESTASIS 0.05 % ophthalmic emulsion      • tiZANidine (ZANAFLEX) 4 MG tablet Take 4 mg by mouth At Night As Needed for Muscle Spasms.     • traZODone (DESYREL) 50 MG tablet Take 50 mg by mouth Every Night.         Allergies   Allergen Reactions   • Macrodantin [Nitrofurantoin] Nausea And Vomiting and Unknown - Low Severity   • Metronidazole Nausea And Vomiting and Unknown - Low Severity   • Tetracyclines & Related Nausea And Vomiting     unknown   • Azithromycin Rash   • Penicillins Rash   • Sulfa Antibiotics Rash       Social History     Socioeconomic History   • Marital status:    Tobacco Use   • Smoking status: Former Smoker     Quit date:      Years since quittin.   • Smokeless tobacco: Never Used   Substance and Sexual Activity   • Alcohol use: No   • Drug use: No       Family History   Problem Relation Age of Onset   • Breast cancer Maternal Cousin    • Colon cancer Neg Hx        Review of Systems   Constitutional: Negative for  "chills, fever and unexpected weight change.   HENT: Positive for trouble swallowing.    Respiratory: Negative for cough, shortness of breath and wheezing.    Cardiovascular: Negative for chest pain and palpitations.   Gastrointestinal: Negative for abdominal distention, abdominal pain, anal bleeding, blood in stool, constipation, diarrhea, nausea and vomiting.        Vitals:    03/07/22 1346   BP: 126/84   Pulse: 75   Temp: 96.6 °F (35.9 °C)   SpO2: 97%   Weight: 72.6 kg (160 lb)   Height: 157.5 cm (62\")      Body mass index is 29.26 kg/m².    Physical Exam  Vitals reviewed.   Constitutional:       General: She is not in acute distress.  Cardiovascular:      Rate and Rhythm: Normal rate and regular rhythm.      Heart sounds: Normal heart sounds.   Pulmonary:      Effort: Pulmonary effort is normal.      Breath sounds: Normal breath sounds.   Abdominal:      General: Bowel sounds are normal. There is no distension.      Palpations: Abdomen is soft.      Tenderness: There is no abdominal tenderness.   Skin:     General: Skin is warm and dry.   Neurological:      Mental Status: She is alert.                 ASSESSMENT AND PLAN    Assessment/Plan     Diagnoses and all orders for this visit:    1. Esophageal dysphagia (Primary)  -     Case Request; Standing  -     Case Request    2. History of adenomatous polyp of colon    3. Family hx colonic polyps    Other orders  -     Follow Anesthesia Guidelines / Protocol; Future  -     Obtain Informed Consent; Future      In regards to dysphagia, differential diagnoses discussed.   Recommend cut food small pieces and chew well.  I recommend continue omeprazole daily. I recommend egd for further evaluation and she is agreeable.       She is overdue for colonoscopy. I strongly recommended repeat colonoscopy. She understands the colonoscopy is to try to prevent colon cancer however does not want to have done at this time. She will contact us if she changes her mind. "           ESOPHAGOGASTRODUODENOSCOPY WITH ANESTHESIA (N/A)  Risk, benefits, and alternatives of endoscopy were explained in full.  They understand that there is a risk of bleeding, perforation, and infection.  The risk of perforation goes up with esophageal dilation.  Other options to evaluate UGI complaints could involve barium swallow or UGI series, but these would be diagnostic tests only.  Patient was given time to ask questions.  I answered them to their satisfaction and they are agreeable to proceeding.                  Ros Aleman, APRN

## 2022-03-09 PROBLEM — R13.19 ESOPHAGEAL DYSPHAGIA: Status: ACTIVE | Noted: 2022-03-09

## 2022-03-14 ENCOUNTER — HOSPITAL ENCOUNTER (OUTPATIENT)
Dept: BONE DENSITY | Facility: HOSPITAL | Age: 72
Discharge: HOME | End: 2022-03-14

## 2022-03-14 ENCOUNTER — HOSPITAL ENCOUNTER (OUTPATIENT)
Dept: ULTRASOUND IMAGING | Facility: HOSPITAL | Age: 72
Discharge: HOME | End: 2022-03-14

## 2022-03-14 ENCOUNTER — HOSPITAL ENCOUNTER (OUTPATIENT)
Dept: MAMMOGRAPHY | Facility: HOSPITAL | Age: 72
Discharge: HOME | End: 2022-03-14

## 2022-03-14 DIAGNOSIS — N28.1 ACQUIRED CYST OF KIDNEY: ICD-10-CM

## 2022-03-14 DIAGNOSIS — M81.0 AGE-RELATED OSTEOPOROSIS WITHOUT CURRENT PATHOLOGICAL FRACTURE: ICD-10-CM

## 2022-03-14 DIAGNOSIS — Z13.820 SPECIAL SCREENING FOR OSTEOPOROSIS: ICD-10-CM

## 2022-03-14 DIAGNOSIS — Z12.31 ENCOUNTER FOR SCREENING MAMMOGRAM FOR MALIGNANT NEOPLASM OF BREAST: ICD-10-CM

## 2022-03-14 DIAGNOSIS — M85.80 OTHER SPECIFIED DISORDERS OF BONE DENSITY AND STRUCTURE, UNSPECIFIED SITE: ICD-10-CM

## 2022-03-14 PROCEDURE — 77080 DXA BONE DENSITY AXIAL: CPT

## 2022-03-14 PROCEDURE — 76775 US EXAM ABDO BACK WALL LIM: CPT

## 2022-03-22 ENCOUNTER — TELEPHONE (OUTPATIENT)
Dept: GASTROENTEROLOGY | Facility: CLINIC | Age: 72
End: 2022-03-22

## 2022-03-31 ENCOUNTER — TRANSCRIBE ORDERS (OUTPATIENT)
Dept: OTHER | Facility: OTHER | Age: 72
End: 2022-03-31

## 2022-03-31 ENCOUNTER — TRANSCRIBE ORDERS (OUTPATIENT)
Dept: ADMINISTRATIVE | Facility: HOSPITAL | Age: 72
End: 2022-03-31

## 2022-03-31 DIAGNOSIS — N64.59 OTHER SIGNS AND SYMPTOMS IN BREAST: ICD-10-CM

## 2022-03-31 DIAGNOSIS — N63.0 UNSPECIFIED LUMP IN UNSPECIFIED BREAST: Primary | ICD-10-CM

## 2022-04-01 ENCOUNTER — APPOINTMENT (OUTPATIENT)
Dept: MAMMOGRAPHY | Facility: HOSPITAL | Age: 72
End: 2022-04-01

## 2022-04-08 ENCOUNTER — APPOINTMENT (OUTPATIENT)
Dept: MAMMOGRAPHY | Facility: HOSPITAL | Age: 72
End: 2022-04-08

## 2022-04-08 ENCOUNTER — APPOINTMENT (OUTPATIENT)
Dept: ULTRASOUND IMAGING | Facility: HOSPITAL | Age: 72
End: 2022-04-08

## 2022-04-15 ENCOUNTER — HOSPITAL ENCOUNTER (OUTPATIENT)
Dept: ULTRASOUND IMAGING | Facility: HOSPITAL | Age: 72
Discharge: HOME | End: 2022-04-15

## 2022-04-15 ENCOUNTER — HOSPITAL ENCOUNTER (OUTPATIENT)
Dept: MAMMOGRAPHY | Facility: HOSPITAL | Age: 72
Discharge: HOME | End: 2022-04-15

## 2022-04-15 DIAGNOSIS — N64.59 OTHER SIGNS AND SYMPTOMS IN BREAST: ICD-10-CM

## 2022-04-15 PROCEDURE — 76642 ULTRASOUND BREAST LIMITED: CPT

## 2022-04-15 PROCEDURE — G0279 TOMOSYNTHESIS, MAMMO: HCPCS

## 2022-04-15 PROCEDURE — 77066 DX MAMMO INCL CAD BI: CPT

## 2022-08-17 ENCOUNTER — OFFICE VISIT (OUTPATIENT)
Dept: NEUROSURGERY | Age: 72
End: 2022-08-17
Payer: MEDICARE

## 2022-08-17 ENCOUNTER — HOSPITAL ENCOUNTER (OUTPATIENT)
Dept: GENERAL RADIOLOGY | Age: 72
Discharge: HOME | End: 2022-08-17
Payer: MEDICARE

## 2022-08-17 VITALS
HEART RATE: 87 BPM | BODY MASS INDEX: 29.44 KG/M2 | SYSTOLIC BLOOD PRESSURE: 117 MMHG | DIASTOLIC BLOOD PRESSURE: 78 MMHG | HEIGHT: 62 IN | WEIGHT: 160 LBS | OXYGEN SATURATION: 100 %

## 2022-08-17 DIAGNOSIS — G89.29 CHRONIC RIGHT SHOULDER PAIN: ICD-10-CM

## 2022-08-17 DIAGNOSIS — R20.2 NUMBNESS AND TINGLING: ICD-10-CM

## 2022-08-17 DIAGNOSIS — G89.29 CHRONIC RIGHT SHOULDER PAIN: Primary | ICD-10-CM

## 2022-08-17 DIAGNOSIS — R20.0 NUMBNESS AND TINGLING: ICD-10-CM

## 2022-08-17 DIAGNOSIS — R25.1 TREMOR: ICD-10-CM

## 2022-08-17 DIAGNOSIS — M25.511 CHRONIC RIGHT SHOULDER PAIN: ICD-10-CM

## 2022-08-17 DIAGNOSIS — M25.511 CHRONIC RIGHT SHOULDER PAIN: Primary | ICD-10-CM

## 2022-08-17 PROCEDURE — 73030 X-RAY EXAM OF SHOULDER: CPT

## 2022-08-17 PROCEDURE — 73010 X-RAY EXAM OF SHOULDER BLADE: CPT

## 2022-08-17 PROCEDURE — G8417 CALC BMI ABV UP PARAM F/U: HCPCS | Performed by: NURSE PRACTITIONER

## 2022-08-17 PROCEDURE — 73030 X-RAY EXAM OF SHOULDER: CPT | Performed by: RADIOLOGY

## 2022-08-17 PROCEDURE — 1036F TOBACCO NON-USER: CPT | Performed by: NURSE PRACTITIONER

## 2022-08-17 PROCEDURE — 1123F ACP DISCUSS/DSCN MKR DOCD: CPT | Performed by: NURSE PRACTITIONER

## 2022-08-17 PROCEDURE — 3017F COLORECTAL CA SCREEN DOC REV: CPT | Performed by: NURSE PRACTITIONER

## 2022-08-17 PROCEDURE — 73010 X-RAY EXAM OF SHOULDER BLADE: CPT | Performed by: RADIOLOGY

## 2022-08-17 PROCEDURE — G8427 DOCREV CUR MEDS BY ELIG CLIN: HCPCS | Performed by: NURSE PRACTITIONER

## 2022-08-17 PROCEDURE — G8400 PT W/DXA NO RESULTS DOC: HCPCS | Performed by: NURSE PRACTITIONER

## 2022-08-17 PROCEDURE — 1090F PRES/ABSN URINE INCON ASSESS: CPT | Performed by: NURSE PRACTITIONER

## 2022-08-17 PROCEDURE — 99213 OFFICE O/P EST LOW 20 MIN: CPT | Performed by: NURSE PRACTITIONER

## 2022-08-17 RX ORDER — DOXEPIN HYDROCHLORIDE 25 MG/1
25 CAPSULE ORAL NIGHTLY
COMMUNITY

## 2022-08-17 RX ORDER — NALOXEGOL OXALATE 25 MG/1
TABLET, FILM COATED ORAL
COMMUNITY
Start: 2022-06-28

## 2022-08-17 RX ORDER — LORATADINE 10 MG/1
10 TABLET ORAL DAILY
COMMUNITY

## 2022-08-17 RX ORDER — PRAVASTATIN SODIUM 40 MG
TABLET ORAL
COMMUNITY
Start: 2022-06-28

## 2022-08-17 NOTE — PROGRESS NOTES
[id number] [facility] Neurology Office Note      Patient:   Daniel Colbert  MR#:    [id number]  Account Number:                         YOB: 1950  Date of Evaluation:  8/17/2022  Time of Note:                          2:09 PM  Primary/Referring Physician:  Jemima Lutz DO   Consulting Physician:  Esther Lagunas, BRYAN, APRN    FOLLOW UP    Chief Complaint   Patient presents with    Follow-up     Pt states things are about the same     Numbness     HISTORY OF PRESENT ILLNESS    Daniel Colbert is a 81y.o. year old female here for follow up of tremors, numbness, falls. Largely unchanged from prior visit. She has noted more right shoulder pain, feels like ROM is limited. States it began hurting when she fall in October 2021. Has not fallen recently. Notes tremor. Feels that tremors have worsened. Tremor is in bilateral arms. No clear resting tremor. Worsens with intention/posture. Has noted that handwriting has messier. Notes intermittent gait changes, feels imbalanced. She will note intermittent bouncing type tremor in her legs if she holds them in a certain position. Continues to note intermittent numbness. Can occur in her hands or feet. Typically occurs at night or if she is resting in certain positions. Symptoms will last for 20-30 minutes and then resolves, no clear residual/returns to baseline. She notes knee pain and foot pain. Notes lower back pain with radiation into bilateral hips. Family history with mother having parkinsons. Past Medical History:   Diagnosis Date    Anemia     Arthritis     Asthma     Hypertension        Past Surgical History:   Procedure Laterality Date    HC INJECT OTHER PERPHRL NERV Bilateral 8/18/2017    BILATERAL FLURO GUIDED TROCHANTERIC BURSA HIP INJECTIONS performed by Leon Iverson MD at [facility]       History reviewed. No pertinent family history.     Social History     Socioeconomic History    Marital status:      Spouse name: Not on file    Number of children: Not on file    Years of education: Not on file    Highest education level: Not on file   Occupational History    Not on file   Tobacco Use    Smoking status: Former     Types: Cigarettes     Quit date: 1/10/2006     Years since quittin.6    Smokeless tobacco: Never   Vaping Use    Vaping Use: Never used   Substance and Sexual Activity    Alcohol use: Not Currently    Drug use: Not Currently    Sexual activity: Not on file   Other Topics Concern    Not on file   Social History Narrative    Not on file     Social Determinants of Health     Financial Resource Strain: Not on file   Food Insecurity: Not on file   Transportation Needs: Not on file   Physical Activity: Not on file   Stress: Not on file   Social Connections: Not on file   Intimate Partner Violence: Not on file   Housing Stability: Not on file       Current Outpatient Medications   Medication Sig Dispense Refill    MOVANTIK 25 MG TABS tablet       pravastatin (PRAVACHOL) 40 MG tablet       doxepin (SINEQUAN) 25 MG capsule Take 25 mg by mouth nightly      loratadine (CLARITIN) 10 MG tablet Take 10 mg by mouth daily      olmesartan (BENICAR) 40 MG tablet Take 40 mg by mouth daily      traZODone (DESYREL) 100 MG tablet Take 100 mg by mouth nightly      ondansetron (ZOFRAN ODT) 4 MG disintegrating tablet Take 1 tablet by mouth every 8 hours as needed for Nausea or Vomiting 30 tablet 0    FENOFIBRATE PO Take 160 mg by mouth daily      omeprazole 20 MG EC tablet Take 40 mg by mouth daily       oxyCODONE-acetaminophen (PERCOCET) 7.5-325 MG per tablet Take 1 tablet by mouth every 4 hours as needed for Pain . Clorazepate Dipotassium (TRANXENE-T PO) Take by mouth      Cholecalciferol (VITAMIN D-3 PO) Take by mouth       No current facility-administered medications for this visit.        Allergies   Allergen Reactions    Flagyl [Metronidazole]     Macrodantin [Nitrofurantoin]     Pcn [Penicillins]     Sulfa Antibiotics      REVIEW OF SYSTEMS  Constitutional: []Fever []Sweats []Chills [] Recent Injury [x] Denies all unless marked  HEENT:[]Headache  [] Head Injury [] Hearing Loss  [] Sore Throat  [] Ear Ache [x] Denies all unless marked  Spine:  [] Neck pain  [] Back pain  [] Sciaticia  [x] Denies all unless marked  Cardiovascular:[]Heart Disease []Palpitations [] Chest Pain   [x] Denies all unless marked  Pulmonary: []Shortness of Breath []Cough   [x] Denies all unless marked  Psychiatric/Behavioral:[] Depression [] Anxiety [x] Denies all unless marked  Gastrointestinal: []Nausea  []Vomiting  []Abdominal Pain  []Constipation  []Diarrhea  [x] Denies all unless marked  Genitourinary:   [] Frequency  [] Urgency  [] Dysuria [] Incontinence  [x] Denies all unless marked  Extremities: []Pain  []Swelling  [x] Denies all unless marked  Musculoskeletal: [] Myalgias  [] Joint Pain  [] Arthritis [] Muscle Cramps [] Muscle Twitches  [x] Denies all unless marked  Sleep: []Insomnia[]Snoring []Restless Legs  []Sleep Apnea  []Daytime Sleepiness  [x] Denies all unless marked  Skin:[] Rash [] Color Change [x] Denies all unless marked  Neurological:[]Visual Disturbance [] Memory Loss []Loss of Balance []Slurred Speech []Weakness []Seizures  [] Dizziness [x] Denies all unless marked    The MA has completed the ROS with the patient. I have reviewed it in its' entirety with the patient and agree with the documentation.      PHYSICAL EXAM  /78   Pulse 87   Ht 5' 2\" (1.575 m)   Wt 160 lb (72.6 kg)   SpO2 100%   BMI 29.26 kg/m²       Constitutional - No acute distress    HEENT- Conjunctiva normal.  No scars, masses, or lesions over external nose or ears, no neck masses noted, no jugular vein distension, no bruit  Cardiac- Regular rate and rhythm  Pulmonary- Good expansion, normal effort without use of accessory muscles  Musculoskeletal - No significant wasting of muscles noted, no bony deformities  Extremities - No clubbing, cyanosis or edema  Skin - Warm, dry, and intact. No rash, erythema, or pallor  Psychiatric - Mood, affect, and behavior appear normal      NEUROLOGICAL EXAM     Mental status   [x] Awake, alert, oriented   [x]Affect attention and concentration appear appropriate  [x]Recent and remote memory appears unremarkable  [x]Speech normal without dysarthria or aphasia, comprehension and repetition intact. COMMENTS:    Cranial Nerves [x]No VF deficit to confrontation,  no papilledema on fundoscopic exam.  [x]PERRLA, EOMI, no nystagmus, conjugate eye movements, no ptosis  [x]Face symmetric  [x]Facial sensation intact  [x]Tongue midline no atrophy or fasciculations present  [x]Palate midline, hearing to finger rub normal bilaterally  [x]Shoulder shrug and SCM testing normal bilaterally  COMMENTS:   Motor   [x]5/5 strength x 4 extremities  [x]Normal bulk and tone  []No tremor present  [x]No rigidity or bradykinesia noted  COMMENTS: mild positional tremor BUE    Sensory  [x]Sensation intact to light touch, pin prick, vibration, and proprioception BLE  []Sensation intact to light touch, pin prick, vibration, and proprioception BUE  COMMENTS: decreased PP left hand, distal    Coordination [x]FTN normal bilaterally   [x]HTS normal bilaterally  [x]ZOLTAN normal bilaterally.    COMMENTS:    Reflexes  [x]Symmetric and non-pathological  [x]Toes down going bilaterally  [x]No clonus present  COMMENTS:   Gait                  []Normal steady gait    []Ataxic    []Spastic     []Magnetic     []Shuffling  COMMENTS: cautious        LABS RECORD AND IMAGING REVIEW (As below and per HPI)    Lab Results   Component Value Date    NWXZSBKZ01 >2000 (H) 01/10/2022     Lab Results   Component Value Date    WBC 7.1 01/10/2022    HGB 12.0 01/10/2022    HCT 38.8 01/10/2022    MCV 88.4 01/10/2022     01/10/2022     Lab Results   Component Value Date     (L) 01/10/2022    K 3.5 01/10/2022    CL 95 (L) 01/10/2022    CO2 28 01/10/2022    BUN 17 01/10/2022    CREATININE 1.0 (H) 01/10/2022    GLUCOSE 86 01/10/2022    CALCIUM 9.5 01/10/2022    PROT 7.3 01/10/2022    PROT 7.3 01/10/2022    LABALBU 4.16 01/10/2022    LABALBU 4.3 01/10/2022    BILITOT <0.2 01/10/2022    ALKPHOS 54 01/10/2022    AST 20 01/10/2022    ALT 15 01/10/2022    LABGLOM 55 (A) 01/10/2022    GFRAA >59 01/10/2022     No results found for: CHOL, TRIG, HDL, LDLCALC  No results found for: TSH, T4FREE  Lab Results   Component Value Date    CRP 0.61 (H) 01/10/2022    SEDRATE 8 01/10/2022        XR KNEE LEFT (1-2 VIEWS)    Result Date: 10/26/2021  EXAMINATION:  XR KNEE LEFT (1-2 VIEWS), XR KNEE RIGHT (1-2 VIEWS), XR ANKLE RIGHT (MIN 3 VIEWS), XR ANKLE LEFT (MIN 3 VIEWS)  10/26/2021 4:42 PM HISTORY: Bilateral knee pain. Bilateral ankle pain. COMPARISON: No comparison study. TECHNIQUE: 2 views were obtained of both knees. 3 views of both ankles. LEFT KNEE:  There is no fracture or joint effusion. There is mild narrowing of all 3 compartments. There is no significant spurring. RIGHT KNEE: There is no fracture or joint effusion. There is mild narrowing of all 3 compartments. There is no significant spurring. RIGHT ANKLE: The ankle joint space is maintained. There is no evidence of fracture. There may be very mild soft tissue edema involving the lower leg. LEFT ANKLE: The ankle joint space is well maintained. There is no evidence of ankle fracture. There is soft tissue swelling/edema of the lower leg and ankle. As above. Signed by Dr Roya Vigil    XR KNEE RIGHT (1-2 VIEWS)    Result Date: 10/26/2021  EXAMINATION:  XR KNEE LEFT (1-2 VIEWS), XR KNEE RIGHT (1-2 VIEWS), XR ANKLE RIGHT (MIN 3 VIEWS), XR ANKLE LEFT (MIN 3 VIEWS)  10/26/2021 4:42 PM HISTORY: Bilateral knee pain. Bilateral ankle pain. COMPARISON: No comparison study. TECHNIQUE: 2 views were obtained of both knees. 3 views of both ankles. LEFT KNEE:  There is no fracture or joint effusion. There is mild narrowing of all 3 compartments. There is no significant spurring. RIGHT KNEE: There is no fracture or joint effusion. There is mild narrowing of all 3 compartments. There is no significant spurring. RIGHT ANKLE: The ankle joint space is maintained. There is no evidence of fracture. There may be very mild soft tissue edema involving the lower leg. LEFT ANKLE: The ankle joint space is well maintained. There is no evidence of ankle fracture. There is soft tissue swelling/edema of the lower leg and ankle. As above. Signed by Dr Isaias López    XR ANKLE LEFT (MIN 3 VIEWS)    Result Date: 10/26/2021  EXAMINATION:  XR KNEE LEFT (1-2 VIEWS), XR KNEE RIGHT (1-2 VIEWS), XR ANKLE RIGHT (MIN 3 VIEWS), XR ANKLE LEFT (MIN 3 VIEWS)  10/26/2021 4:42 PM HISTORY: Bilateral knee pain. Bilateral ankle pain. COMPARISON: No comparison study. TECHNIQUE: 2 views were obtained of both knees. 3 views of both ankles. LEFT KNEE:  There is no fracture or joint effusion. There is mild narrowing of all 3 compartments. There is no significant spurring. RIGHT KNEE: There is no fracture or joint effusion. There is mild narrowing of all 3 compartments. There is no significant spurring. RIGHT ANKLE: The ankle joint space is maintained. There is no evidence of fracture. There may be very mild soft tissue edema involving the lower leg. LEFT ANKLE: The ankle joint space is well maintained. There is no evidence of ankle fracture. There is soft tissue swelling/edema of the lower leg and ankle. As above. Signed by Dr Isaias López    XR ANKLE RIGHT (MIN 3 VIEWS)    Result Date: 10/26/2021  EXAMINATION:  XR KNEE LEFT (1-2 VIEWS), XR KNEE RIGHT (1-2 VIEWS), XR ANKLE RIGHT (MIN 3 VIEWS), XR ANKLE LEFT (MIN 3 VIEWS)  10/26/2021 4:42 PM HISTORY: Bilateral knee pain. Bilateral ankle pain. COMPARISON: No comparison study. TECHNIQUE: 2 views were obtained of both knees. 3 views of both ankles. LEFT KNEE:  There is no fracture or joint effusion. There is mild narrowing of all 3 compartments.  There is no significant spurring. RIGHT KNEE: There is no fracture or joint effusion. There is mild narrowing of all 3 compartments. There is no significant spurring. RIGHT ANKLE: The ankle joint space is maintained. There is no evidence of fracture. There may be very mild soft tissue edema involving the lower leg. LEFT ANKLE: The ankle joint space is well maintained. There is no evidence of ankle fracture. There is soft tissue swelling/edema of the lower leg and ankle. As above. Signed by Dr Stephan Alonso    CT HEAD WO CONTRAST    Result Date: 10/26/2021  EXAMINATION:  CT HEAD WO CONTRAST  10/26/2021 6:29 PM HISTORY: The patient fell 7 weeks ago. The patient has headache. TECHNIQUE: Multiple axial images were obtained through the brain without contrast infusion. Multiplanar images were reconstructed. DLP: 856 mGy-cm. Automated exposure control was utilized. COMPARISON: No comparison study. FINDINGS: There are no hemorrhage, edema or mass effect. There is mild atrophy. The ventricular system is nondilated. No calvarial fracture is seen. The visualized paranasal sinuses are clear. The mastoid air cells are clear. 1. There are no hemorrhage, edema or mass effect. There are no acute findings. 2. Mild atrophy. The full report of this exam was immediately signed and available to the emergency room. The patient is currently in the emergency room. Signed by Dr Loree Fonseca    Result Date: 10/26/2021  EXAMINATION:  CT CHEST W CONTRAST  10/26/2021 6:49 PM HISTORY: The patient fell 7 weeks ago and has rib pain. The patient did not have a chest x-ray or rib series today. COMPARISON: No comparison study. DLP: 5756 mGy-cm. Automated exposure control was utilized. TECHNIQUE: Spiral CT was performed of the chest with contrast. Multiplanar images were reconstructed. MEDIASTINUM/VASCULAR: There is minimal atheromatous disease of the thoracic aorta. There is no evidence of acute traumatic aortic injury.  There is minimal coronary artery calcification. Heart size is mildly prominent. There are no pathologically enlarged mediastinal or hilar lymph nodes. LUNGS: There is minimal dependent atelectasis. There is minimal atelectasis or scarring in the right upper lobe inferiorly adjacent to the minor fissure image 66 of series 6, images 58 and 59 series 7 and image 131 series 8. UPPER ABDOMEN: Please see the abdomen and pelvis CT report separately. BONES AND SOFT TISSUES: There are degenerative changes of the thoracic spine. The sternum is intact. No displaced rib fracture is seen. The scapulae are intact. The clavicles are intact. 1. No evidence of lung contusion or pneumothorax. Mild dependent atelectasis. Minimal atelectasis or scarring in the right upper lobe inferiorly adjacent to the minor fissure. 2. Minimal atheromatous disease of the thoracic aorta and coronary arteries. No evidence of mediastinal hematoma/hemorrhage. 3. No acute bony abnormality is seen. The full report of this exam was immediately signed and available to the emergency room. The patient is currently in the emergency room. Signed by Dr Roman Dunlap    CT Cervical Spine WO Contrast    Result Date: 10/26/2021  EXAMINATION:  CT CERVICAL SPINE WO CONTRAST  10/26/2021 6:31 PM HISTORY: The patient fell 7 weeks ago. Headaches. No x-rays were obtained. TECHNIQUE: Spiral CT was performed of the cervical spine. Sagittal and coronal images were reconstructed. COMPARISON: No comparison study. DLP: 491 mGy-cm. Automated exposure control was utilized. FINDINGS: The cervical spine demonstrates normal alignment. No fractures identified. There is degenerative disc disease with uncinate spurring at C5-6 and C6-7. There is moderate right-sided foraminal narrowing at C6-7.    1. No evidence of cervical spine fracture. 2. Degenerative changes of the cervical spine, as described. The full report of this exam was immediately signed and available to the emergency room.  The patient is currently in the emergency room. Signed by Dr Ria Ortega    Result Date: 10/26/2021  EXAMINATION:  CT THORACIC SPINE WO CONTRAST  10/26/2021 6:39 PM HISTORY: The patient fell 7 weeks ago. The patient did not have any thoracic spine x-rays today. The patient has back pain. COMPARISON: No comparison study. TECHNIQUE: Spiral CT was performed of the thoracic spine. Sagittal and coronal images were reconstructed. DLP: 7223 mGY-cm. Automated exposure control was utilized. FINDINGS: There is no evidence of thoracic spine fracture. There is mild disc narrowing and endplate spurring at multiple thoracic levels. There is no significant spinal or foraminal stenosis identified. No acute findings. The full report of this exam was immediately signed and available to the emergency room. The patient is currently in the emergency room. Signed by Dr Stephan Alonso    CT Lumbar Spine WO Contrast    Result Date: 10/26/2021  EXAMINATION:  CT LUMBAR SPINE WO CONTRAST  10/26/2021 6:34 PM HISTORY: The patient fell 7 weeks ago. The patient has back pain. The patient did not have any x-rays of the lumbar spine performed in the emergency room. TECHNIQUE: Spiral CT was performed of the lumbar spine. Sagittal and coronal images were reconstructed. DLP: 5822 mGy-cm. Automated exposure control was utilized. COMPARISON: No comparison study. FINDINGS: There is atheromatous disease of the aortoiliac vessels. There are 2 cystic areas within the left ovary. The larger area measures 2.6 cm transversely. The uterus is surgically absent. The right ovary may also be surgically absent. No lumbar spine fracture is identified. There is a transitional L5 vertebral body that is at least partially sacralized. At L1-2, there is mild disc narrowing and mild disc bulging. There is mild inferior recess foraminal narrowing bilaterally. At L2-3, there is moderate disc narrowing and moderate disc bulging.  There is mild facet arthropathy. There is mild narrowing of the central canal. There is mild to moderate inferior recess foraminal narrowing bilaterally. At L3-4, there is mild disc narrowing. There is moderate disc bulging. There is mild facet arthropathy. There is mild narrowing of the central canal. There is mild to moderate inferior recess foraminal narrowing bilaterally. At L4-5, there is mild to moderate disc bulging. The disc is maintained in height. There is moderate facet arthropathy. There is no significant central spinal canal narrowing. There is mild inferior recess foraminal narrowing bilaterally. At L5-S1, there is a small hypoplastic disc space. There is no spinal or foraminal stenosis. 1. No lumbar spine fracture is seen. 2. Lumbar spine degenerative changes, as described. 3. 2 cystic areas within the left ovary the largest of which measures 2.6 cm. GYN follow-up recommended. The full report of this exam was immediately signed and available to the emergency room. The patient is currently in the emergency room. Signed by Dr Raymond Porter Additional Contrast? None    Result Date: 10/26/2021  EXAMINATION:  CT ABDOMEN PELVIS W IV CONTRAST  10/26/2021 6:41 PM HISTORY: The patient fell 7 weeks ago. The patient has epigastric pain and vomiting. The patient has rib pain. TECHNIQUE: Spiral CT was performed of the abdomen and pelvis with contrast. Multiplanar images were reconstructed. DLP: 0002 mGy-cm. Automated exposure control was utilized. COMPARISON: No comparison study. LUNG BASES: Please see the chest CT report separately. LIVER AND SPLEEN: There has been prior cholecystectomy. There is mild fatty infiltration of the liver. Minimal prominence of the biliary tree is felt to represent reservoir effect. The spleen is unremarkable. There is no evidence of acute traumatic injury. PANCREAS: There is no pancreatic mass or inflammatory change. There is no evidence of acute traumatic injury. KIDNEYS AND ADRENALS: The adrenal glands and left kidney are unremarkable. There is a 1.2 cm cyst arising from the upper pole right kidney. The ureters are nondilated. The urinary bladder is unremarkable. BOWEL: There is mild underdistention of the stomach. Small bowel loops are nondilated. There is underdistention of portions of the colon. There is mild diverticulosis of the colon. OTHER: There has been prior hysterectomy. There are 2 cystic areas within the left ovary the largest of which measures 2.7 cm. The right ovary is not visible. There is no free fluid. There are degenerative changes of the spine. There is avascular necrosis of the femoral heads bilaterally. The patient has had bilateral core decompression. 1. No solid organ injury or hemoperitoneum. 2. Mild fatty liver. Prior cholecystectomy. Mild prominence of the biliary tree likely related to reservoir effect. 3. There is a 1.2 cm cyst arising from the upper pole right kidney. 4. There are 2 cystic areas within the left ovary the largest of which measures 2.7 cm. GYN follow-up recommended in a patient of this age. Small cystic neoplasms are in the differential. However, no soft tissue nodularity or septation is seen. 5. Prior hysterectomy. 6. 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 Degenerative changes of the spine. The full report of this exam was immediately signed and available to the emergency room. The patient is currently in the emergency room. Signed by Dr Jacek Arnett    MRI brain (2021)- nothing acute;       Reviewed referral records     ASSESSMENT:    Mehul De La Cruz is a 81y.o. year old female here for follow up of numbness, chronic pain, tremor. Largely unchanged from prior. Exam is non focal today, mild positional tremor noted. Recent MRI brain with , nothing acute. CT C/T/L spine with degenerative changes. Additional labs unremarkable for secondary source.  Suspect that many of her complaints are related to chronic pain and anxiety. Patient would benefit from pain management, she defers. Will add shoulder x-ray and refer to ortho today, shoulder pain seems to be her biggest complaint today. ICD-10-CM    1. Chronic right shoulder pain  M25.511 XR SCAPULA RIGHT (COMPLETE)    G89.29 Dillan Bustillo MD, Orthopaedic Surgery, [place]     XR SHOULDER RIGHT (MIN 2 VIEWS)      2. Numbness and tingling  R20.0     R20.2       3. Tremor  R25.1         PLAN:  1. Right shoulder x-ray   2. Orthopedic referral for right shoulder pain   3. Follow tremor clinically for now   4. Recommend pain management   5. Return in about 6 months (around 2/17/2023) for follow up, sooner if worsening.     Patricia Kingsley DNP, APRN

## 2022-08-18 ENCOUNTER — TELEPHONE (OUTPATIENT)
Dept: NEUROSURGERY | Age: 72
End: 2022-08-18

## 2022-08-18 NOTE — TELEPHONE ENCOUNTER
----- Message from GARY Callahan sent at 8/18/2022 12:27 PM CDT -----  Results are normal. Continue with ortho referral

## 2022-10-03 ENCOUNTER — APPOINTMENT (OUTPATIENT)
Dept: CT IMAGING | Facility: HOSPITAL | Age: 72
End: 2022-10-03

## 2022-10-03 ENCOUNTER — HOSPITAL ENCOUNTER (EMERGENCY)
Facility: HOSPITAL | Age: 72
Discharge: HOME | End: 2022-10-04
Attending: EMERGENCY MEDICINE | Admitting: EMERGENCY MEDICINE

## 2022-10-03 DIAGNOSIS — K04.7 ABSCESSED TOOTH: ICD-10-CM

## 2022-10-03 DIAGNOSIS — R55 SYNCOPE AND COLLAPSE: Primary | ICD-10-CM

## 2022-10-03 LAB
ALBUMIN SERPL-MCNC: 4.1 G/DL (ref 3.5–5.2)
ALBUMIN/GLOB SERPL: 1.2 G/DL
ALP SERPL-CCNC: 55 U/L (ref 39–117)
ALT SERPL W P-5'-P-CCNC: 19 U/L (ref 1–33)
ANION GAP SERPL CALCULATED.3IONS-SCNC: 9 MMOL/L (ref 5–15)
AST SERPL-CCNC: 24 U/L (ref 1–32)
BASOPHILS # BLD AUTO: 0.06 10*3/MM3 (ref 0–0.2)
BASOPHILS NFR BLD AUTO: 0.7 % (ref 0–1.5)
BILIRUB SERPL-MCNC: <0.2 MG/DL (ref 0–1.2)
BUN SERPL-MCNC: 13 MG/DL (ref 8–23)
BUN/CREAT SERPL: 13.4 (ref 7–25)
CALCIUM SPEC-SCNC: 9.4 MG/DL (ref 8.6–10.5)
CHLORIDE SERPL-SCNC: 95 MMOL/L (ref 98–107)
CO2 SERPL-SCNC: 30 MMOL/L (ref 22–29)
CREAT BLDA-MCNC: 1 MG/DL (ref 0.6–1.3)
CREAT SERPL-MCNC: 0.97 MG/DL (ref 0.57–1)
D-LACTATE SERPL-SCNC: 1.4 MMOL/L (ref 0.5–2)
DEPRECATED RDW RBC AUTO: 40.9 FL (ref 37–54)
EGFRCR SERPLBLD CKD-EPI 2021: 63 ML/MIN/1.73
EOSINOPHIL # BLD AUTO: 0.42 10*3/MM3 (ref 0–0.4)
EOSINOPHIL NFR BLD AUTO: 5.1 % (ref 0.3–6.2)
ERYTHROCYTE [DISTWIDTH] IN BLOOD BY AUTOMATED COUNT: 13.2 % (ref 12.3–15.4)
GLOBULIN UR ELPH-MCNC: 3.3 GM/DL
GLUCOSE SERPL-MCNC: 133 MG/DL (ref 65–99)
HCT VFR BLD AUTO: 34.8 % (ref 34–46.6)
HGB BLD-MCNC: 11.2 G/DL (ref 12–15.9)
IMM GRANULOCYTES # BLD AUTO: 0.02 10*3/MM3 (ref 0–0.05)
IMM GRANULOCYTES NFR BLD AUTO: 0.2 % (ref 0–0.5)
LYMPHOCYTES # BLD AUTO: 1.01 10*3/MM3 (ref 0.7–3.1)
LYMPHOCYTES NFR BLD AUTO: 12.2 % (ref 19.6–45.3)
MCH RBC QN AUTO: 27.3 PG (ref 26.6–33)
MCHC RBC AUTO-ENTMCNC: 32.2 G/DL (ref 31.5–35.7)
MCV RBC AUTO: 84.9 FL (ref 79–97)
MONOCYTES # BLD AUTO: 0.93 10*3/MM3 (ref 0.1–0.9)
MONOCYTES NFR BLD AUTO: 11.3 % (ref 5–12)
NEUTROPHILS NFR BLD AUTO: 5.82 10*3/MM3 (ref 1.7–7)
NEUTROPHILS NFR BLD AUTO: 70.5 % (ref 42.7–76)
NRBC BLD AUTO-RTO: 0 /100 WBC (ref 0–0.2)
PLATELET # BLD AUTO: 369 10*3/MM3 (ref 140–450)
PMV BLD AUTO: 9.8 FL (ref 6–12)
POTASSIUM SERPL-SCNC: 3.9 MMOL/L (ref 3.5–5.2)
PROT SERPL-MCNC: 7.4 G/DL (ref 6–8.5)
RBC # BLD AUTO: 4.1 10*6/MM3 (ref 3.77–5.28)
SODIUM SERPL-SCNC: 134 MMOL/L (ref 136–145)
WBC NRBC COR # BLD: 8.26 10*3/MM3 (ref 3.4–10.8)

## 2022-10-03 PROCEDURE — 36415 COLL VENOUS BLD VENIPUNCTURE: CPT

## 2022-10-03 PROCEDURE — 70487 CT MAXILLOFACIAL W/DYE: CPT

## 2022-10-03 PROCEDURE — 83605 ASSAY OF LACTIC ACID: CPT | Performed by: EMERGENCY MEDICINE

## 2022-10-03 PROCEDURE — 70450 CT HEAD/BRAIN W/O DYE: CPT

## 2022-10-03 PROCEDURE — 85025 COMPLETE CBC W/AUTO DIFF WBC: CPT | Performed by: EMERGENCY MEDICINE

## 2022-10-03 PROCEDURE — 93010 ELECTROCARDIOGRAM REPORT: CPT | Performed by: INTERNAL MEDICINE

## 2022-10-03 PROCEDURE — 99284 EMERGENCY DEPT VISIT MOD MDM: CPT

## 2022-10-03 PROCEDURE — 93005 ELECTROCARDIOGRAM TRACING: CPT | Performed by: EMERGENCY MEDICINE

## 2022-10-03 PROCEDURE — 25010000002 IOPAMIDOL 61 % SOLUTION: Performed by: EMERGENCY MEDICINE

## 2022-10-03 PROCEDURE — 80053 COMPREHEN METABOLIC PANEL: CPT | Performed by: EMERGENCY MEDICINE

## 2022-10-03 PROCEDURE — 87040 BLOOD CULTURE FOR BACTERIA: CPT | Performed by: EMERGENCY MEDICINE

## 2022-10-03 PROCEDURE — 82565 ASSAY OF CREATININE: CPT

## 2022-10-03 RX ORDER — SODIUM CHLORIDE 0.9 % (FLUSH) 0.9 %
10 SYRINGE (ML) INJECTION AS NEEDED
Status: DISCONTINUED | OUTPATIENT
Start: 2022-10-03 | End: 2022-10-04 | Stop reason: HOSPADM

## 2022-10-03 RX ADMIN — IOPAMIDOL 100 ML: 612 INJECTION, SOLUTION INTRAVENOUS at 23:23

## 2022-10-04 VITALS
TEMPERATURE: 98 F | DIASTOLIC BLOOD PRESSURE: 78 MMHG | BODY MASS INDEX: 23.6 KG/M2 | OXYGEN SATURATION: 99 % | RESPIRATION RATE: 20 BRPM | SYSTOLIC BLOOD PRESSURE: 144 MMHG | HEART RATE: 88 BPM | WEIGHT: 125 LBS | HEIGHT: 61 IN

## 2022-10-04 RX ORDER — CEPHALEXIN 500 MG/1
500 CAPSULE ORAL ONCE
Status: COMPLETED | OUTPATIENT
Start: 2022-10-04 | End: 2022-10-04

## 2022-10-04 RX ORDER — CEPHALEXIN 500 MG/1
500 CAPSULE ORAL 3 TIMES DAILY
Qty: 21 CAPSULE | Refills: 0 | Status: SHIPPED | OUTPATIENT
Start: 2022-10-04

## 2022-10-04 RX ADMIN — CEPHALEXIN 500 MG: 500 CAPSULE ORAL at 01:11

## 2022-10-04 NOTE — ED PROVIDER NOTES
Subjective   History of Present Illness  Patient is brought to emergency room by ambulance with a report of altered mental status.  According to her  they were at home and she had been eating and when suddenly she just stopped and stared at them as if she did not know who he was.  She slumped over on the table and seemed to be a little weak but okay.  He was getting up to get dressed to bring her in to be checked out and says that she started to get up and walk away and then fell and hit the floor.  She laid on the floor for about 5 or 10 minutes and by the time EMS got there she was back to alert and talking again.  Now she is back to her normal self.  He says she had a similar episode about a week ago.  She refused to come in to get checked at that time.  She also has a swollen area on her left jaw where she has a known tooth abscess and has plans to get a root canal sometime this week    History provided by:  Patient and spouse   used: No    Altered Mental Status  Presenting symptoms: unresponsiveness    Severity:  Severe  Most recent episode:  Today  Episode history:  Single  Duration:  5 minutes  Timing:  Constant  Progression:  Resolved  Chronicity:  New  Context: not alcohol use, not dementia, not drug use, not head injury, not homeless, taking medications as prescribed, not nursing home resident, not recent change in medication, not recent illness and not recent infection    Associated symptoms: no abdominal pain, no bladder incontinence, no decreased appetite, no difficulty breathing, no eye deviation, no hallucinations, no light-headedness, no nausea, no rash, no suicidal behavior, no visual change and no weakness        Review of Systems   Constitutional: Negative.  Negative for decreased appetite.   HENT: Negative.    Respiratory: Negative.    Cardiovascular: Negative.    Gastrointestinal: Negative.  Negative for abdominal pain and nausea.   Genitourinary: Negative.  Negative  for bladder incontinence.   Musculoskeletal: Negative.    Skin: Negative.  Negative for rash.   Neurological: Negative.  Negative for weakness and light-headedness.   Hematological: Negative.    Psychiatric/Behavioral: Negative for hallucinations.   All other systems reviewed and are negative.      Past Medical History:   Diagnosis Date   • Anxiety    • Asthma    • Clotting disorder (HCC)    • Colon polyp    • Depression    • Esophageal stricture    • Fatty liver    • Fibromyalgia    • Fibromyalgia    • GERD (gastroesophageal reflux disease)    • Hyperlipidemia    • Hypertension    • IBS (irritable bowel syndrome)    • Insomnia    • Ischemic colitis (HCC)    • Leukocytosis    • Rectal bleeding    • Restless leg syndrome    • RLS (restless legs syndrome)        Allergies   Allergen Reactions   • Macrodantin [Nitrofurantoin] Nausea And Vomiting and Unknown - Low Severity   • Metronidazole Nausea And Vomiting and Unknown - Low Severity   • Tetracyclines & Related Nausea And Vomiting     unknown   • Azithromycin Rash   • Penicillins Rash   • Sulfa Antibiotics Rash       Past Surgical History:   Procedure Laterality Date   • APPENDECTOMY     • BREAST EXCISIONAL BIOPSY Left     Dr. Dahl   • CATARACT EXTRACTION     • CHOLECYSTECTOMY     • COLONOSCOPY  10/14/2011    adenomatous polyp   • ENDOSCOPY  2012    normal   • HERNIA REPAIR     • HIP SURGERY     • HYSTERECTOMY     • LIPOMA EXCISION     • TUMOR EXCISION         Family History   Problem Relation Age of Onset   • Breast cancer Maternal Cousin    • Colon cancer Neg Hx        Social History     Socioeconomic History   • Marital status:    Tobacco Use   • Smoking status: Former Smoker     Quit date: 2000     Years since quittin.7   • Smokeless tobacco: Never Used   Substance and Sexual Activity   • Alcohol use: No   • Drug use: No       Prior to Admission medications    Medication Sig Start Date End Date Taking? Authorizing Provider   acyclovir  (ZOVIRAX) 200 MG capsule Take  by mouth Every 4 (Four) Hours While Awake.   Yes Cosme Soria MD   Cholecalciferol (VITAMIN D3 PO) Take  by mouth.   Yes Cosme Soria MD   cholecalciferol (VITAMIN D3) 1000 units tablet Take 2,000 Units by mouth Daily.   Yes Cosme Soria MD   CLORAZEPATE DIPOTASSIUM PO Take  by mouth.   Yes Cosme Soria MD   doxepin (SINEquan) 25 MG capsule Take 25 mg by mouth Every Night.   Yes Cosme Soria MD   DULoxetine (CYMBALTA) 60 MG capsule Take 30 mg by mouth Daily.   Yes Cosme Soria MD   fenofibrate 160 MG tablet Every Night. 5/5/19  Yes Cosme Soria MD   Loratadine 10 MG capsule Take  by mouth.   Yes Cosme Soria MD   Multiple Vitamins-Minerals (CENTRUM SILVER PO) Take  by mouth Every Night.   Yes Cosme Soria MD   Naloxegol Oxalate (Movantik) 25 MG tablet Take 25 mg by mouth Every Morning.   Yes Cosme Soria MD   olmesartan (BENICAR) 40 MG tablet Take 40 mg by mouth Daily.   Yes Cosme Soria MD   oxyCODONE-acetaminophen (PERCOCET)  MG per tablet Take 1 tablet by mouth 2 (Two) Times a Day As Needed for Moderate Pain .   Yes Cosme Soria MD   Albuterol Sulfate (PROAIR HFA IN) Inhale.    Cosme Soria MD   Ascorbic Acid (VITAMIN C PO) Take  by mouth Daily.    Cosme Soria MD   Cyanocobalamin (VITAMIN B-12 PO) Take  by mouth Daily.    Cosme Soria MD   diphenoxylate-atropine (LOMOTIL) 2.5-0.025 MG per tablet Take 1 tablet by mouth 4 (Four) Times a Day As Needed for Diarrhea.    Cosme Soria MD   ezetimibe (ZETIA) 10 MG tablet Take 10 mg by mouth Daily.    Cosme Soria MD   fenofibrate 160 MG tablet Take 160 mg by mouth Daily.    Cosme Soria MD   fluticasone (FLONASE) 50 MCG/ACT nasal spray 2 sprays into the nostril(s) as directed by provider Daily.    Cosme Soria MD   gabapentin (NEURONTIN) 300 MG capsule Take 300 mg  by mouth Every Night.    Cosme Soria MD   lisinopril (PRINIVIL,ZESTRIL) 20 MG tablet Take 20 mg by mouth.    Cosme Soria MD   MELATONIN PO Take  by mouth Daily.    Cosme Soria MD   mupirocin (BACTROBAN) 2 % ointment Apply 1 application topically to the appropriate area as directed 3 (Three) Times a Day.    Cosme Soria MD   nystatin (MYCOSTATIN) 100,000 unit/mL suspension Swish and swallow 500,000 Units 4 (Four) Times a Day.    Cosme Soria MD   omeprazole (priLOSEC) 40 MG capsule Take 40 mg by mouth Daily.    Cosme Soria MD   ondansetron (ZOFRAN) 4 MG tablet Take 4 mg by mouth Every 8 (Eight) Hours As Needed for Nausea or Vomiting.    Cosme Soria MD   pantoprazole (PROTONIX) 40 MG EC tablet Take 40 mg by mouth Daily.    Cosme Soria MD   pravastatin (PRAVACHOL) 20 MG tablet Take 40 mg by mouth Every Night.    Cosme Soria MD   QUEtiapine XR (SEROquel XR) 200 MG 24 hr tablet Take 1 tablet by mouth Every Night. 11/22/20   Shannon Roberts APRN   RESTASIS 0.05 % ophthalmic emulsion  2/13/19   Cosme Soria MD   tiZANidine (ZANAFLEX) 4 MG tablet Take 4 mg by mouth At Night As Needed for Muscle Spasms.    Cosme Soria MD   traZODone (DESYREL) 50 MG tablet Take 50 mg by mouth Every Night.    Cosme Soria MD       Medications   sodium chloride 0.9 % flush 10 mL (has no administration in time range)   cephalexin (KEFLEX) capsule 500 mg (has no administration in time range)   iopamidol (ISOVUE-300) 61 % injection 100 mL (100 mL Intravenous Given 10/3/22 2323)       Vitals:    10/04/22 0048   BP: 133/78   Pulse: 76   Resp: 20   Temp:    SpO2: 99%         Objective   Physical Exam  Vitals and nursing note reviewed.   Constitutional:       Appearance: She is well-developed.   HENT:      Head: Normocephalic and atraumatic.      Mouth/Throat:      Comments: Patient does have an area of localized swelling along the  left mandibular area.  There are some warmth and tenderness to it.  There is no striations.  Mouth is otherwise open and clear with no signs of obstruction.  Eyes:      General: No visual field deficit.     Extraocular Movements: Extraocular movements intact.      Pupils: Pupils are equal, round, and reactive to light.   Cardiovascular:      Rate and Rhythm: Normal rate and regular rhythm.   Pulmonary:      Effort: Pulmonary effort is normal.      Breath sounds: Normal breath sounds.   Musculoskeletal:         General: Normal range of motion.      Cervical back: Normal range of motion and neck supple.   Skin:     General: Skin is warm and dry.   Neurological:      Mental Status: She is alert and oriented to person, place, and time. Mental status is at baseline.      Cranial Nerves: No cranial nerve deficit, dysarthria or facial asymmetry.   Psychiatric:         Mood and Affect: Mood normal.         Behavior: Behavior normal.         Procedures         Lab Results (last 24 hours)     Procedure Component Value Units Date/Time    CBC & Differential [[id number]]  (Abnormal) Collected: 10/03/22 2304    Specimen: Blood Updated: 10/03/22 2317    Narrative:      The following orders were created for panel order CBC & Differential.  Procedure                               Abnormality         Status                     ---------                               -----------         ------                     CBC Auto Differential[[id number]]        Abnormal            Final result                 Please view results for these tests on the individual orders.    Comprehensive Metabolic Panel [[id number]]  (Abnormal) Collected: 10/03/22 2304    Specimen: Blood Updated: 10/03/22 2338     Glucose 133 mg/dL      BUN 13 mg/dL      Creatinine 0.97 mg/dL      Sodium 134 mmol/L      Potassium 3.9 mmol/L      Chloride 95 mmol/L      CO2 30.0 mmol/L      Calcium 9.4 mg/dL      Total Protein 7.4 g/dL      Albumin 4.10 g/dL      ALT (SGPT) 19 U/L       AST (SGOT) 24 U/L      Alkaline Phosphatase 55 U/L      Total Bilirubin <0.2 mg/dL      Globulin 3.3 gm/dL      A/G Ratio 1.2 g/dL      BUN/Creatinine Ratio 13.4     Anion Gap 9.0 mmol/L      eGFR 63.0 mL/min/1.73      Comment: National Kidney Foundation and American Society of Nephrology (ASN) Task Force recommended calculation based on the Chronic Kidney Disease Epidemiology Collaboration (CKD-EPI) equation refit without adjustment for race.       Narrative:      GFR Normal >60  Chronic Kidney Disease <60  Kidney Failure <15      Blood Culture - Blood, Arm, Right [[id number]] Collected: 10/03/22 2304    Specimen: Blood from Arm, Right Updated: 10/03/22 2314    Blood Culture - Blood, Arm, Left [[id number]] Collected: 10/03/22 2304    Specimen: Blood from Arm, Left Updated: 10/03/22 2314    Lactic Acid, Plasma [[id number]]  (Normal) Collected: 10/03/22 2304    Specimen: Blood Updated: 10/03/22 2335     Lactate 1.4 mmol/L     CBC Auto Differential [[id number]]  (Abnormal) Collected: 10/03/22 2304    Specimen: Blood Updated: 10/03/22 2317     WBC 8.26 10*3/mm3      RBC 4.10 10*6/mm3      Hemoglobin 11.2 g/dL      Hematocrit 34.8 %      MCV 84.9 fL      MCH 27.3 pg      MCHC 32.2 g/dL      RDW 13.2 %      RDW-SD 40.9 fl      MPV 9.8 fL      Platelets 369 10*3/mm3      Neutrophil % 70.5 %      Lymphocyte % 12.2 %      Monocyte % 11.3 %      Eosinophil % 5.1 %      Basophil % 0.7 %      Immature Grans % 0.2 %      Neutrophils, Absolute 5.82 10*3/mm3      Lymphocytes, Absolute 1.01 10*3/mm3      Monocytes, Absolute 0.93 10*3/mm3      Eosinophils, Absolute 0.42 10*3/mm3      Basophils, Absolute 0.06 10*3/mm3      Immature Grans, Absolute 0.02 10*3/mm3      nRBC 0.0 /100 WBC     POC Creatinine [[id number]]  (Normal) Collected: 10/03/22 2317    Specimen: Blood Updated: 10/03/22 2334     Creatinine 1.00 mg/dL      Comment: Serial Number: [id number]:  [id number]             CT Head Without Contrast    (Results Pending)   CT  Facial Bones With Contrast    (Results Pending)       ED Course  ED Course as of 10/04/22 0106   Tue Oct 04, 2022   0105 Tell the patient her testing here is okay today.  Her CT head is negative and her lab work is all fine.  She does have an abscess in the tooth but she is known that and has plans to see a dentist later this week.  I told her that I am not sure what happened to her.  It could be a TIA but it really does not sound like the typical TIA in her description.  It could be some type of vasovagal episode.  At any rate she is stable now needs to follow-up with her family physician for further testing to try to figure out what is going on.  There is no signs of any instability at the present time.  I will order an antibiotic for the tooth.  She is discharged in stable condition peer [TR]      ED Course User Index  [TR] Fred Hedrick Jr., MD          MDM  Number of Diagnoses or Management Options  Abscessed tooth: new and requires workup  Syncope and collapse: new and requires workup     Amount and/or Complexity of Data Reviewed  Clinical lab tests: ordered and reviewed  Tests in the radiology section of CPT®: reviewed and ordered  Tests in the medicine section of CPT®: ordered and reviewed    Risk of Complications, Morbidity, and/or Mortality  Presenting problems: moderate  Diagnostic procedures: moderate  Management options: moderate    Patient Progress  Patient progress: stable      Final diagnoses:   Syncope and collapse   Abscessed tooth          Fred Hedrick Jr., MD  10/04/22 0106

## 2022-10-04 NOTE — ED NOTES
The following fall interventions were initiated:    [x] Patient and/or family given education   [x] Call light within reach and educated on how to use   [x] Bed rails up per protocol    [x] Bed locked and in the lowest position   [x] Bed alarm set and on loudest setting   [x] Fall wrist band applied   [x] Non skid footwear applied   [x] Room free of clutter   [x] Patient items within reach   [x] Adequate lighting provided  [x] Falls sign present    [] Patient moved closer to nursing station   [] Restraints applied

## 2022-10-05 LAB
QT INTERVAL: 368 MS
QTC INTERVAL: 419 MS

## 2022-10-06 ENCOUNTER — TRANSCRIBE ORDERS (OUTPATIENT)
Dept: ADMINISTRATIVE | Facility: HOSPITAL | Age: 72
End: 2022-10-06

## 2022-10-06 DIAGNOSIS — R55 SYNCOPE AND COLLAPSE: Primary | ICD-10-CM

## 2022-10-08 LAB
BACTERIA SPEC AEROBE CULT: NORMAL
BACTERIA SPEC AEROBE CULT: NORMAL

## 2022-10-20 ENCOUNTER — HOSPITAL ENCOUNTER (OUTPATIENT)
Dept: ULTRASOUND IMAGING | Facility: HOSPITAL | Age: 72
Discharge: HOME | End: 2022-10-20
Admitting: PHYSICIAN ASSISTANT

## 2022-10-20 DIAGNOSIS — R55 SYNCOPE AND COLLAPSE: ICD-10-CM

## 2022-10-20 PROCEDURE — 93880 EXTRACRANIAL BILAT STUDY: CPT

## 2022-10-23 ENCOUNTER — APPOINTMENT (OUTPATIENT)
Dept: GENERAL RADIOLOGY | Facility: HOSPITAL | Age: 72
End: 2022-10-23

## 2022-10-23 ENCOUNTER — HOSPITAL ENCOUNTER (EMERGENCY)
Facility: HOSPITAL | Age: 72
Discharge: PSYCHIATRIC OR SUBSTANCE USE FACILITY | End: 2022-10-24
Attending: STUDENT IN AN ORGANIZED HEALTH CARE EDUCATION/TRAINING PROGRAM | Admitting: STUDENT IN AN ORGANIZED HEALTH CARE EDUCATION/TRAINING PROGRAM

## 2022-10-23 ENCOUNTER — APPOINTMENT (OUTPATIENT)
Dept: CT IMAGING | Facility: HOSPITAL | Age: 72
End: 2022-10-23

## 2022-10-23 DIAGNOSIS — R41.82 ALTERED MENTAL STATUS, UNSPECIFIED ALTERED MENTAL STATUS TYPE: ICD-10-CM

## 2022-10-23 DIAGNOSIS — F23 ACUTE PSYCHOSIS: Primary | ICD-10-CM

## 2022-10-23 DIAGNOSIS — Z91.14 NONCOMPLIANCE WITH MEDICATIONS: ICD-10-CM

## 2022-10-23 DIAGNOSIS — R44.3 HALLUCINATIONS: ICD-10-CM

## 2022-10-23 PROBLEM — G93.40 ACUTE ENCEPHALOPATHY: Status: ACTIVE | Noted: 2022-10-23

## 2022-10-23 LAB
ALBUMIN SERPL-MCNC: 4.6 G/DL (ref 3.5–5.2)
ALBUMIN/GLOB SERPL: 1.5 G/DL
ALP SERPL-CCNC: 71 U/L (ref 39–117)
ALT SERPL W P-5'-P-CCNC: 15 U/L (ref 1–33)
AMPHET+METHAMPHET UR QL: NEGATIVE
AMPHETAMINES UR QL: NEGATIVE
ANION GAP SERPL CALCULATED.3IONS-SCNC: 12 MMOL/L (ref 5–15)
AST SERPL-CCNC: 24 U/L (ref 1–32)
BARBITURATES UR QL SCN: NEGATIVE
BASOPHILS # BLD AUTO: 0.06 10*3/MM3 (ref 0–0.2)
BASOPHILS NFR BLD AUTO: 0.7 % (ref 0–1.5)
BENZODIAZ UR QL SCN: POSITIVE
BILIRUB SERPL-MCNC: 0.4 MG/DL (ref 0–1.2)
BILIRUB UR QL STRIP: NEGATIVE
BUN SERPL-MCNC: 16 MG/DL (ref 8–23)
BUN/CREAT SERPL: 21.6 (ref 7–25)
BUPRENORPHINE SERPL-MCNC: NEGATIVE NG/ML
CALCIUM SPEC-SCNC: 10 MG/DL (ref 8.6–10.5)
CANNABINOIDS SERPL QL: NEGATIVE
CHLORIDE SERPL-SCNC: 98 MMOL/L (ref 98–107)
CLARITY UR: CLEAR
CO2 SERPL-SCNC: 26 MMOL/L (ref 22–29)
COCAINE UR QL: NEGATIVE
COLOR UR: YELLOW
CREAT SERPL-MCNC: 0.74 MG/DL (ref 0.57–1)
D-LACTATE SERPL-SCNC: 1 MMOL/L (ref 0.5–2)
DEPRECATED RDW RBC AUTO: 42.6 FL (ref 37–54)
EGFRCR SERPLBLD CKD-EPI 2021: 87.2 ML/MIN/1.73
EOSINOPHIL # BLD AUTO: 0.01 10*3/MM3 (ref 0–0.4)
EOSINOPHIL NFR BLD AUTO: 0.1 % (ref 0.3–6.2)
ERYTHROCYTE [DISTWIDTH] IN BLOOD BY AUTOMATED COUNT: 13.9 % (ref 12.3–15.4)
GLOBULIN UR ELPH-MCNC: 3.1 GM/DL
GLUCOSE SERPL-MCNC: 107 MG/DL (ref 65–99)
GLUCOSE UR STRIP-MCNC: NEGATIVE MG/DL
HCT VFR BLD AUTO: 38.5 % (ref 34–46.6)
HGB BLD-MCNC: 12.4 G/DL (ref 12–15.9)
HGB UR QL STRIP.AUTO: NEGATIVE
IMM GRANULOCYTES # BLD AUTO: 0.02 10*3/MM3 (ref 0–0.05)
IMM GRANULOCYTES NFR BLD AUTO: 0.2 % (ref 0–0.5)
KETONES UR QL STRIP: NEGATIVE
LEUKOCYTE ESTERASE UR QL STRIP.AUTO: NEGATIVE
LIPASE SERPL-CCNC: 56 U/L (ref 13–60)
LYMPHOCYTES # BLD AUTO: 1.83 10*3/MM3 (ref 0.7–3.1)
LYMPHOCYTES NFR BLD AUTO: 20.9 % (ref 19.6–45.3)
MCH RBC QN AUTO: 27.1 PG (ref 26.6–33)
MCHC RBC AUTO-ENTMCNC: 32.2 G/DL (ref 31.5–35.7)
MCV RBC AUTO: 84.2 FL (ref 79–97)
METHADONE UR QL SCN: NEGATIVE
MONOCYTES # BLD AUTO: 0.8 10*3/MM3 (ref 0.1–0.9)
MONOCYTES NFR BLD AUTO: 9.2 % (ref 5–12)
NEUTROPHILS NFR BLD AUTO: 6.02 10*3/MM3 (ref 1.7–7)
NEUTROPHILS NFR BLD AUTO: 68.9 % (ref 42.7–76)
NITRITE UR QL STRIP: NEGATIVE
NRBC BLD AUTO-RTO: 0 /100 WBC (ref 0–0.2)
NT-PROBNP SERPL-MCNC: 72.3 PG/ML (ref 0–900)
OPIATES UR QL: NEGATIVE
OXYCODONE UR QL SCN: NEGATIVE
PCP UR QL SCN: NEGATIVE
PH UR STRIP.AUTO: 5.5 [PH] (ref 5–8)
PLATELET # BLD AUTO: 422 10*3/MM3 (ref 140–450)
PMV BLD AUTO: 9.9 FL (ref 6–12)
POTASSIUM SERPL-SCNC: 3.9 MMOL/L (ref 3.5–5.2)
PROPOXYPH UR QL: NEGATIVE
PROT SERPL-MCNC: 7.7 G/DL (ref 6–8.5)
PROT UR QL STRIP: NEGATIVE
RBC # BLD AUTO: 4.57 10*6/MM3 (ref 3.77–5.28)
SODIUM SERPL-SCNC: 136 MMOL/L (ref 136–145)
SP GR UR STRIP: 1.01 (ref 1–1.03)
TRICYCLICS UR QL SCN: NEGATIVE
TROPONIN T SERPL-MCNC: <0.01 NG/ML (ref 0–0.03)
UROBILINOGEN UR QL STRIP: NORMAL
WBC NRBC COR # BLD: 8.74 10*3/MM3 (ref 3.4–10.8)

## 2022-10-23 PROCEDURE — 80179 DRUG ASSAY SALICYLATE: CPT | Performed by: STUDENT IN AN ORGANIZED HEALTH CARE EDUCATION/TRAINING PROGRAM

## 2022-10-23 PROCEDURE — 82077 ASSAY SPEC XCP UR&BREATH IA: CPT | Performed by: STUDENT IN AN ORGANIZED HEALTH CARE EDUCATION/TRAINING PROGRAM

## 2022-10-23 PROCEDURE — 83880 ASSAY OF NATRIURETIC PEPTIDE: CPT | Performed by: STUDENT IN AN ORGANIZED HEALTH CARE EDUCATION/TRAINING PROGRAM

## 2022-10-23 PROCEDURE — 85025 COMPLETE CBC W/AUTO DIFF WBC: CPT | Performed by: STUDENT IN AN ORGANIZED HEALTH CARE EDUCATION/TRAINING PROGRAM

## 2022-10-23 PROCEDURE — 71045 X-RAY EXAM CHEST 1 VIEW: CPT

## 2022-10-23 PROCEDURE — 81003 URINALYSIS AUTO W/O SCOPE: CPT | Performed by: STUDENT IN AN ORGANIZED HEALTH CARE EDUCATION/TRAINING PROGRAM

## 2022-10-23 PROCEDURE — 93010 ELECTROCARDIOGRAM REPORT: CPT | Performed by: INTERNAL MEDICINE

## 2022-10-23 PROCEDURE — P9612 CATHETERIZE FOR URINE SPEC: HCPCS

## 2022-10-23 PROCEDURE — 87040 BLOOD CULTURE FOR BACTERIA: CPT | Performed by: STUDENT IN AN ORGANIZED HEALTH CARE EDUCATION/TRAINING PROGRAM

## 2022-10-23 PROCEDURE — 36415 COLL VENOUS BLD VENIPUNCTURE: CPT

## 2022-10-23 PROCEDURE — 83605 ASSAY OF LACTIC ACID: CPT | Performed by: STUDENT IN AN ORGANIZED HEALTH CARE EDUCATION/TRAINING PROGRAM

## 2022-10-23 PROCEDURE — 70450 CT HEAD/BRAIN W/O DYE: CPT

## 2022-10-23 PROCEDURE — 80306 DRUG TEST PRSMV INSTRMNT: CPT | Performed by: STUDENT IN AN ORGANIZED HEALTH CARE EDUCATION/TRAINING PROGRAM

## 2022-10-23 PROCEDURE — 80143 DRUG ASSAY ACETAMINOPHEN: CPT | Performed by: STUDENT IN AN ORGANIZED HEALTH CARE EDUCATION/TRAINING PROGRAM

## 2022-10-23 PROCEDURE — 84443 ASSAY THYROID STIM HORMONE: CPT | Performed by: INTERNAL MEDICINE

## 2022-10-23 PROCEDURE — 93005 ELECTROCARDIOGRAM TRACING: CPT | Performed by: STUDENT IN AN ORGANIZED HEALTH CARE EDUCATION/TRAINING PROGRAM

## 2022-10-23 PROCEDURE — 99284 EMERGENCY DEPT VISIT MOD MDM: CPT

## 2022-10-23 PROCEDURE — 83690 ASSAY OF LIPASE: CPT | Performed by: STUDENT IN AN ORGANIZED HEALTH CARE EDUCATION/TRAINING PROGRAM

## 2022-10-23 PROCEDURE — 80053 COMPREHEN METABOLIC PANEL: CPT | Performed by: STUDENT IN AN ORGANIZED HEALTH CARE EDUCATION/TRAINING PROGRAM

## 2022-10-23 PROCEDURE — 84484 ASSAY OF TROPONIN QUANT: CPT | Performed by: STUDENT IN AN ORGANIZED HEALTH CARE EDUCATION/TRAINING PROGRAM

## 2022-10-23 RX ORDER — SODIUM CHLORIDE 0.9 % (FLUSH) 0.9 %
10 SYRINGE (ML) INJECTION AS NEEDED
Status: DISCONTINUED | OUTPATIENT
Start: 2022-10-23 | End: 2022-10-24 | Stop reason: HOSPADM

## 2022-10-23 RX ORDER — SODIUM CHLORIDE 0.9 % (FLUSH) 0.9 %
10 SYRINGE (ML) INJECTION EVERY 12 HOURS SCHEDULED
Status: DISCONTINUED | OUTPATIENT
Start: 2022-10-23 | End: 2022-10-24 | Stop reason: HOSPADM

## 2022-10-23 RX ORDER — ACETAMINOPHEN 160 MG/5ML
650 SOLUTION ORAL EVERY 4 HOURS PRN
Status: DISCONTINUED | OUTPATIENT
Start: 2022-10-23 | End: 2022-10-24 | Stop reason: HOSPADM

## 2022-10-23 RX ORDER — ONDANSETRON 2 MG/ML
4 INJECTION INTRAMUSCULAR; INTRAVENOUS EVERY 6 HOURS PRN
Status: DISCONTINUED | OUTPATIENT
Start: 2022-10-23 | End: 2022-10-24 | Stop reason: HOSPADM

## 2022-10-23 RX ORDER — SODIUM CHLORIDE 9 MG/ML
75 INJECTION, SOLUTION INTRAVENOUS CONTINUOUS
Status: DISCONTINUED | OUTPATIENT
Start: 2022-10-23 | End: 2022-10-24 | Stop reason: HOSPADM

## 2022-10-23 RX ORDER — ACETAMINOPHEN 325 MG/1
650 TABLET ORAL EVERY 4 HOURS PRN
Status: DISCONTINUED | OUTPATIENT
Start: 2022-10-23 | End: 2022-10-24 | Stop reason: HOSPADM

## 2022-10-23 RX ORDER — OLANZAPINE 5 MG/1
2.5 TABLET, ORALLY DISINTEGRATING ORAL ONCE
Status: COMPLETED | OUTPATIENT
Start: 2022-10-23 | End: 2022-10-23

## 2022-10-23 RX ADMIN — OLANZAPINE 2.5 MG: 5 TABLET, ORALLY DISINTEGRATING ORAL at 23:02

## 2022-10-23 NOTE — ED NOTES
Pt  now at bedside.  Pt  confirms pt c/o that EMS reported to [facility].  Pt unsure of pt medical hx.

## 2022-10-23 NOTE — ED PROVIDER NOTES
"EMERGENCY DEPARTMENT HISTORY AND PHYSICAL EXAM    Patient Name: Sofi Bridges    Chief Complaint   Patient presents with   • Altered Mental Status       History of Presenting Illness:  Sofi Bridges is a 72 y.o. female with a history of hypertension hyperlipidemia presents emergency department due to altered mental status.    Unable to obtain additional history due to patient's altered mental status.    History is obtained from patient's  at the bedside.   is quite concerned that patient is developing dementia.  However, he states this has only been occurring over the last 3 weeks she has had progressive decline has been talking to herself has been more confused not knowing what day it is.  Reports she has had some falls.  Still taking all of her medications per his report.    Past Medical History:   Unable to obtain due to patient's altered mental status. Per prior chart:  Past Medical History:   Diagnosis Date   • Asthma    • Fibromyalgia    • Hyperlipidemia    • Hypertension        Past Surgical History:  Unable to obtain due to patient's altered mental status. Per prior chart:  Past Surgical History:   Procedure Laterality Date   • COLONOSCOPY         Social History:   Unable to obtain due to patient's altered mental status.     Allergies:   Unable to obtain due to patient's altered mental status.    Medications:   Unable to obtain due to patient's altered mental status.    Review of Systems:  Unable to obtain due to patient's altered mental status.    Physical Exam:   VS: /89   Pulse 91   Temp 98.6 °F (37 °C) (Oral)   Resp 18   Ht 157.5 cm (62\")   Wt 63 kg (139 lb)   SpO2 98%   BMI 25.42 kg/m²   GENERAL: Well-appearing early woman sitting up in stretcher appearing to respond to internal stimuli speaking to herself when I walk in the room; otherwise well nourished, well developed, awake, alert, no acute distress, nontoxic appearing, comfortable  EYES: PERRL, sclera anicteric, extra-occular " movements grossly intact, symmetric lids  EARS, NOSE, MOUTH, THROAT: atraumatic external nose and ears, moist mucous membranes  NECK: Symmetric, trachea midline, no thyromegaly, no adenopathy, no meningismus  RESPIRATORY: Unlabored respiratory effort, clear to auscultation bilaterally, good air movement  CARDIOVASCULAR: No murmurs or gallops, peripheral pulses 2+ and equal in all extremities  GI: Soft, nontender, nondistended, bowel sounds present, no hepatosplenomegaly  LYMPHATIC: no lymphadenopathy  MUSCULOSKELETAL/EXTREMITIES: Extremities without obvious deformity, no cyanosis or clubbing  SKIN: warm and dry with no obvious rashes  NEUROLOGIC: moving all 4 extremities symmetrically, CN II-XII grossly intact; GCS 14 for confusion able to follow my commands states her name correctly as well as her birthday correctly but states current year is 2023; palpable strength about her wrist bilateral ankles; equal sensation of the distal hands and distal feet bilaterally  PSYCHIATRIC: alert, pleasant and cooperative. Appropriate mood and affect.      Labs:   Labs Reviewed   COMPREHENSIVE METABOLIC PANEL - Abnormal; Notable for the following components:       Result Value    Glucose 107 (*)     All other components within normal limits    Narrative:     GFR Normal >60  Chronic Kidney Disease <60  Kidney Failure <15     CBC WITH AUTO DIFFERENTIAL - Abnormal; Notable for the following components:    Eosinophil % 0.1 (*)     All other components within normal limits   URINE DRUG SCREEN - Abnormal; Notable for the following components:    Benzodiazepine Screen, Urine Positive (*)     All other components within normal limits    Narrative:     Cutoff For Drugs Screened:    Amphetamines               500 ng/ml  Barbiturates               200 ng/ml  Benzodiazepines            150 ng/ml  Cocaine                    150 ng/ml  Methadone                  200 ng/ml  Opiates                    100 ng/ml  Phencyclidine               25  ng/ml  THC                            50 ng/ml  Methamphetamine            500 ng/ml  Tricyclic Antidepressants  300 ng/ml  Oxycodone                  100 ng/ml  Propoxyphene               300 ng/ml  Buprenorphine               10 ng/ml    The normal value for all drugs tested is negative. This report includes unconfirmed screening results, with the cutoff values listed, to be used for medical treatment purposes only.  Unconfirmed results must not be used for non-medical purposes such as employment or legal testing.  Clinical consideration should be applied to any drug of abuse test, particularly when unconfirmed results are used.     COVID-19,CEPHEID/TARYN,COR/NADEEN/PAD/LUCIO/MAD IN-HOUSE,NP SWAB IN TRANSPORT MEDIA 3-4 HR TAT, RT-PCR - Normal    Narrative:     Fact sheet for providers: https://www.fda.gov/media/418933/download     Fact sheet for patients: https://www.fda.gov/media/550750/download  Fact sheet for providers: https://www.fda.gov/media/196038/download     Fact sheet for patients: https://www.fda.gov/media/509183/download   LIPASE - Normal   URINALYSIS W/ CULTURE IF INDICATED - Normal    Narrative:     In absence of clinical symptoms, the presence of pyuria, bacteria, and/or nitrites on the urinalysis result does not correlate with infection.  Urine microscopic not indicated.   TROPONIN (IN-HOUSE) - Normal    Narrative:     Troponin T Reference Range:  <= 0.03 ng/mL-   Negative for AMI  >0.03 ng/mL-     Abnormal for myocardial necrosis.  Clinicians would have to utilize clinical acumen, EKG, Troponin and serial changes to determine if it is an Acute Myocardial Infarction or myocardial injury due to an underlying chronic condition.       Results may be falsely decreased if patient taking Biotin.     BNP (IN-HOUSE) - Normal    Narrative:     Among patients with dyspnea, NT-proBNP is highly sensitive for the detection of acute congestive heart failure. In addition NT-proBNP of <300 pg/ml effectively rules out  acute congestive heart failure with 99% negative predictive value.     LACTIC ACID, PLASMA - Normal   TSH - Normal   AMMONIA - Normal   ACETAMINOPHEN LEVEL - Normal   SALICYLATE LEVEL - Normal   COVID PRE-OP / PRE-PROCEDURE SCREENING ORDER (NO ISOLATION)    Narrative:     The following orders were created for panel order COVID PRE-OP / PRE-PROCEDURE SCREENING ORDER (NO ISOLATION) - Swab, Nasopharynx.  Procedure                               Abnormality         Status                     ---------                               -----------         ------                     COVID-19,CEPHEID/TARYN,CO...[[id number]]  Normal              Final result                 Please view results for these tests on the individual orders.   BLOOD CULTURE   BLOOD CULTURE   ETHANOL    Narrative:     Not for legal purposes. Chain of Custody not followed.    FOLATE   T PALLIDUM AB (FTA-AB)   VITAMIN B12   CBC AND DIFFERENTIAL    Narrative:     The following orders were created for panel order CBC & Differential.  Procedure                               Abnormality         Status                     ---------                               -----------         ------                     CBC Auto Differential[[id number]]        Abnormal            Final result                 Please view results for these tests on the individual orders.         Radiology:   CT Head Without Contrast   Final Result       No acute intracranial findings.   This report was finalized on 10/23/2022 20:08 by Dr. Alec Colón MD.      XR Chest 1 View   Final Result       No acute findings   This report was finalized on 10/23/2022 19:28 by Dr. Alec Colón MD.          Medical Decision Making:  Sofi Bridges is a 72 y.o. female who presents emergency department brought by  with 3 weeks of progressively worsening confusion.    Reassuring vital signs arrival.  Patient GCS 14 for confusion appearing to be responding to internal stimuli.    I have reviewed and  interpreted the EKG and it shows: NSR, rate of 92. Normal axis and intervals. No signs of acute ischemia such as ST elevation, ST depression, or T wave inversion. No signs of Hyperkalemia, WPW, PE, Pericarditis, LV aneurysm, Brugada, Heart Block, Atrial fibrillation or A flutter.     Labs were ordered and reviewed.  Urine drug screen positive for benzodiazepines.  Otherwise negative COVID testing.  Negative Tylenol level.  Normal lactate.  Normal BNP.  Normal TSH.  CMP is unremarkable.  Normal blood cell count hemoglobin.    Imaging was ordered and reviewed.  CT head with no acute findings.  Chest x-ray with no acute findings.    Nursing notes were reviewed.    The patient was given oral Zyprexa for active hallucinations.    Case discussed with the inpatient hospitalist Dr. Nj.  Following admission Dr. Nj found another chart in the system for Shannon JOSEPH who is in fact this patient with the same birthday. Following his chart review it turns out patient has a long history of prior psychosis is on psychiatric medications.  Following his discussion of this with the patient at the bedside  did admit that patient has not been taking all of her medications  realizes.  He was afraid to give patient all of her psychiatric medications back at once given she had been off them for a while which is why he had really presented to the emergency department. Given this finding, psychiatric consult was immediately placed for acute psychosis likely due to medication noncompliance.    [facility] evaluation, [place], recommending transfer for psychiatric admission.  Planning transfer to [facility].    Patient's presentation is most consistent with acute on chronic psychosis likely due to medication noncompliance given patient's  has been holding her medications.  No infectious symptoms concerning for meningitis given her additional prior history I feel this is likely acute on chronic psychosis.  Patient  with clear response to internal stimuli and hallucinations actively.  No other signs of infection such as urinary tract infection pneumonia that would explain her symptoms.  Her electrolytes are otherwise reassuring.  Benzodiazepines may be contributing to her symptoms given patient's age.    Patient was transferred to [facility] for further psychiatric management.      ED Diagnosis:  Altered mental status, unspecified altered mental status type; Hallucinations; Acute psychosis (HCC); Noncompliance with medications      Disposition: transfer to [facility]        Signed:  Rikki Crawford MD  Emergency Medicine Physician    Please note that portions of this note were completed with a voice recognition program.      Rikki Crawford MD  10/24/22 0513

## 2022-10-24 VITALS
SYSTOLIC BLOOD PRESSURE: 169 MMHG | OXYGEN SATURATION: 97 % | HEART RATE: 96 BPM | HEIGHT: 62 IN | WEIGHT: 139 LBS | RESPIRATION RATE: 18 BRPM | TEMPERATURE: 98.2 F | DIASTOLIC BLOOD PRESSURE: 90 MMHG | BODY MASS INDEX: 25.58 KG/M2

## 2022-10-24 PROBLEM — R45.850 HOMICIDAL IDEATION: Status: ACTIVE | Noted: 2022-10-24

## 2022-10-24 PROBLEM — F22 PARANOID DELUSION (HCC): Status: ACTIVE | Noted: 2022-10-23

## 2022-10-24 LAB
AMMONIA BLD-SCNC: 26 UMOL/L (ref 11–51)
APAP SERPL-MCNC: <5 MCG/ML (ref 0–30)
ETHANOL UR QL: <0.01 %
FOLATE SERPL-MCNC: 15.3 NG/ML (ref 4.78–24.2)
SALICYLATES SERPL-MCNC: <0.3 MG/DL
SARS-COV-2 RNA RESP QL NAA+PROBE: NOT DETECTED
TSH SERPL DL<=0.05 MIU/L-ACNC: 1.29 UIU/ML (ref 0.27–4.2)
VIT B12 BLD-MCNC: 1223 PG/ML (ref 211–946)

## 2022-10-24 PROCEDURE — 82607 VITAMIN B-12: CPT | Performed by: INTERNAL MEDICINE

## 2022-10-24 PROCEDURE — 82140 ASSAY OF AMMONIA: CPT | Performed by: INTERNAL MEDICINE

## 2022-10-24 PROCEDURE — U0003 INFECTIOUS AGENT DETECTION BY NUCLEIC ACID (DNA OR RNA); SEVERE ACUTE RESPIRATORY SYNDROME CORONAVIRUS 2 (SARS-COV-2) (CORONAVIRUS DISEASE [COVID-19]), AMPLIFIED PROBE TECHNIQUE, MAKING USE OF HIGH THROUGHPUT TECHNOLOGIES AS DESCRIBED BY CMS-2020-01-R: HCPCS | Performed by: STUDENT IN AN ORGANIZED HEALTH CARE EDUCATION/TRAINING PROGRAM

## 2022-10-24 PROCEDURE — G0378 HOSPITAL OBSERVATION PER HR: HCPCS

## 2022-10-24 PROCEDURE — 82746 ASSAY OF FOLIC ACID SERUM: CPT | Performed by: INTERNAL MEDICINE

## 2022-10-24 PROCEDURE — 86780 TREPONEMA PALLIDUM: CPT | Performed by: INTERNAL MEDICINE

## 2022-10-24 NOTE — ED NOTES
Spoke with [facility] at this time for pt transport, will call back with update    son

## 2022-10-24 NOTE — CONSULTS
"      [facility] Medicine Consult  Consults    Date of Admission: 10/23/2022  Date of Consult: 10/24/22    Primary Care Physician: Kisha Byrne DO  Referring Physician: Dr. Rikki Crawford in the ER.   Chief Complaint/Reason for Consultation: Altered mental status.    Subjective   History of Present Illness  This is a 72-year-old  female patient who lives in [place], [place].  She sees Dr. Kisha Byrne for primary care.  The history provided in this consultation note is obtained from her  and her alternate medical record; [id number].  The patient wanted me to obtain an empty water bottle out of the trash can and give it back to her.  When I told her that I would \"get her a new one\" she got upset with me and pulled the covers over her head and would not look at me any longer.    Her  brought her in tonight because she has not been herself for the last 2-3 weeks. He actually brought her into the emergency department on 10/3 and was seen by Dr. Hedrick.  Work-up was benign and she was discharged with recommendations to follow-up with her primary care provider.  Her  states that he ended up taking her to see GEORGES Thompson and Dr. Byrne's office.  He states that he is supposed to take her back on Tuesday.    He states that she has always had problems with insomnia.  Apparently, these issues have been worse recently.  He states that she for a long time has \"stayed up all night on Facebook and playing games on her devices.\"  However, she would always sleep during the day and has now largely stopped doing that.  He started to get worried about some of her medications including clorazepate/Seroquel/Cymbalta.  He states that over the last week he has taken complete control of her medications and has not been giving her everything that she is prescribed in an attempt to see if she would get better.    He states that she has been telling him that " "she has seen \"angels and the devil.\"  Apparently, they argued about her medications earlier this evening and she made overtures that she would cause him harm as well as well as their neighbors.  She even made overtures that she might \"kill them.\"    Her  tells me that they have been  for approximately 27 years.  He believes that she \"had a mental breakdown when she was  to her first .\"  He was told about this somewhat by his sister-in-law.  He cannot say for sure if she required admission to the hospital at that time.    He does not know why she takes some of the medications that she takes.    Further chart review shows that she was admitted November 2020 and apparently had a \"unintentional overdose\" at that time.  Apparently, she took twice as much of her medication one day than she was supposed to have.    I was initially asked to place her in the hospital for further work-up.  However, at this time I do not believe that is in her best interest.  I think that she needs to be evaluated by psychiatry.  No immediate metabolic or structural issues have been identified on work-up in the emergency department.  This information was relayed to Dr. Crawford and he is placing her on a hold and will have psychiatry evaluate her as well.    Review of Systems   Difficult given the patient's psychiatric overtones and her unwillingness to speak with me.    Past Medical History:   Past Medical History:   Diagnosis Date   • Asthma    • Fibromyalgia    • Hyperlipidemia    • Hypertension      Past Surgical History:  Past Surgical History:   Procedure Laterality Date   • COLONOSCOPY       Social History: She lives with her  in [place].  They have been  for 27 years.  She worked at a  at the [employer] up until about 12 years ago when she retired.  She quit smoking cigarettes greater than 20 years ago.  She does not drink alcohol or use illicit drugs according to her .    Family " History: Family history includes Breast cancer in her maternal cousin.    Allergies:       Medications:       I have utilized all available immediate resources to obtain, update, and review the patient's current medications.    Objective   Objective    Physical Exam:   Temp:  [98.6 °F (37 °C)] 98.6 °F (37 °C)  Heart Rate:  [92-95] 95  Resp:  [18] 18  BP: (122-150)/(87-94) 135/92  Physical Exam  Constitutional:       Comments: Did not really get a chance to interview the patient very well.  She was upset with me that I would not get an empty water bottle out of the trash can when I came in the room.  After I told her that we would get her a new one, she shut down and would look towards the other side of the room or even cover her head up with the blanket.  I had a long discussion with her  who is at the bedside.   HENT:      Head: Normocephalic and atraumatic.   Eyes:      Conjunctiva/sclera: Conjunctivae normal.      Pupils: Pupils are equal, round, and reactive to light.   Neck:      Vascular: No JVD.   Cardiovascular:      Rate and Rhythm: Normal rate and regular rhythm.      Comments: NSR.  Pulmonary:      Effort: Pulmonary effort is normal.      Comments: On room air.  Musculoskeletal:         General: No swelling.      Cervical back: Neck supple.   Skin:     General: Skin is warm and dry.      Findings: No rash.   Neurological:      Mental Status: She is alert.      Cranial Nerves: No cranial nerve deficit.      Motor: No abnormal muscle tone.   Psychiatric:      Comments: Bizarre affect, seemed very annoyed with our brief interaction.       Results Reviewed:  I have personally reviewed current lab, radiology, and data and agree with results.  Lab Results (last 24 hours)     Procedure Component Value Units Date/Time    TSH [[id number]] Collected: 10/23/22 1906    Specimen: Blood Updated: 10/23/22 2355    Urine Drug Screen - Urine, Clean Catch [[id number]]  (Abnormal) Collected: 10/23/22 2005    Specimen:  Urine, Clean Catch Updated: 10/23/22 2030     THC, Screen, Urine Negative     Phencyclidine (PCP), Urine Negative     Cocaine Screen, Urine Negative     Methamphetamine, Ur Negative     Opiate Screen Negative     Amphetamine Screen, Urine Negative     Benzodiazepine Screen, Urine Positive     Tricyclic Antidepressants Screen Negative     Methadone Screen, Urine Negative     Barbiturates Screen, Urine Negative     Oxycodone Screen, Urine Negative     Propoxyphene Screen Negative     Buprenorphine, Screen, Urine Negative    Narrative:      Cutoff For Drugs Screened:    Amphetamines               500 ng/ml  Barbiturates               200 ng/ml  Benzodiazepines            150 ng/ml  Cocaine                    150 ng/ml  Methadone                  200 ng/ml  Opiates                    100 ng/ml  Phencyclidine               25 ng/ml  THC                            50 ng/ml  Methamphetamine            500 ng/ml  Tricyclic Antidepressants  300 ng/ml  Oxycodone                  100 ng/ml  Propoxyphene               300 ng/ml  Buprenorphine               10 ng/ml    The normal value for all drugs tested is negative. This report includes unconfirmed screening results, with the cutoff values listed, to be used for medical treatment purposes only.  Unconfirmed results must not be used for non-medical purposes such as employment or legal testing.  Clinical consideration should be applied to any drug of abuse test, particularly when unconfirmed results are used.      Comprehensive Metabolic Panel [[id number]]  (Abnormal) Collected: 10/23/22 1906    Specimen: Blood Updated: 10/23/22 1937     Glucose 107 mg/dL      BUN 16 mg/dL      Creatinine 0.74 mg/dL      Sodium 136 mmol/L      Potassium 3.9 mmol/L      Comment: Slight hemolysis detected by analyzer. Results may be affected.        Chloride 98 mmol/L      CO2 26.0 mmol/L      Calcium 10.0 mg/dL      Total Protein 7.7 g/dL      Albumin 4.60 g/dL      ALT (SGPT) 15 U/L      AST  (SGOT) 24 U/L      Alkaline Phosphatase 71 U/L      Total Bilirubin 0.4 mg/dL      Globulin 3.1 gm/dL      A/G Ratio 1.5 g/dL      BUN/Creatinine Ratio 21.6     Anion Gap 12.0 mmol/L      eGFR 87.2 mL/min/1.73      Comment: National Kidney Foundation and American Society of Nephrology (ASN) Task Force recommended calculation based on the Chronic Kidney Disease Epidemiology Collaboration (CKD-EPI) equation refit without adjustment for race.       Narrative:      GFR Normal >60  Chronic Kidney Disease <60  Kidney Failure <15      BNP [[id number]]  (Normal) Collected: 10/23/22 1906    Specimen: Blood Updated: 10/23/22 1935     proBNP 72.3 pg/mL     Narrative:      Among patients with dyspnea, NT-proBNP is highly sensitive for the detection of acute congestive heart failure. In addition NT-proBNP of <300 pg/ml effectively rules out acute congestive heart failure with 99% negative predictive value.      Lactic Acid, Plasma [[id number]]  (Normal) Collected: 10/23/22 1906    Specimen: Blood Updated: 10/23/22 1935     Lactate 1.0 mmol/L     Troponin [[id number]]  (Normal) Collected: 10/23/22 1906    Specimen: Blood Updated: 10/23/22 1934     Troponin T <0.010 ng/mL     Narrative:      Troponin T Reference Range:  <= 0.03 ng/mL-   Negative for AMI  >0.03 ng/mL-     Abnormal for myocardial necrosis.  Clinicians would have to utilize clinical acumen, EKG, Troponin and serial changes to determine if it is an Acute Myocardial Infarction or myocardial injury due to an underlying chronic condition.       Results may be falsely decreased if patient taking Biotin.      Lipase [[id number]]  (Normal) Collected: 10/23/22 1906    Specimen: Blood Updated: 10/23/22 1932     Lipase 56 U/L     Urinalysis With Culture If Indicated - Urine, Catheter [[id number]]  (Normal) Collected: 10/23/22 1920    Specimen: Urine, Catheter Updated: 10/23/22 1928     Color, UA Yellow     Appearance, UA Clear     pH, UA 5.5     Specific [place], UA 1.007      Glucose, UA Negative     Ketones, UA Negative     Bilirubin, UA Negative     Blood, UA Negative     Protein, UA Negative     Leuk Esterase, UA Negative     Nitrite, UA Negative     Urobilinogen, UA 0.2 E.U./dL    Narrative:      In absence of clinical symptoms, the presence of pyuria, bacteria, and/or nitrites on the urinalysis result does not correlate with infection.  Urine microscopic not indicated.    Blood Culture - Blood, Arm, Left [[id number]] Collected: 10/23/22 1906    Specimen: Blood from Arm, Left Updated: 10/23/22 1923    Blood Culture - Blood, Arm, Right [[id number]] Collected: 10/23/22 1906    Specimen: Blood from Arm, Right Updated: 10/23/22 1923    CBC & Differential [[id number]]  (Abnormal) Collected: 10/23/22 1906    Specimen: Blood Updated: 10/23/22 1917    Narrative:      The following orders were created for panel order CBC & Differential.  Procedure                               Abnormality         Status                     ---------                               -----------         ------                     CBC Auto Differential[[id number]]        Abnormal            Final result                 Please view results for these tests on the individual orders.    CBC Auto Differential [[id number]]  (Abnormal) Collected: 10/23/22 1906    Specimen: Blood Updated: 10/23/22 1917     WBC 8.74 10*3/mm3      RBC 4.57 10*6/mm3      Hemoglobin 12.4 g/dL      Hematocrit 38.5 %      MCV 84.2 fL      MCH 27.1 pg      MCHC 32.2 g/dL      RDW 13.9 %      RDW-SD 42.6 fl      MPV 9.9 fL      Platelets 422 10*3/mm3      Neutrophil % 68.9 %      Lymphocyte % 20.9 %      Monocyte % 9.2 %      Eosinophil % 0.1 %      Basophil % 0.7 %      Immature Grans % 0.2 %      Neutrophils, Absolute 6.02 10*3/mm3      Lymphocytes, Absolute 1.83 10*3/mm3      Monocytes, Absolute 0.80 10*3/mm3      Eosinophils, Absolute 0.01 10*3/mm3      Basophils, Absolute 0.06 10*3/mm3      Immature Grans, Absolute 0.02 10*3/mm3      nRBC 0.0 /100  WBC         Imaging Results (Last 24 Hours)     Procedure Component Value Units Date/Time    CT Head Without Contrast [[id number]] Collected: 10/23/22 2000     Updated: 10/23/22 2011    Narrative:      EXAM/TECHNIQUE: CT HEAD WO CONTRAST-     INDICATION: Delirium, altered mental status     COMPARISON: None available.     DLP: 535 mGy cm. Automated exposure control was also utilized to  decrease patient radiation dose.     FINDINGS:     No evidence of intracranial hemorrhage. Gray-white differentiation is  maintained. No midline shift or mass effect. Lateral ventricles are  nondilated. Basilar cisterns are patent. No acute orbital finding.  Mastoid air cells are clear. Paranasal sinuses are clear. No acute  osseous finding.       Impression:         No acute intracranial findings.  This report was finalized on 10/23/2022 20:08 by Dr. Alec Colón MD.    XR Chest 1 View [[id number]] Collected: 10/23/22 1927     Updated: 10/23/22 1935    Narrative:      EXAM/TECHNIQUE: XR CHEST 1 VW-     INDICATION: altered mental status; R41.82-Altered mental status,  unspecified     COMPARISON: None available.     FINDINGS:     The cardiac silhouette is normal in size. No pleural effusion,  pneumothorax, or focal consolidation. No acute osseous finding.       Impression:         No acute findings  This report was finalized on 10/23/2022 19:28 by Dr. Alec Colón MD.          Assessment / Plan   Assessment:   Active Hospital Problems    Diagnosis    • **Paranoid delusion (HCC)    • Homicidal ideation       Plan:   Dr. Crawford initially requested that I admit this patient to my service here at [facility].  I cannot in good fozia do that after interviewing her  and looking at her medical records.  We do not have any inpatient psychiatric support.  I have high concerns that this is a psychotic/paranoid break.  She apparently has a history of such remotely and may or may have not required admission to a psychiatric  facility.  This was during her prior marriage.  She is on several medications to treat depression and anxiety.  She is on Seroquel  mg nightly for a largely unknown reason when looking at the chart.  He is not sure why she is on it either.    Nothing metabolically or structurally has arisen in the emergency department.  She does test positive for benzodiazepines, but takes clorazepate and he states that he may have given that to her recently.  He apparently has started managing her medications over the last week and has not given her everything that she typically takes.  He seems overwhelmed at her current state.  She has been having visual hallucinations and paranoid delusions.  She made homicidal overtones earlier in the evening.      Dr. Crawford is going to place her on a psych hold and consult [facility].  I think that is in her best interest.    He has added alcohol level, salicylates, and acetaminophen.  I would also check a TSH, ammonia, B12, folate, and RPR.    Code Status/Advanced Care Plan: Full with full interventions.    The patient's surrogate decision maker is her , Kedar.     I discussed my findings and recommendations with her , Dr. Crawford, and her nurse (Yumiko).     Patient seen and examined by me on 10/23/2022 at 2340.    Electronically signed by Hermann Nj DO, 10/24/22, 00:21 CDT.

## 2022-10-24 NOTE — ED NOTES
"Re-screened pt with suicidal and homicidal questions, pt is alert at this time and denies any suicidal or homicidal behavior, pt's  states today pt became upset because he wouldn't let her go to the neighbors house, she stated to him that if she wasn't able to go to the neighbors [place] everyone was going to die,   states the voices she hears (an geovani) tells her bad things are going to happen to the world, pt has became slightly upset when  told me this information and stated \"Please don't tell them tat I don't talk to anyone,  does admit he has taken her off some of her medicines a week ago due to increasing confusion and weird behavior,  did this in an attempt to improve things but things have gotten worse   "

## 2022-10-24 NOTE — ED NOTES
Spoke with [facility] at this time, they report they are still working on admission at this time , will contact me when ready

## 2022-10-24 NOTE — ED NOTES
Spoke with Sunitha from [facility] at this time, they are accepting pt, Sunitha spoke with  to obtain verbal consent for transfer

## 2022-10-24 NOTE — ED NOTES
reports mental decline in the past two weeks, he reports pt has been talking to angels and held her house shoes to her ears for 4 hours having a conversation, in which that led the pt to become combative, pt was told by these voices to go across the road and when the  wouldn't let her she became combative,  reports she has been seen but never had a scan of her head

## 2022-10-25 LAB — T PALLIDUM AB SER QL IF: NON REACTIVE

## 2022-10-26 LAB
QT INTERVAL: 368 MS
QTC INTERVAL: 455 MS

## 2022-10-28 LAB
BACTERIA SPEC AEROBE CULT: NORMAL
BACTERIA SPEC AEROBE CULT: NORMAL

## 2022-11-03 ENCOUNTER — TELEPHONE (OUTPATIENT)
Dept: PODIATRY | Facility: CLINIC | Age: 72
End: 2022-11-03

## 2022-11-07 ENCOUNTER — TELEPHONE (OUTPATIENT)
Dept: PODIATRY | Facility: CLINIC | Age: 72
End: 2022-11-07

## 2022-11-07 NOTE — TELEPHONE ENCOUNTER
Voice mail was full . Reaching out to see if patient would like to get her no-show appointment rescheduled.

## 2022-12-05 ENCOUNTER — HOSPITAL ENCOUNTER (OUTPATIENT)
Dept: CT IMAGING | Facility: HOSPITAL | Age: 72
Discharge: HOME | End: 2022-12-05
Admitting: FAMILY MEDICINE

## 2022-12-05 ENCOUNTER — TRANSCRIBE ORDERS (OUTPATIENT)
Dept: ADMINISTRATIVE | Facility: HOSPITAL | Age: 72
End: 2022-12-05

## 2022-12-05 DIAGNOSIS — R41.82 ALTERED MENTAL STATUS, UNSPECIFIED ALTERED MENTAL STATUS TYPE: ICD-10-CM

## 2022-12-05 DIAGNOSIS — R41.82 ALTERED MENTAL STATUS, UNSPECIFIED ALTERED MENTAL STATUS TYPE: Primary | ICD-10-CM

## 2022-12-05 PROCEDURE — 70450 CT HEAD/BRAIN W/O DYE: CPT

## 2022-12-19 ENCOUNTER — TRANSCRIBE ORDERS (OUTPATIENT)
Dept: ADMINISTRATIVE | Facility: HOSPITAL | Age: 72
End: 2022-12-19

## 2022-12-19 DIAGNOSIS — R41.82 ALTERED MENTAL STATUS, UNSPECIFIED ALTERED MENTAL STATUS TYPE: Primary | ICD-10-CM

## 2022-12-21 ENCOUNTER — HOSPITAL ENCOUNTER (OUTPATIENT)
Dept: MRI IMAGING | Facility: HOSPITAL | Age: 72
Discharge: HOME | End: 2022-12-21
Admitting: FAMILY MEDICINE

## 2022-12-21 ENCOUNTER — TELEPHONE (OUTPATIENT)
Dept: NEUROLOGY | Age: 72
End: 2022-12-21

## 2022-12-21 DIAGNOSIS — R41.82 ALTERED MENTAL STATUS, UNSPECIFIED ALTERED MENTAL STATUS TYPE: ICD-10-CM

## 2022-12-21 DIAGNOSIS — R93.89 ABNORMAL MRI: Primary | ICD-10-CM

## 2022-12-21 PROCEDURE — 82565 ASSAY OF CREATININE: CPT

## 2022-12-21 PROCEDURE — A9577 INJ MULTIHANCE: HCPCS | Performed by: FAMILY MEDICINE

## 2022-12-21 PROCEDURE — 0 GADOBENATE DIMEGLUMINE 529 MG/ML SOLUTION: Performed by: FAMILY MEDICINE

## 2022-12-21 PROCEDURE — 70553 MRI BRAIN STEM W/O & W/DYE: CPT

## 2022-12-21 RX ADMIN — GADOBENATE DIMEGLUMINE 13 ML: 529 INJECTION, SOLUTION INTRAVENOUS at 11:29

## 2022-12-21 NOTE — TELEPHONE ENCOUNTER
Nani Rubinstein Nelson's office called to schedule appt. For pt. For altered mental status, getting worse.

## 2022-12-22 LAB — CREAT BLDA-MCNC: 0.9 MG/DL (ref 0.6–1.3)

## 2023-01-10 ENCOUNTER — OFFICE VISIT (OUTPATIENT)
Dept: NEUROLOGY | Age: 73
End: 2023-01-10
Payer: MEDICARE

## 2023-01-10 ENCOUNTER — TELEPHONE (OUTPATIENT)
Dept: PSYCHIATRY | Age: 73
End: 2023-01-10

## 2023-01-10 ENCOUNTER — TELEPHONE (OUTPATIENT)
Dept: NEUROLOGY | Age: 73
End: 2023-01-10

## 2023-01-10 VITALS
OXYGEN SATURATION: 98 % | BODY MASS INDEX: 24.84 KG/M2 | DIASTOLIC BLOOD PRESSURE: 60 MMHG | HEART RATE: 101 BPM | SYSTOLIC BLOOD PRESSURE: 84 MMHG | HEIGHT: 62 IN | WEIGHT: 135 LBS

## 2023-01-10 DIAGNOSIS — R41.3 MEMORY LOSS: Primary | ICD-10-CM

## 2023-01-10 DIAGNOSIS — R41.89 COGNITIVE CHANGES: ICD-10-CM

## 2023-01-10 DIAGNOSIS — R55 SYNCOPE AND COLLAPSE: ICD-10-CM
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
## 2023-01-10 DIAGNOSIS — R90.89 MRI OF BRAIN ABNORMAL: ICD-10-CM

## 2023-01-10 DIAGNOSIS — R41.3 MEMORY LOSS: ICD-10-CM

## 2023-01-10 DIAGNOSIS — G47.9 SLEEP DISTURBANCE: ICD-10-CM

## 2023-01-10 DIAGNOSIS — R45.86 MOOD DISTURBANCE: ICD-10-CM

## 2023-01-10 LAB
ALBUMIN SERPL-MCNC: 4.4 G/DL (ref 3.5–5.2)
ALP BLD-CCNC: 85 U/L (ref 35–104)
ALT SERPL-CCNC: 17 U/L (ref 5–33)
ANION GAP SERPL CALCULATED.3IONS-SCNC: 13 MMOL/L (ref 7–19)
AST SERPL-CCNC: 21 U/L (ref 5–32)
BASOPHILS ABSOLUTE: 0.1 K/UL (ref 0–0.2)
BASOPHILS RELATIVE PERCENT: 0.8 % (ref 0–1)
BILIRUB SERPL-MCNC: 0.7 MG/DL (ref 0.2–1.2)
BUN BLDV-MCNC: 21 MG/DL (ref 8–23)
C-REACTIVE PROTEIN: 0.48 MG/DL (ref 0–0.5)
CALCIUM SERPL-MCNC: 10 MG/DL (ref 8.8–10.2)
CHLORIDE BLD-SCNC: 97 MMOL/L (ref 98–111)
CO2: 25 MMOL/L (ref 22–29)
CREAT SERPL-MCNC: 1.2 MG/DL (ref 0.5–0.9)
EOSINOPHILS ABSOLUTE: 0.1 K/UL (ref 0–0.6)
EOSINOPHILS RELATIVE PERCENT: 0.9 % (ref 0–5)
GFR SERPL CREATININE-BSD FRML MDRD: 48 ML/MIN/{1.73_M2}
GLUCOSE BLD-MCNC: 145 MG/DL (ref 74–109)
HCT VFR BLD CALC: 42 % (ref 37–47)
HEMOGLOBIN: 12.7 G/DL (ref 12–16)
IMMATURE GRANULOCYTES #: 0 K/UL
LYMPHOCYTES ABSOLUTE: 1.1 K/UL (ref 1.1–4.5)
LYMPHOCYTES RELATIVE PERCENT: 11.8 % (ref 20–40)
MCH RBC QN AUTO: 26.3 PG (ref 27–31)
MCHC RBC AUTO-ENTMCNC: 30.2 G/DL (ref 33–37)
MCV RBC AUTO: 87 FL (ref 81–99)
MONOCYTES ABSOLUTE: 0.9 K/UL (ref 0–0.9)
MONOCYTES RELATIVE PERCENT: 10.1 % (ref 0–10)
NEUTROPHILS ABSOLUTE: 6.9 K/UL (ref 1.5–7.5)
NEUTROPHILS RELATIVE PERCENT: 76.1 % (ref 50–65)
PDW BLD-RTO: 14.9 % (ref 11.5–14.5)
PLATELET # BLD: 466 K/UL (ref 130–400)
PMV BLD AUTO: 10.3 FL (ref 9.4–12.3)
POTASSIUM SERPL-SCNC: 3.8 MMOL/L (ref 3.5–5)
RBC # BLD: 4.83 M/UL (ref 4.2–5.4)
SEDIMENTATION RATE, ERYTHROCYTE: 14 MM/HR (ref 0–25)
SODIUM BLD-SCNC: 135 MMOL/L (ref 136–145)
TOTAL PROTEIN: 7.9 G/DL (ref 6.6–8.7)
TSH REFLEX FT4: 1.85 UIU/ML (ref 0.35–5.5)
VITAMIN B-12: 1103 PG/ML (ref 211–946)
WBC # BLD: 9.1 K/UL (ref 4.8–10.8)

## 2023-01-10 PROCEDURE — 3017F COLORECTAL CA SCREEN DOC REV: CPT | Performed by: NURSE PRACTITIONER

## 2023-01-10 PROCEDURE — G8484 FLU IMMUNIZE NO ADMIN: HCPCS | Performed by: NURSE PRACTITIONER

## 2023-01-10 PROCEDURE — G8427 DOCREV CUR MEDS BY ELIG CLIN: HCPCS | Performed by: NURSE PRACTITIONER

## 2023-01-10 PROCEDURE — 1090F PRES/ABSN URINE INCON ASSESS: CPT | Performed by: NURSE PRACTITIONER

## 2023-01-10 PROCEDURE — 99214 OFFICE O/P EST MOD 30 MIN: CPT | Performed by: NURSE PRACTITIONER

## 2023-01-10 PROCEDURE — G8400 PT W/DXA NO RESULTS DOC: HCPCS | Performed by: NURSE PRACTITIONER

## 2023-01-10 PROCEDURE — G8420 CALC BMI NORM PARAMETERS: HCPCS | Performed by: NURSE PRACTITIONER

## 2023-01-10 PROCEDURE — 1123F ACP DISCUSS/DSCN MKR DOCD: CPT | Performed by: NURSE PRACTITIONER

## 2023-01-10 PROCEDURE — 1036F TOBACCO NON-USER: CPT | Performed by: NURSE PRACTITIONER

## 2023-01-10 RX ORDER — RISPERIDONE 1 MG/1
TABLET ORAL
COMMUNITY
Start: 2022-12-19

## 2023-01-10 RX ORDER — ASPIRIN 81 MG/1
81 TABLET ORAL DAILY
COMMUNITY

## 2023-01-10 RX ORDER — CHOLECALCIFEROL (VITAMIN D3) 125 MCG
5 CAPSULE ORAL NIGHTLY
COMMUNITY

## 2023-01-10 RX ORDER — MEMANTINE HYDROCHLORIDE 10 MG/1
10 TABLET ORAL 2 TIMES DAILY
Qty: 60 TABLET | Refills: 3 | Status: SHIPPED | OUTPATIENT
Start: 2023-01-10

## 2023-01-10 NOTE — PROGRESS NOTES
[id number] [facility] Neurology Office Note      Patient:   Sarah Addison  MR#:    [id number]  Account Number:                         YOB: 1950  Date of Evaluation:  1/10/2023  Time of Note:                          12:45 PM  Primary/Referring Physician:  Toby Russell DO   Consulting Physician:  Juan Pablo Reed [address] UP    Chief Complaint   Patient presents with    Follow-up     Memory loss/altered mental status        HISTORY OF PRESENT ILLNESS    Sarah Addison is a 81y.o. year old female here for hospital follow up related to cognitive changes. She has been having issues with memory loss and psychosis, falls, and imbalance. She has seen Kary Ott in the office before for chronic pain/numbness and tingling issues. She is here today with  Diana Swanson) and daughter. Daughter recently came here from [facility] to try to assist them. 10/23 she was seen in [facility] ER for AMS, acute psychosis, and hallucinations. ER found no metabolic or structural source for AMS, was felt she was having psychiatric event. She was experiencing visual hallucinations and paranoid delusions along with homicidal overtones. Prior history of similar past event which required admission but this was many years ago. She was admitted to [facility] in [place] for psychiatric care for 3 weeks. Was started on Risperidone. She denies current hallucinations or mood disturbance. Short term memory loss is noted. ADL's are affected. She is not bathing, eating, or dressing without being family intervention. When offered food appetite is fair. She has been having weight loss as well. She has long history of sleeping issues including insomnia and possibly undiagnosed ELODIA. Daughter is sleep technician and notes in the past she has witnessed apneic-appearing episodes with napping.   relates she goes through spells of not sleeping and she will wake up at night and wander through the house, sometimes runs through house and falls at night. She has imbalance and falling. She does have ecchymosis to right eye today from previous fall.  relates she did have two spells of falling into floor and being unconscious. He states they lasted for about 10-15 minutes. She did have confusion following these events. No history of meningitis, encephalitis, TBI. No family history of seizure disorder. No incontinence, GTC activity, tongue biting. She has had issues with infected root canal months ago, this is resolved. No fevers, neck stiffness. There is a family history of dementia with mother. MRI completed at [facility] as below. Has poor vision, no clear vision changes but has lost eye glasses. It is of note she is hypotensive today- she is asymptomatic. Past Medical History:   Diagnosis Date    Anemia     Arthritis     Asthma     Hypertension        Past Surgical History:   Procedure Laterality Date    HC INJECT OTHER PERPHRL NERV Bilateral 2017    BILATERAL FLURO GUIDED TROCHANTERIC BURSA HIP INJECTIONS performed by Farida Garland MD at [facility]       History reviewed. No pertinent family history.     Social History     Socioeconomic History    Marital status:      Spouse name: Not on file    Number of children: Not on file    Years of education: Not on file    Highest education level: Not on file   Occupational History    Not on file   Tobacco Use    Smoking status: Former     Types: Cigarettes     Quit date: 1/10/2006     Years since quittin.0    Smokeless tobacco: Never   Vaping Use    Vaping Use: Never used   Substance and Sexual Activity    Alcohol use: Not Currently    Drug use: Not Currently    Sexual activity: Not on file   Other Topics Concern    Not on file   Social History Narrative    Not on file     Social Determinants of Health     Financial Resource Strain: Not on file   Food Insecurity: Not on file   Transportation Needs: Not on file   Physical Activity: Not on file   Stress: Not on file   Social Connections: Not on file   Intimate Partner Violence: Not on file   Housing Stability: Not on file       Current Outpatient Medications   Medication Sig Dispense Refill    risperiDONE (RISPERDAL) 1 MG tablet TAKE 1-2 TABLETS BY MOUTH TWICE A DAY      melatonin 5 MG TABS tablet Take 5 mg by mouth nightly      aspirin 81 MG EC tablet Take 81 mg by mouth daily      memantine (NAMENDA) 10 MG tablet Take 1 tablet by mouth 2 times daily 60 tablet 3    pravastatin (PRAVACHOL) 40 MG tablet       loratadine (CLARITIN) 10 MG tablet Take 10 mg by mouth daily      olmesartan (BENICAR) 40 MG tablet Take 40 mg by mouth daily      traZODone (DESYREL) 100 MG tablet Take 100 mg by mouth nightly      FENOFIBRATE PO Take 160 mg by mouth daily      omeprazole 20 MG EC tablet Take 40 mg by mouth daily       MOVANTIK 25 MG TABS tablet  (Patient not taking: Reported on 1/10/2023)      doxepin (SINEQUAN) 25 MG capsule Take 25 mg by mouth nightly (Patient not taking: Reported on 1/10/2023)      ondansetron (ZOFRAN ODT) 4 MG disintegrating tablet Take 1 tablet by mouth every 8 hours as needed for Nausea or Vomiting (Patient not taking: Reported on 1/10/2023) 30 tablet 0    Clorazepate Dipotassium (TRANXENE-T PO) Take by mouth (Patient not taking: Reported on 1/10/2023)      Cholecalciferol (VITAMIN D-3 PO) Take by mouth (Patient not taking: Reported on 1/10/2023)       No current facility-administered medications for this visit.        Allergies   Allergen Reactions    Flagyl [Metronidazole]     Macrodantin [Nitrofurantoin]     Pcn [Penicillins]     Sulfa Antibiotics          REVIEW OF SYSTEMS  Constitutional: []Fever []Sweat []Chills [x] Recent Injury [x] Denies all unless marked  HEENT:[x]Headache  [] Head Injury/Hearing Loss  [] Sore Throat  [] Ear Ache/Dizziness  [x] Denies all unless marked  Spine:  [] Neck pain  [] Back pain  [] Sciaticia  [x] Denies all unless marked  Cardiovascular:[]Heart Disease [x]Chest Pain [] Palpitations  [x] Denies all unless marked  Pulmonary: []Shortness of Breath []Cough   [x] Denies all unless marke  Gastrointestinal: []Nausea  []Vomiting  []Abdominal Pain  []Constipation  []Diarrhea  []Dark Bloody Stools  [x] Denies all unless marked  Psychiatric/Behavioral:[x] Depression [x] Anxiety [x] Denies all unless marked  Genitourinary:   [] Frequency  [] Urgency  [] Incontinence [] Pain with Urination  [x] Denies all unless marked  Extremities: []Pain  []Swelling  [x] Denies all unless marked  Musculoskeletal: [] Muscle Pain  [] Joint Pain  [] Arthritis [] Muscle Cramps [] Muscle Twitches  [x] Denies all unless marked  Sleep: [x] Insomnia [] Snoring [] Restless Legs [] Sleep Apnea  [] Daytime Sleepiness  [x] Denies all unless marked  Skin:[] Rash [] Skin Discoloration [x] Denies all unless marked   Neurological: [x]Visual Disturbance/Memory Loss [x] Loss of Balance [] Slurred Speech/Weakness [] Seizures  [x] Vertigo/Dizziness [x] Denies all unless marked       The MA has completed the ROS with the patient. I have reviewed it in its' entirety with the patient and agree with the documentation. PHYSICAL EXAM  BP 84/60   Pulse (!) 101   Ht 5' 2\" (1.575 m)   Wt 135 lb (61.2 kg)   SpO2 98%   BMI 24.69 kg/m²       Constitutional - No acute distress    HEENT- Conjunctiva normal.  No scars, masses, or lesions over external nose or ears, no neck masses noted, no jugular vein distension, no bruit  Cardiac- Regular rate and rhythm  Pulmonary- Good expansion, normal effort without use of accessory muscles  Musculoskeletal - No significant wasting of muscles noted, no bony deformities  Extremities - No clubbing, cyanosis or edema  Skin - Warm, dry, and intact. No rash, erythema, or pallor.  Healing ecchymosis right orbit  Psychiatric - Labile mood, easily agitated    NEUROLOGICAL EXAM     Mental status   [x] Awake, alert, oriented   []Affect attention and concentration appear appropriate  []Recent and remote memory appears unremarkable  [x]Speech normal without dysarthria or aphasia, comprehension and repetition intact. COMMENTS:MoCA 10/30    Cranial Nerves [x]No VF deficit to confrontation  [x]PERRLA, EOMI, no nystagmus, conjugate eye movements, no ptosis  [x]Face symmetric  [x]Facial sensation intact  [x]Tongue midline no atrophy or fasciculations present  [x]Palate midline, hearing to finger rub normal bilaterally  [x]Shoulder shrug and SCM testing normal bilaterally  COMMENTS:   Motor   [x]5/5 strength x 4 extremities  [x]Normal bulk and tone  [x]No tremor present  [x]No rigidity or bradykinesia noted  COMMENTS:   Sensory  [x]Sensation intact to light touch, vibration, and proprioception BLE  [x]Sensation intact to light touch, vibration, and proprioception BUE  COMMENTS:   Coordination [x]FTN normal bilaterally   []HTS normal bilaterally  [x]ZOLTAN normal bilaterally.    COMMENTS:   Reflexes  [x]Symmetric and non-pathological  []Toes down going bilaterally  [x]No clonus present  COMMENTS:   Gait                  []Normal steady gait    []Ataxic    []Spastic     []Magnetic     []Shuffling  COMMENTS:imbalance noted, antalgic       LABS RECORD AND IMAGING REVIEW (As below and per HPI)    Lab Results   Component Value Date    OOMCNTUD87 1103 (H) 01/10/2023     Lab Results   Component Value Date    WBC 9.1 01/10/2023    HGB 12.7 01/10/2023    HCT 42.0 01/10/2023    MCV 87.0 01/10/2023     (H) 01/10/2023     Lab Results   Component Value Date     (L) 01/10/2023    K 3.8 01/10/2023    CL 97 (L) 01/10/2023    CO2 25 01/10/2023    BUN 21 01/10/2023    CREATININE 1.2 (H) 01/10/2023    GLUCOSE 145 (H) 01/10/2023    CALCIUM 10.0 01/10/2023    PROT 7.9 01/10/2023    LABALBU 4.4 01/10/2023    BILITOT 0.7 01/10/2023    ALKPHOS 85 01/10/2023    AST 21 01/10/2023    ALT 17 01/10/2023    LABGLOM 48 (A) 01/10/2023    GFRAA >59 01/10/2022       Lab Results   Component Value Date    CRP 0.61 (H) 01/10/2022    SEDRATE 14 01/10/2023 XR SCAPULA RIGHT (COMPLETE)    Result Date: 8/18/2022  NO PRIOR REPORT AVAILABLE Exam: X-RAYS OF THE RIGHT SCAPULA Clinical data: Chronic right shoulder pain. Technique: Two views of the right scapula. Prior studies: No prior studies submitted. Findings: Normal mineralization, architecture and alignment. No fracture or osseous lesion identified. Normal joints without evidence of arthritis or effusion. Unremarkable soft tissues. No acute fractures or dislocations appreciated. Recommendation: Follow up as clinically indicated. Electronically Signed by Day Isaac MD at 18-Aug-2022 04:58:49 AM             XR SHOULDER RIGHT (MIN 2 VIEWS)    Result Date: 8/18/2022  NO PRIOR REPORT AVAILABLE Exam: X-RAYS OF THE RIGHT SHOULDER Clinical data:Chronic right shoulder pain. Technique: Two views of the right shoulder. Prior studies: No prior studies submitted. Findings: Normal mineralization, architecture and alignment. No fracture or osseous lesion identified. The humeral head is well approximated in the glenoid fossa. Unremarkable soft tissues. No acute fractures or dislocations appreciated. Recommendation:Follow up as clinically indicated. Electronically Signed by Day Isaac MD at 18-Aug-2022 04:58:02 AM        CT HEAD WO CONTRAST     Result Date: 10/26/2021  EXAMINATION:  CT HEAD WO CONTRAST  10/26/2021 6:29 PM HISTORY: The patient fell 7 weeks ago. The patient has headache. TECHNIQUE: Multiple axial images were obtained through the brain without contrast infusion. Multiplanar images were reconstructed. DLP: 856 mGy-cm. Automated exposure control was utilized. COMPARISON: No comparison study. FINDINGS: There are no hemorrhage, edema or mass effect. There is mild atrophy. The ventricular system is nondilated. No calvarial fracture is seen. The visualized paranasal sinuses are clear. The mastoid air cells are clear. 1. There are no hemorrhage, edema or mass effect. There are no acute findings.  2. Mild atrophy. The full report of this exam was immediately signed and available to the emergency room. The patient is currently in the emergency room. Signed by Dr Lester Cha     Result Date: 10/26/2021  EXAMINATION:  CT CHEST W CONTRAST  10/26/2021 6:49 PM HISTORY: The patient fell 7 weeks ago and has rib pain. The patient did not have a chest x-ray or rib series today. COMPARISON: No comparison study. DLP: 9666 mGy-cm. Automated exposure control was utilized. TECHNIQUE: Spiral CT was performed of the chest with contrast. Multiplanar images were reconstructed. MEDIASTINUM/VASCULAR: There is minimal atheromatous disease of the thoracic aorta. There is no evidence of acute traumatic aortic injury. There is minimal coronary artery calcification. Heart size is mildly prominent. There are no pathologically enlarged mediastinal or hilar lymph nodes. LUNGS: There is minimal dependent atelectasis. There is minimal atelectasis or scarring in the right upper lobe inferiorly adjacent to the minor fissure image 66 of series 6, images 58 and 59 series 7 and image 131 series 8. UPPER ABDOMEN: Please see the abdomen and pelvis CT report separately. BONES AND SOFT TISSUES: There are degenerative changes of the thoracic spine. The sternum is intact. No displaced rib fracture is seen. The scapulae are intact. The clavicles are intact. 1. No evidence of lung contusion or pneumothorax. Mild dependent atelectasis. Minimal atelectasis or scarring in the right upper lobe inferiorly adjacent to the minor fissure. 2. Minimal atheromatous disease of the thoracic aorta and coronary arteries. No evidence of mediastinal hematoma/hemorrhage. 3. No acute bony abnormality is seen. The full report of this exam was immediately signed and available to the emergency room. The patient is currently in the emergency room.  Signed by Dr Cleo Haney     CT Cervical Spine WO Contrast     Result Date: 10/26/2021  EXAMINATION:  CT CERVICAL SPINE WO CONTRAST  10/26/2021 6:31 PM HISTORY: The patient fell 7 weeks ago. Headaches. No x-rays were obtained. TECHNIQUE: Spiral CT was performed of the cervical spine. Sagittal and coronal images were reconstructed. COMPARISON: No comparison study. DLP: 491 mGy-cm. Automated exposure control was utilized. FINDINGS: The cervical spine demonstrates normal alignment. No fractures identified. There is degenerative disc disease with uncinate spurring at C5-6 and C6-7. There is moderate right-sided foraminal narrowing at C6-7.     1. No evidence of cervical spine fracture. 2. Degenerative changes of the cervical spine, as described. The full report of this exam was immediately signed and available to the emergency room. The patient is currently in the emergency room. Signed by Dr Vance Miguel     Result Date: 10/26/2021  EXAMINATION:  CT THORACIC SPINE WO CONTRAST  10/26/2021 6:39 PM HISTORY: The patient fell 7 weeks ago. The patient did not have any thoracic spine x-rays today. The patient has back pain. COMPARISON: No comparison study. TECHNIQUE: Spiral CT was performed of the thoracic spine. Sagittal and coronal images were reconstructed. DLP: 6016 mGY-cm. Automated exposure control was utilized. FINDINGS: There is no evidence of thoracic spine fracture. There is mild disc narrowing and endplate spurring at multiple thoracic levels. There is no significant spinal or foraminal stenosis identified. No acute findings. The full report of this exam was immediately signed and available to the emergency room. The patient is currently in the emergency room. Signed by Dr Susan Babb     CT Lumbar Spine WO Contrast     Result Date: 10/26/2021  EXAMINATION:  CT LUMBAR SPINE WO CONTRAST  10/26/2021 6:34 PM HISTORY: The patient fell 7 weeks ago. The patient has back pain. The patient did not have any x-rays of the lumbar spine performed in the emergency room.  TECHNIQUE: Spiral CT was performed of the lumbar spine. Sagittal and coronal images were reconstructed. DLP: 0793 mGy-cm. Automated exposure control was utilized. COMPARISON: No comparison study. FINDINGS: There is atheromatous disease of the aortoiliac vessels. There are 2 cystic areas within the left ovary. The larger area measures 2.6 cm transversely. The uterus is surgically absent. The right ovary may also be surgically absent. No lumbar spine fracture is identified. There is a transitional L5 vertebral body that is at least partially sacralized. At L1-2, there is mild disc narrowing and mild disc bulging. There is mild inferior recess foraminal narrowing bilaterally. At L2-3, there is moderate disc narrowing and moderate disc bulging. There is mild facet arthropathy. There is mild narrowing of the central canal. There is mild to moderate inferior recess foraminal narrowing bilaterally. At L3-4, there is mild disc narrowing. There is moderate disc bulging. There is mild facet arthropathy. There is mild narrowing of the central canal. There is mild to moderate inferior recess foraminal narrowing bilaterally. At L4-5, there is mild to moderate disc bulging. The disc is maintained in height. There is moderate facet arthropathy. There is no significant central spinal canal narrowing. There is mild inferior recess foraminal narrowing bilaterally. At L5-S1, there is a small hypoplastic disc space. There is no spinal or foraminal stenosis. 1. No lumbar spine fracture is seen. 2. Lumbar spine degenerative changes, as described. 3. 2 cystic areas within the left ovary the largest of which measures 2.6 cm. GYN follow-up recommended. The full report of this exam was immediately signed and available to the emergency room. The patient is currently in the emergency room.  Signed by Dr Gurpreet Hdez Additional Contrast? None     Result Date: 10/26/2021  EXAMINATION:  CT ABDOMEN PELVIS W IV CONTRAST 10/26/2021 6:41 PM HISTORY: The patient fell 7 weeks ago. The patient has epigastric pain and vomiting. The patient has rib pain. TECHNIQUE: Spiral CT was performed of the abdomen and pelvis with contrast. Multiplanar images were reconstructed. DLP: 7074 mGy-cm. Automated exposure control was utilized. COMPARISON: No comparison study. LUNG BASES: Please see the chest CT report separately. LIVER AND SPLEEN: There has been prior cholecystectomy. There is mild fatty infiltration of the liver. Minimal prominence of the biliary tree is felt to represent reservoir effect. The spleen is unremarkable. There is no evidence of acute traumatic injury. PANCREAS: There is no pancreatic mass or inflammatory change. There is no evidence of acute traumatic injury. KIDNEYS AND ADRENALS: The adrenal glands and left kidney are unremarkable. There is a 1.2 cm cyst arising from the upper pole right kidney. The ureters are nondilated. The urinary bladder is unremarkable. BOWEL: There is mild underdistention of the stomach. Small bowel loops are nondilated. There is underdistention of portions of the colon. There is mild diverticulosis of the colon. OTHER: There has been prior hysterectomy. There are 2 cystic areas within the left ovary the largest of which measures 2.7 cm. The right ovary is not visible. There is no free fluid. There are degenerative changes of the spine. There is avascular necrosis of the femoral heads bilaterally. The patient has had bilateral core decompression. 1. No solid organ injury or hemoperitoneum. 2. Mild fatty liver. Prior cholecystectomy. Mild prominence of the biliary tree likely related to reservoir effect. 3. There is a 1.2 cm cyst arising from the upper pole right kidney. 4. There are 2 cystic areas within the left ovary the largest of which measures 2.7 cm. GYN follow-up recommended in a patient of this age.  Small cystic neoplasms are in the differential. However, no soft tissue nodularity or septation is seen. 5. Prior hysterectomy. 6. 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 Degenerative changes of the spine. The full report of this exam was immediately signed and available to the emergency room. The patient is currently in the emergency room. Signed by Dr Nannette Montiel     MRI brain (2021)- nothing acute;      Impression  1. Empty sella which is more prominent than on the previous examination   of November of last year. There is also prominence of the subarachnoid   spaces and CSF surrounding the optic nerves with some tortuosity. There   is also flattening of the transverse sinuses bilaterally. Although   nonspecific these findings can be seen with intracranial hypertension   and should be correlated with the patient's history. 2. There is mild atrophy. Scattered foci of T2 abnormality are noted   involving the periventricular and higher white matter tracts suggesting   mild small vessel disease. Foci of gyriform gliosis within the right   postcentral gyrus and left posterior parietal/occipital cortex likely   represent sequela of previous ischemia and are stable from the previous   exam.   3. No evidence of diffusion restriction to suggest acute ischemia or   infarct. There are no foci of abnormal contrast enhancement. This report was finalized on 2022 12:52 by Dr. Anita Noe MD.         Reviewed referral records     ASSESSMENT:    Meghan Caballero is a 81y.o. year old female here for hospital follow up. She has been having issues with memory loss and psychosis, falls, and imbalance. She is here today with  Tammi Dennison) and daughter. This event started in  and she was seen in [facility] ER for AMS. ER work up was grossly negative with no metabolic or structural source for AMS, was felt she was having psychiatric event. She was experiencing visual hallucinations and paranoid delusions along with homicidal overtones.  There was a question of medication compliance as well and some confusion regarding what she was taking. She was admitted to inpatient psychiatric facility at [facility] for 3 weeks.  feels she has improved, she is on risperidone at present, denies mood issues or hallucinations. No SI/HI. ADL's are affected as she requires reminders or assistance to complete these. There are also sleep disturbances including insomnia and possible ELODIA. Sleep referral placed for further evaluation. MoCA 10/30, impairment to executive functioning noted along with language, delayed recall, abstraction. MRI Brain notes chronic changes, atrophy, . There is also increased prominence of empty sella and CSF surrounding optic nerves with some tortuosity. Will send referral to ophthalmology to assess for papilledema although IIH felt less likely. Strongly recommend PT due to falls and imbalance, patient declines. Discussed fall risks. Will order labs today to rule out secondary source of cognitive loss. They would like to proceed with memory medicine today. Discussed trial of Namenda and titration. Memory loss felt multi-factorial including age, sleep disturbance, underlying dementia possible but felt there is psychiatric component as well. There is family history of dementia in mother. Will order EEG to further evaluate syncopal events, seizure disorder felt less likely. Question if hypotension playing role in this. She is hypotensive today 84/60. No symptoms in office. She has upcoming appt with PCP tomorrow and plans to discuss this. ICD-10-CM    1. Memory loss  R41.3 KAREN Screen with Reflex     CBC with Auto Differential     Comprehensive Metabolic Panel     C-Reactive Protein     Methylmalonic Acid, Serum     Vitamin B12     Vitamin B1, Whole Blood     TSH with Reflex to FT4     Sedimentation Rate     RPR     [facility], Neurology, [facility] External Referral To Ophthalmology     memantine (NAMENDA) 10 MG tablet      2. Cognitive changes  R41.89 KAREN Screen with Reflex     CBC with Auto Differential     Comprehensive Metabolic Panel     C-Reactive Protein     Methylmalonic Acid, Serum     Vitamin B12     Vitamin B1, Whole Blood     TSH with Reflex to FT4     Sedimentation Rate     RPR     [place], [place], Neurology, [place]     Amb External Referral To Ophthalmology     memantine (NAMENDA) 10 MG tablet      3. MRI of brain abnormal  R90.89 Amb External Referral To Ophthalmology      4. Transient alteration of awareness  R40.4       5. Sleep disturbance  [address], [place], Neurology, [place]      6. Fall, initial encounter  [address] XXX[address]       7. Mood disturbance  R45.86       8. Syncope and collapse  R55           PLAN:  Sleep referral to assess for insomnia/ELODIA. Labs as listed. Opthalmology referral due to abnormal MRI brain. Recommend PT for imbalance/falling- patient declining this today. Namenda titration to 10 mg BID, discussed side effects. Records from [facility] from psychiatric admission. Discuss hypotension with PCP; increase fluids, stand and move slowly. Fall precautions discussed. EEG ordered to rule out underlying epilepsy  Psychiatric referral placed. 11. Return in about 2 months (around 3/10/2023) for Follow up, sooner with worsening symptoms.     GARY Alvarez

## 2023-01-10 NOTE — PROGRESS NOTES
REVIEW OF SYSTEMS    Constitutional: []Fever []Sweat []Chills [x] Recent Injury [x] Denies all unless marked  HEENT:[x]Headache  [] Head Injury/Hearing Loss  [] Sore Throat  [] Ear Ache/Dizziness  [x] Denies all unless marked  Spine:  [] Neck pain  [] Back pain  [] Sciaticia  [x] Denies all unless marked  Cardiovascular:[]Heart Disease [x]Chest Pain [] Palpitations  [x] Denies all unless marked  Pulmonary: []Shortness of Breath []Cough   [x] Denies all unless marke  Gastrointestinal: []Nausea  []Vomiting  []Abdominal Pain  []Constipation  []Diarrhea  []Dark Bloody Stools  [x] Denies all unless marked  Psychiatric/Behavioral:[x] Depression [x] Anxiety [x] Denies all unless marked  Genitourinary:   [] Frequency  [] Urgency  [] Incontinence [] Pain with Urination  [x] Denies all unless marked  Extremities: []Pain  []Swelling  [x] Denies all unless marked  Musculoskeletal: [] Muscle Pain  [] Joint Pain  [] Arthritis [] Muscle Cramps [] Muscle Twitches  [x] Denies all unless marked  Sleep: [x] Insomnia [] Snoring [] Restless Legs [] Sleep Apnea  [] Daytime Sleepiness  [x] Denies all unless marked  Skin:[] Rash [] Skin Discoloration [x] Denies all unless marked   Neurological: [x]Visual Disturbance/Memory Loss [x] Loss of Balance [] Slurred Speech/Weakness [] Seizures  [x] Vertigo/Dizziness [x] Denies all unless marked

## 2023-01-10 NOTE — PATIENT INSTRUCTIONS
Namenda titration:     Week 1: Take 1/2 tablet in the morning   Week 2: Take 1/2 tablet in the morning and 1/2 tablet at night   Week 3: Take 1 tablet in the morning and 1/2 tablet at night   Week 4 and on:  Take 1 tablet in the morning and 1 tablet at night

## 2023-01-10 NOTE — TELEPHONE ENCOUNTER
Please call Roxane Yao records reviewed. I am putting in referral for Psychiatry as she needs to be established with one to monitor mood and medications. I am also adding in an EEG to assess brain waves due to sycnopal events and memory loss.

## 2023-01-10 NOTE — TELEPHONE ENCOUNTER
Called pt to schedule an appt from a referral    No answer and LVM.     Electronically signed by John Fonseca MA on 1/10/2023 at 3:56 PM

## 2023-01-11 ENCOUNTER — TELEPHONE (OUTPATIENT)
Dept: NEUROLOGY | Age: 73
End: 2023-01-11

## 2023-01-11 LAB — ANA IGG, ELISA: NORMAL

## 2023-01-11 NOTE — TELEPHONE ENCOUNTER
Called and spoke with patients  as she was asleep at this time and he is on her HIPPA form. Advised that Artis Gerardo had ordered a psychiatry referral to get her established with some one to monitor mood and manage meds. Also advised that an EEG has been ordered since she has had syncopal episodes and memory loss. He voiced his understanding.

## 2023-01-12 LAB — RPR: NORMAL

## 2023-01-15 LAB — VITAMIN B1 WHOLE BLOOD: 87 NMOL/L (ref 70–180)

## 2023-01-16 ENCOUNTER — TELEPHONE (OUTPATIENT)
Dept: PSYCHIATRY | Age: 73
End: 2023-01-16

## 2023-01-16 LAB — METHYLMALONIC ACID: 0.2 UMOL/L (ref 0–0.4)

## 2023-01-16 NOTE — TELEPHONE ENCOUNTER
Called pt to schedule an appt from a referral    No answer and vm is full.     Electronically signed by Kaylen Patton MA on 1/16/2023 at 10:06 AM

## 2023-01-19 ENCOUNTER — TELEPHONE (OUTPATIENT)
Dept: PSYCHIATRY | Age: 73
End: 2023-01-19

## 2023-01-19 NOTE — TELEPHONE ENCOUNTER
Called pt to schedule an appt from a referral    No answer and VM is full. This is the third attempt to reach this pt ans its up to the pt to call and schedule an appt.     Electronically signed by Leandro Posadas MA on 1/19/2023 at 4:26 PM

## 2023-02-17 ENCOUNTER — OFFICE VISIT (OUTPATIENT)
Dept: NEUROLOGY | Age: 73
End: 2023-02-17

## 2023-02-17 ENCOUNTER — HOSPITAL ENCOUNTER (EMERGENCY)
Age: 73
Discharge: HOME | End: 2023-02-17
Payer: MEDICARE

## 2023-02-17 VITALS
WEIGHT: 135 LBS | SYSTOLIC BLOOD PRESSURE: 125 MMHG | DIASTOLIC BLOOD PRESSURE: 78 MMHG | OXYGEN SATURATION: 99 % | BODY MASS INDEX: 24.84 KG/M2 | HEIGHT: 62 IN | HEART RATE: 89 BPM

## 2023-02-17 VITALS
DIASTOLIC BLOOD PRESSURE: 91 MMHG | TEMPERATURE: 98 F | SYSTOLIC BLOOD PRESSURE: 169 MMHG | RESPIRATION RATE: 18 BRPM | OXYGEN SATURATION: 97 % | HEART RATE: 95 BPM

## 2023-02-17 DIAGNOSIS — R41.82 ALTERED MENTAL STATUS, UNSPECIFIED ALTERED MENTAL STATUS TYPE: Primary | ICD-10-CM

## 2023-02-17 DIAGNOSIS — R25.9 ABNORMAL MOVEMENTS: ICD-10-CM

## 2023-02-17 DIAGNOSIS — R44.3 HALLUCINATIONS: Primary | ICD-10-CM

## 2023-02-17 DIAGNOSIS — G47.9 SLEEP DISTURBANCE: ICD-10-CM

## 2023-02-17 DIAGNOSIS — R45.1 AGITATION: ICD-10-CM

## 2023-02-17 DIAGNOSIS — Z86.59 HISTORY OF PSYCHIATRIC HOSPITALIZATION: ICD-10-CM

## 2023-02-17 LAB
ALBUMIN SERPL-MCNC: 4 G/DL (ref 3.5–5.2)
ALP BLD-CCNC: 72 U/L (ref 35–104)
ALT SERPL-CCNC: 15 U/L (ref 5–33)
AMPHETAMINE SCREEN, URINE: NEGATIVE
ANION GAP SERPL CALCULATED.3IONS-SCNC: 8 MMOL/L (ref 7–19)
AST SERPL-CCNC: 15 U/L (ref 5–32)
BARBITURATE SCREEN URINE: NEGATIVE
BASOPHILS ABSOLUTE: 0.1 K/UL (ref 0–0.2)
BASOPHILS RELATIVE PERCENT: 0.6 % (ref 0–1)
BENZODIAZEPINE SCREEN, URINE: POSITIVE
BILIRUB SERPL-MCNC: <0.2 MG/DL (ref 0.2–1.2)
BILIRUBIN URINE: NEGATIVE
BLOOD, URINE: NEGATIVE
BUN BLDV-MCNC: 18 MG/DL (ref 8–23)
BUPRENORPHINE URINE: NEGATIVE
CALCIUM SERPL-MCNC: 9.7 MG/DL (ref 8.8–10.2)
CANNABINOID SCREEN URINE: NEGATIVE
CHLORIDE BLD-SCNC: 105 MMOL/L (ref 98–111)
CLARITY: CLEAR
CO2: 29 MMOL/L (ref 22–29)
COCAINE METABOLITE SCREEN URINE: NEGATIVE
COLOR: YELLOW
CREAT SERPL-MCNC: 0.6 MG/DL (ref 0.5–0.9)
EOSINOPHILS ABSOLUTE: 0.2 K/UL (ref 0–0.6)
EOSINOPHILS RELATIVE PERCENT: 1.7 % (ref 0–5)
ETHANOL: <10 MG/DL (ref 0–0.08)
GFR SERPL CREATININE-BSD FRML MDRD: >60 ML/MIN/{1.73_M2}
GLUCOSE BLD-MCNC: 106 MG/DL (ref 74–109)
GLUCOSE URINE: NEGATIVE MG/DL
HCT VFR BLD CALC: 40.1 % (ref 37–47)
HEMOGLOBIN: 12.6 G/DL (ref 12–16)
IMMATURE GRANULOCYTES #: 0 K/UL
KETONES, URINE: NEGATIVE MG/DL
LEUKOCYTE ESTERASE, URINE: NEGATIVE
LYMPHOCYTES ABSOLUTE: 0.9 K/UL (ref 1.1–4.5)
LYMPHOCYTES RELATIVE PERCENT: 8.7 % (ref 20–40)
Lab: ABNORMAL
MCH RBC QN AUTO: 26 PG (ref 27–31)
MCHC RBC AUTO-ENTMCNC: 31.4 G/DL (ref 33–37)
MCV RBC AUTO: 82.9 FL (ref 81–99)
METHADONE SCREEN, URINE: NEGATIVE
METHAMPHETAMINE, URINE: NEGATIVE
MONOCYTES ABSOLUTE: 0.7 K/UL (ref 0–0.9)
MONOCYTES RELATIVE PERCENT: 6.8 % (ref 0–10)
NEUTROPHILS ABSOLUTE: 8.1 K/UL (ref 1.5–7.5)
NEUTROPHILS RELATIVE PERCENT: 81.9 % (ref 50–65)
NITRITE, URINE: NEGATIVE
OPIATE SCREEN URINE: NEGATIVE
OXYCODONE URINE: NEGATIVE
PDW BLD-RTO: 14.6 % (ref 11.5–14.5)
PH UA: 5.5 (ref 5–8)
PHENCYCLIDINE SCREEN URINE: NEGATIVE
PLATELET # BLD: 322 K/UL (ref 130–400)
PMV BLD AUTO: 11 FL (ref 9.4–12.3)
POTASSIUM SERPL-SCNC: 3.1 MMOL/L (ref 3.5–5)
PROPOXYPHENE SCREEN: NEGATIVE
PROTEIN UA: NEGATIVE MG/DL
RBC # BLD: 4.84 M/UL (ref 4.2–5.4)
SARS-COV-2, NAAT: NOT DETECTED
SODIUM BLD-SCNC: 142 MMOL/L (ref 136–145)
SPECIFIC GRAVITY UA: 1.02 (ref 1–1.03)
TOTAL PROTEIN: 7.1 G/DL (ref 6.6–8.7)
TRICYCLIC, URINE: NEGATIVE
UROBILINOGEN, URINE: 0.2 E.U./DL
WBC # BLD: 9.9 K/UL (ref 4.8–10.8)

## 2023-02-17 PROCEDURE — 99283 EMERGENCY DEPT VISIT LOW MDM: CPT

## 2023-02-17 PROCEDURE — 36415 COLL VENOUS BLD VENIPUNCTURE: CPT

## 2023-02-17 PROCEDURE — 87635 SARS-COV-2 COVID-19 AMP PRB: CPT

## 2023-02-17 PROCEDURE — 80053 COMPREHEN METABOLIC PANEL: CPT

## 2023-02-17 PROCEDURE — 80306 DRUG TEST PRSMV INSTRMNT: CPT

## 2023-02-17 PROCEDURE — 81003 URINALYSIS AUTO W/O SCOPE: CPT

## 2023-02-17 PROCEDURE — 85025 COMPLETE CBC W/AUTO DIFF WBC: CPT

## 2023-02-17 PROCEDURE — 82077 ASSAY SPEC XCP UR&BREATH IA: CPT

## 2023-02-17 RX ORDER — MIRTAZAPINE 15 MG/1
TABLET, FILM COATED ORAL
COMMUNITY
Start: 2023-02-09

## 2023-02-17 ASSESSMENT — ENCOUNTER SYMPTOMS
SHORTNESS OF BREATH: 0
ABDOMINAL PAIN: 0

## 2023-02-17 ASSESSMENT — PAIN - FUNCTIONAL ASSESSMENT: PAIN_FUNCTIONAL_ASSESSMENT: NONE - DENIES PAIN

## 2023-02-17 ASSESSMENT — LIFESTYLE VARIABLES
HOW MANY STANDARD DRINKS CONTAINING ALCOHOL DO YOU HAVE ON A TYPICAL DAY: PATIENT DOES NOT DRINK
HOW OFTEN DO YOU HAVE A DRINK CONTAINING ALCOHOL: NEVER

## 2023-02-17 NOTE — PROGRESS NOTES
SAFETY PLAN    A suicide Safety Plan is a document that supports someone when they are having thoughts of suicide. Warning Signs that indicate a suicidal crisis may be developing: What (situations, thoughts, feelings, body sensations, behaviors, etc.) do you experience that lets you know you are beginning to think about suicide? 1. none  2.   3. Internal Coping Strategies:  What things can I do (relaxation techniques, hobbies, physical activities, etc.) to take my mind off my problems without contacting another person? 1. no  2.   3.     People and social settings that provide distraction: Who can I call or where can I go to distract me? 1. Name: Hernesto De La Cruz Phone: [phone]  2. Name:   Phone:   3. Place:       4. Place:     People whom I can ask for help: Who can I call when I need help - for example, friends, family, clergy, someone else? 1. Name: Hernesto De La Cruz          Phone: [phone]  2. Name:                 Phone:   3. Name:                     Phone:       Professionals or [address] [place] [address] I can contact during a crisis: Who can I call for help - for example, my doctor, my psychiatrist, my psychologist, a mental health provider, a suicide hotline? 1. Clinician Name: [address]  Phone: [phone]      Clinician Pager or Emergency Contact #: [phone]    2. Clinician Name: [address]  Phone: [phone]      Clinician Pager or Emergency Contact #: [phone]    3. Suicide Prevention Lifeline: 7-304-118-TALK (3459)    4. [facility] Emergency Department  [address] [facility]    Making the environment safe: How can I make my environment (house/apartment/living space) safer? For example, can I remove guns, medications, and other items? 1. Remove weapons from the home  2. Remove extra medications from the home.     The One Thing that is most important to me and worth living for is: family

## 2023-02-17 NOTE — ED PROVIDER NOTES
[facility]  eMERGENCY dEPARTMENT eNCOUnter      Pt Name: Adithya Govea  MRN: [id number]  [place] 1950  Date of evaluation: 2/17/2023  Provider: Devonte Nava [facility]       Chief Complaint   Patient presents with    Mental Health Problem     Pt here from neuro office with increased movement and singing randomly. Pt unable to sit still in triage and  reports that she has been dx with alzheimer around the first of the year. Behaviors have been worse for 2 weeks         HISTORY OF PRESENT ILLNESS   (Location/Symptom, Timing/Onset,Context/Setting, Quality, Duration, Modifying Factors, Severity)  Note limiting factors. Adithya Govea is a 81 y.o. female with history including anemia, asthma, arthritis, hypertension, Alzheimer's disease, and insomnia who presents to the emergency department with complaint of mental health problem. Patient went to go see neurology earlier today and was sent to the ER. They note that the patient has been having increased abnormal movements and concern for possible hallucinations. They state that she will seem randomly. He notes over the last few days she is even started giving sequences of numbers. He states that she speaks constantly from the time she wakes up to the time she goes to bed. He states sometimes you can understand what she is saying but other times it is just a mumble. He denies any head trauma. He denies any change in her baseline confusion. He states it is gradually worsening over the last few weeks since she has been diagnosed with Alzheimer's. NursingNotes were reviewed. REVIEW OF SYSTEMS    (2-9 systems for level 4, 10 or more for level 5)     Review of Systems   Constitutional:  Negative for chills and fever. Respiratory:  Negative for shortness of breath. Cardiovascular:  Negative for chest pain. Gastrointestinal:  Negative for abdominal pain. Musculoskeletal:  Negative for myalgias.    Neurological:  Negative for headaches. Psychiatric/Behavioral:  Positive for agitation and hallucinations. Negative for self-injury, sleep disturbance and suicidal ideas. The patient is hyperactive. All other systems reviewed and are negative. PAST MEDICALHISTORY     Past Medical History:   Diagnosis Date    Abnormal brain MRI     AD (Alzheimer's disease) ([facility] [address]     Anemia     Arthritis     Asthma     Change in mental status     Cognitive changes     Hypertension     Insomnia     Memory loss     Psychiatric disturbance     Sleep difficulties     Transient alteration of awareness          SURGICAL HISTORY       Past Surgical History:   Procedure Laterality Date    HC INJECT OTHER PERPHRL NERV Bilateral 8/18/2017    BILATERAL FLURO GUIDED TROCHANTERIC BURSA HIP INJECTIONS performed by Santa Vega MD at [facility]     Previous Medications    ASPIRIN 81 MG EC TABLET    Take 81 mg by mouth daily    CHOLECALCIFEROL (VITAMIN D-3 PO)    Take by mouth    CLORAZEPATE DIPOTASSIUM (TRANXENE-T PO)    Take by mouth    DOXEPIN (SINEQUAN) 25 MG CAPSULE    Take 25 mg by mouth nightly    FENOFIBRATE PO    Take 160 mg by mouth daily    LORATADINE (CLARITIN) 10 MG TABLET    Take 10 mg by mouth daily    MELATONIN 5 MG TABS TABLET    Take 5 mg by mouth nightly    MEMANTINE (NAMENDA) 10 MG TABLET    Take 1 tablet by mouth 2 times daily    MIRTAZAPINE (REMERON) 15 MG TABLET    TAKE 1 TO 2 TABLETS BY MOUTH AT BEDTIME FOR SLEEP.  HOLD TRAZODONE    MOVANTIK 25 MG TABS TABLET        OLMESARTAN (BENICAR) 40 MG TABLET    Take 40 mg by mouth daily    OMEPRAZOLE 20 MG EC TABLET    Take 40 mg by mouth daily     ONDANSETRON (ZOFRAN ODT) 4 MG DISINTEGRATING TABLET    Take 1 tablet by mouth every 8 hours as needed for Nausea or Vomiting    PRAVASTATIN (PRAVACHOL) 40 MG TABLET        RISPERIDONE (RISPERDAL) 1 MG TABLET    TAKE 1-2 TABLETS BY MOUTH TWICE A DAY       ALLERGIES     Flagyl [metronidazole], Macrodantin [nitrofurantoin], Pcn [penicillins], and Sulfa antibiotics    FAMILY HISTORY     No family history on file. SOCIAL HISTORY       Social History     Socioeconomic History    Marital status:    Tobacco Use    Smoking status: Former     Types: Cigarettes     Quit date: 1/10/2006     Years since quittin.1    Smokeless tobacco: Never   Vaping Use    Vaping Use: Never used   Substance and Sexual Activity    Alcohol use: Not Currently    Drug use: Not Currently       SCREENINGS    Brogan Coma Scale  Eye Opening: Spontaneous  Best Verbal Response: Confused  Best Motor Response: Obeys commands  Brogan Coma Scale Score: 14        PHYSICAL EXAM    (up to 7 for level 4, 8 or more for level 5)     ED Triage Vitals [23 1005]   BP Temp Temp src Heart Rate Resp SpO2 Height Weight   (!) 169/91 98 °F (36.7 °C) -- 95 18 97 % -- --       Physical Exam  Vitals and nursing note reviewed. Constitutional:       General: She is not in acute distress. Appearance: Normal appearance. She is not ill-appearing, toxic-appearing or diaphoretic. HENT:      Head: Normocephalic and atraumatic. Cardiovascular:      Rate and Rhythm: Normal rate and regular rhythm. Pulses: Normal pulses. Pulmonary:      Effort: Pulmonary effort is normal. No respiratory distress. Chest:      Chest wall: No tenderness. Abdominal:      General: There is no distension. Palpations: Abdomen is soft. Tenderness: There is no abdominal tenderness. Musculoskeletal:      Cervical back: Normal range of motion and neck supple. Skin:     General: Skin is warm and dry. Neurological:      Mental Status: She is alert. Mental status is at baseline. Comments: At baseline per . Oriented to person. Knows she is in the hospital in [place]. Follows instructions. No tremors or abnormal movements on my exam. Patient is sitting quietly and will answer when asked questions.     Psychiatric:         Attention and Perception: Perception normal.         Mood and Affect: Mood is anxious. Affect is flat. Behavior: Behavior is not aggressive or combative. Behavior is cooperative. Thought Content: Thought content does not include homicidal or suicidal ideation. Thought content does not include homicidal or suicidal plan. Cognition and Memory: Cognition is impaired. Memory is impaired. DIAGNOSTIC RESULTS     LABS:  Labs Reviewed   COMPREHENSIVE METABOLIC PANEL - Abnormal; Notable for the following components:       Result Value    Potassium 3.1 (*)     All other components within normal limits   CBC WITH AUTO DIFFERENTIAL - Abnormal; Notable for the following components:    MCH 26.0 (*)     MCHC 31.4 (*)     RDW 14.6 (*)     Neutrophils % 81.9 (*)     Lymphocytes % 8.7 (*)     Neutrophils Absolute 8.1 (*)     Lymphocytes Absolute 0.9 (*)     All other components within normal limits   DRUG SCRN, BUPRENORPHINE - Abnormal; Notable for the following components:    Benzodiazepine Screen, Urine POSITIVE (*)     All other components within normal limits   COVID-19, RAPID   URINALYSIS   ETHANOL       All other labs were within normal range or not returned as of this dictation. EMERGENCY DEPARTMENT COURSE and DIFFERENTIAL DIAGNOSIS/MDM:   Vitals:    Vitals:    02/17/23 1005   BP: (!) 169/91   Pulse: 95   Resp: 18   Temp: 98 °F (36.7 °C)   SpO2: 97%       MDM  Patient is a 72-year-old female presents to the ER with concern for complications of her Alzheimer's. CBC does not show leukocytosis or anemia. CMP was negative for significant electrolyte disturbance, TANMAY, or LFT elevation. She is negative for COVID. Urinalysis does not show infection or hematuria. Alcohol negative. Drug screen positive for benzos. After medical clearance, [facility] evaluated the patient. The patient's  is adamant he would not like her to be admitted inpatient again after her previous inpatient psych stay.   He would prefer to follow-up outpatient with a psychiatry group to adjust her medications. He does not have concern for her safety at this time. She is not suicidal or homicidal.  She is at her neurological baseline since the diagnosis over the last few months. I have gone over strict return precautions and encouraged follow-up with both primary care and psych. He was given resources and safety contract with [facility]. Psychiatrist does not recommend that the patient requires inpatient placement or a involuntary hold at this time. All questions were answered. Return precautions were also given and he verbalized understanding. CONSULTS:  Dr Stevo Werner, Psychiatry    FINAL IMPRESSION      1.  Hallucinations          DISPOSITION/PLAN   DISPOSITION Decision To Discharge 02/17/2023 03:26:35 PM      PATIENT REFERRED TO:  Juan Pablo Tristan                    (Please note that portions of this note were completed with a voice recognition program.  Efforts were made to edit thedictations but occasionally words are mis-transcribed.)    SANTA Anderson (electronically signed)     Katharine Anderson  02/17/23 1600

## 2023-02-17 NOTE — PROGRESS NOTES
[facility] ADULT INITIAL INTAKE ASSESSMENT     2/17/23    [address] ,a 81 y.o. female, presents to the ED for a psychiatric assessment. ED Arrival time: 5767  ED physician:  Katharine Keller  [facility] Notification time: in ED  [facility] Assessment start time: 7212  Psychiatrist call time: [phone] with Dr. Stevo Ramos    Patient is referred by: neurologist office    Reason for visit to ED - Presenting problem:     PT spouse states reason for ED visit, \"Her normal behavior all day and half the night is a constant movement. \"    Pt seen in ED room 18, dressed in paper scrubs, with spouse at bedside. Agreeable to interview. Reports was at neurologists office and they sent her here to be evaluated for uncontrollable hand movements, singing, saying numbers and letters repetitiously. Reports it started real bad about a month and a half ago. Reports prior to that she was running all over the house and would fall or just lay down in the floor. Reports she has hit her head a couple times. Last CT scan was a month ago at [facility] and last MRI was a couple weeks ago at [address] [place] [address] sent her to [facility] about 3 months ago because she was aggressive and wanting to get outside to the point her spouse could not control her. Reports they changed her medication, but condition was not improved. Reports they took her off of trazodone and duloxetine and started her on remeron and risperdal. Reports it was after this that she started falling. Reports being diagnosed with Alzheimer's dementia at [facility]. Majority of information provided by spouse. Would only shake head 'yes/no' to questions. Lying in bed calm, cooperative, and alert. ED provider writes, Nabil Francois is a 81 y.o. female with history including anemia, asthma, arthritis, hypertension, Alzheimer's disease, and insomnia who presents to the emergency department with complaint of mental health problem.   Patient went to go see neurology earlier today and was sent to the ER. They note that the patient has been having increased abnormal movements and concern for possible hallucinations. They state that she will seem randomly. He notes over the last few days she is even started giving sequences of numbers. He states that she speaks constantly from the time she wakes up to the time she goes to bed. He states sometimes you can understand what she is saying but other times it is just a mumble. He denies any head trauma. He denies any change in her baseline confusion. He states it is gradually worsening over the last few weeks since she has been diagnosed with Alzheimer's. \"    Duration of symptoms: couple months    Current Stressors: health    C-SSRS Completed: yes  Risk Assessment Completed:yes     Provider notified of the C-SSRS Screening and Risk Assessment Findings:yes    SI:  denies   Plan:    Past SI attempts: no   If yes, when and how many times:  Describe suicide attempts:   HI: denies  If yes describe:   Delusions: denies  If yes describe:   Hallucinations: denies   If yes describe:   Risk of Harm to self: Self injurious/self mutilation behaviors: no   If yes explain:   Was it within the past 6 months:    Risk of Harm to others: no   If yes explain:   Was it within the past 6 months:    Trauma History: denies  Anxiety 1-10:  unable  Explain if increased:   Depression 1-10:  unable  Explain if increased:   Level of function outside hospital decreased: yes   If yes explain: requires assistance with dressing and bathing; able to feed self  Has patient been completing ADL's: yes, spouse reports he has to fight her to get a shower    Mental Status Evaluation:     Appearance:  age appropriate   Behavior:  Within Normal Limits   Speech:  Minimally verbal   Mood:  depressed and sad   Affect:  flat   Thought Process:  blocked   Thought Content:  DARIEL   Sensorium:  person and place   Cognition:  impaired due to dementia   Insight:  impaired due to dementia Psychiatric Hospitalizations: Yes   Where & [facility]: [facility]-Oct or Nov  Outpatient Psychiatric Treatment: denies    Family History:    Family history of mental illness: no   \"Depression\",\"Anxiety\",\"Bipolar\",\"Schizophrenia\",\"Borderline\",\"ADHD\"}  Family members with suicide attempt: no   If yes explain (attempted or completed):    Substance Abuse History:     SBIRT Completed: yes  Brief Intervention completed if needed:  (Yes/No)    Current ETOH LEVELS: < 10    ETOH Usage:     Amount drinking daily: denied    Date of last drink:   Longest period of sobriety:    Substance/Chemical Abuse/Recreational Drug History:  Substance used: denies  Date of last substance use: N/A  Tobacco Use: no, quit 12 years ago   History of rehab treatment: denies  How many times in rehab:  Last time in rehab:  Family history of substance abuse: denies    Opiates: It was discussed with pt they would not be receiving opiates unless they were within 3 days post surgery/acute injury. Patient voiced understanding and agreed. Psychiatric Review Of Systems:     Recent Sleep changes: yes, doesn't sleep much over last 3 months   Recent appetite changes: no   Recent weight changes/Pounds gained (+) or lost (-):       Medical History:     Medical Diagnosis/Issues: Alzheimers, HTN, asthma, anemia, arthritis  CT today in ED:no  Use of 02 or CPAP: no  Ambulatory: yes  Independent or Need assistance with Self Care: assistance     PCP: Von Sinclair DO     Current Medications:   Scheduled Meds: No current facility-administered medications for this encounter. Current Outpatient Medications:     mirtazapine (REMERON) 15 MG tablet, TAKE 1 TO 2 TABLETS BY MOUTH AT BEDTIME FOR SLEEP.  HOLD TRAZODONE, Disp: , Rfl:     risperiDONE (RISPERDAL) 1 MG tablet, TAKE 1-2 TABLETS BY MOUTH TWICE A DAY, Disp: , Rfl:     melatonin 5 MG TABS tablet, Take 5 mg by mouth nightly, Disp: , Rfl:     aspirin 81 MG EC tablet, Take 81 mg by mouth daily, Disp: , Rfl:     memantine (NAMENDA) 10 MG tablet, Take 1 tablet by mouth 2 times daily, Disp: 60 tablet, Rfl: 3    MOVANTIK 25 MG TABS tablet, , Disp: , Rfl:     pravastatin (PRAVACHOL) 40 MG tablet, , Disp: , Rfl:     doxepin (SINEQUAN) 25 MG capsule, Take 25 mg by mouth nightly, Disp: , Rfl:     loratadine (CLARITIN) 10 MG tablet, Take 10 mg by mouth daily, Disp: , Rfl:     olmesartan (BENICAR) 40 MG tablet, Take 40 mg by mouth daily, Disp: , Rfl:     ondansetron (ZOFRAN ODT) 4 MG disintegrating tablet, Take 1 tablet by mouth every 8 hours as needed for Nausea or Vomiting, Disp: 30 tablet, Rfl: 0    FENOFIBRATE PO, Take 160 mg by mouth daily, Disp: , Rfl:     omeprazole 20 MG EC tablet, Take 40 mg by mouth daily , Disp: , Rfl:     Clorazepate Dipotassium (TRANXENE-T PO), Take by mouth, Disp: , Rfl:     Cholecalciferol (VITAMIN D-3 PO), Take by mouth, Disp: , Rfl:        Collateral Information:     Name: Eileen Terry   Relationship: spouse  Phone Number:   Collateral: see above    Current living arrangement:lives at home with spouse  Current Support System: yes  Employment: denies    Disposition:     Choose one of the options below for disposition:     1. Decision to admit to :no    If yes, which unit Adult or Geriatric Unit:    Is patient voluntary:   If no has a 72 hold been initiated:   Admission Diagnosis:     Does the patient have a guardian or Medical POA:   Has the guardian been notified or Medical POA:       2. Decision to Discharge:   Does not meet criteria for acceptance to   unit due to: Denies SI, HI, AVH, and no psychosis present. Spouse wants to take patient home with resources to follow-up with. Other follow up information provided:  Provided a list of outpatient mental health providers. Safety plan complete and copy given to patient.      Tesha Corona RN

## 2023-02-17 NOTE — DISCHARGE INSTRUCTIONS
Follow up with your primary care provider in the next 3-5 days to ensure improvement. Return to the ER for new or worsening symptoms. I also strongly recommend follow up with the resources you were given by psych nurse in ER.

## 2023-02-17 NOTE — PROGRESS NOTES
[id number] [facility] Sleep [facility]  [address] [place] [address] Drive, [address]  Flower Tyrel michelle  Phone [phone]  Fax [phone] SLEEP    Patient: Clayton Calle  :  1950  Age:  81 y.o. MRN:  [id number]  Today: 23    Provider:  Justin Vora PA-C    Chief Complaint:  Chief Complaint   Patient presents with    New Patient     New to provider for sleep  Patient sees [facility] for Memory Loss       History Source: History obtained from chart review and ; unobtainable from pt due to mental status  PCP: Nury Phoenix DO     Referring Provider: GARY Joshi - CNP  [address] [place] [address] Drive  [address]    HISTORY OF PRESENT ILLNESS:   Clayton Calle is a 81y.o. year old female with a history of  has a past medical history of Abnormal brain MRI, AD (Alzheimer's disease) ([facility] [address] Anemia, Arthritis, Asthma, Change in mental status, Cognitive changes, Hypertension, Insomnia, Memory loss, Psychiatric disturbance, Sleep difficulties, and Transient alteration of awareness. who was referred for a sleep evaluation. Today her  reports that there has been significant change in her demeanor over the last 2 weeks. She has remained alert and oriented but has started having repetitive non-purposeful movements, palilalia, and echolalia. The duration is \"all day long until she falls asleep\". She has frequent awakenings and sleeps very little. She has a history of insomnia but the insomnia has worsened. She wanders throughout the night. He can redirect her and she will return to the bed, but within moments she is up again. She had the onset of memory loss, falls, and psychosis, 10/2022 and reported to [facility] ER for AMS. She had a negative workup and it was felt that she was having a psychiatric event. At some point, she was admitted to the [facility] inpatient psychiatric facility for 3 weeks. She reportedly had medication additions and changes. He thinks that she was diagnosed with AD.  She is on a number of psychiatric medications, presently. She was evaluated by Arthuro Favre, APRN, 1/10/2023 who has ordered an extensive workup, including labs, sleep medicine referral, Ophthalmology referral, EEG, Psychiatric referral, and was started on Namenda. It is noted in the record that the daughter had reported witnessed apneic episodes when the pt was napping. The pts  denies any witnessed apneas, gasping, or choking. She is reported to snore mildly. PAST MEDICAL HISTORY:    Medical History:  Past Medical History:   Diagnosis Date    Abnormal brain MRI     AD (Alzheimer's disease) ([facility] [address]     Anemia     Arthritis     Asthma     Change in mental status     Cognitive changes     Hypertension     Insomnia     Memory loss     Psychiatric disturbance     Sleep difficulties     Transient alteration of awareness        Surgical History:  Past Surgical History:   Procedure Laterality Date    HC INJECT OTHER PERPHRL NERV Bilateral 8/18/2017    BILATERAL FLURO GUIDED TROCHANTERIC BURSA HIP INJECTIONS performed by Ebony Siddiqi MD at [facility]       Current Medications:  Current Outpatient Medications   Medication Sig Dispense Refill    mirtazapine (REMERON) 15 MG tablet TAKE 1 TO 2 TABLETS BY MOUTH AT BEDTIME FOR SLEEP.  HOLD TRAZODONE      risperiDONE (RISPERDAL) 1 MG tablet TAKE 1-2 TABLETS BY MOUTH TWICE A DAY      melatonin 5 MG TABS tablet Take 5 mg by mouth nightly      aspirin 81 MG EC tablet Take 81 mg by mouth daily      memantine (NAMENDA) 10 MG tablet Take 1 tablet by mouth 2 times daily 60 tablet 3    MOVANTIK 25 MG TABS tablet       pravastatin (PRAVACHOL) 40 MG tablet       doxepin (SINEQUAN) 25 MG capsule Take 25 mg by mouth nightly      loratadine (CLARITIN) 10 MG tablet Take 10 mg by mouth daily      olmesartan (BENICAR) 40 MG tablet Take 40 mg by mouth daily      ondansetron (ZOFRAN ODT) 4 MG disintegrating tablet Take 1 tablet by mouth every 8 hours as needed for Nausea or Vomiting 30 tablet 0    FENOFIBRATE PO Take 160 mg by mouth daily      omeprazole 20 MG EC tablet Take 40 mg by mouth daily       Clorazepate Dipotassium (TRANXENE-T PO) Take by mouth      Cholecalciferol (VITAMIN D-3 PO) Take by mouth       No current facility-administered medications for this visit. Allergies:  Flagyl [metronidazole], Macrodantin [nitrofurantoin], Pcn [penicillins], and Sulfa antibiotics    SOCIAL HISTORY:   Social History     Substance and Sexual Activity   Alcohol Use Not Currently     Social History     Substance and Sexual Activity   Drug Use Not Currently     Social History     Tobacco Use   Smoking Status Former    Types: Cigarettes    Quit date: 1/10/2006    Years since quittin.1   Smokeless Tobacco Never       FAMILY HISTORY:   No family history on file. REVIEW OF SYSTEMS     Constitutional: []Fever []Sweats []Chills [] Recent Injury   [x] Denies all unless marked  HENT:[]Headache  [] Head Injury  [] Sore Throat  [] Ear Pain  [] Dizziness [] Hearing Loss   [x] Denies all unless marked  Musculoskeletal: [] Arthralgia  [] Myalgias [] Muscle cramps  [] Muscle twitches   [x] Denies all unless marked   Spine:  [] Neck pain  [] Back pain  [] Sciatica  [x] Denies all unless marked  Neurological:[] Visual Disturbance [] Double Vision [] Slurred Speech [] Trouble swallowing  [] Vertigo [] Tingling [] Numbness [] Weakness [] Loss of Balance   [] Loss of Consciousness [] Memory Loss [] Seizures  [x] Denies all unless marked  Psychiatric/Behavioral:[] Depression [] Anxiety  [x] Denies all unless marked  Sleep: []  Insomnia [x] Sleep Disturbance [] Snoring [] Restless Legs [] Daytime Sleepiness [] Sleep Apnea  [] Denies all unless marked      The MA has completed the ROS with the patient. I have reviewed it in its' entirety with the patient and agree with the documentation.        PHYSICAL EXAM  /78   Pulse 89   Ht 5' 2\" (1.575 m)   Wt 135 lb (61.2 kg)   SpO2 99%   BMI 24.69 kg/m² Constitutional -  Alert in NAD  HEENT- Conjunctiva normal.  No scars, masses, or lesions over external nose or ears, hearing intact, no neck masses noted, no jugular vein distension, no bruit  Cardiac- Regular rate and rhythm  Pulmonary- Clear to auscultation, good expansion, normal effort without use of accessory muscles  Musculoskeletal - No significant wasting of muscles noted, no bony deformities  Extremities - No clubbing, cyanosis or edema  Skin - Warm, dry, and intact. No rash, erythema, or pallor  Psychiatric - Mood, affect, and behavior appear normal      Neurological exam  Awake, alert, and oriented   Attention and concentration appear inappropriate, but easy to redirect   Speech is somewhat incoherent with noted palilalia and echolalia (compulsively repeating utterances; numbers, names)  Noted disorganized and abnormal motor behavior; hyperactive; engaged in repeated purposeless movement of her upper and lower extremities      Motor Exam  Antigravity throughout upper and lower extremities bilaterally    Tremors  No tremors in hands or head noted    Coordination  Finger to nose unremarkable   ZOLTAN unremarkable    Gait  Bizarre gait and balance instability noted    I reviewed the following studies:       [x]  :  Clinical laboratory test results-RPR, ESR, TSH, B1, B12, methylmalonic acid, CPR, CBC, CMP, and KAREN     []  :  Radiology reports                    [x]  :  Review and summarization of medical records-referring provider, GARY Plata     [x]     Request for medical records-previous records from [facility] psychiatric admission      []  :  Reviewed previous/recent polysomnogram report(s)      []  :  [place] Sleepiness Scale      IMPRESSION:    ICD-10-CM    1. Altered mental status, unspecified altered mental status type  R41.82       2. Abnormal movements  R25.9       3. Sleep disturbance  G47.9       4.  History of psychiatric hospitalization  Z86.59          Mark Stephen presented for a sleep evaluation. I immediately observed that the patient was exhibiting disorganized and abnormal motor behavior. Her  reported that the behaviors began about 2 weeks ago. No noted injury or change in diet or medication. No exposure to toxins, elicit drugs, or ETOH. There had been no fever, chills, or headache. She had not been noted to exhibit seizure like activity. She was alert and responsive. She as able to repeat my name and her husbands name. She was observed to engage in repeated, hyperactive, purposeless movement of the upper and lower extremities. She appeared to be alert and was responsive but her concentration appeared inappropriate. The behavior could be redirected but only for a moment. Her speech was somewhat incoherent with noted palilalia and echolalia (compulsively repeating utterances; numbers, names). She was able to ambulate independently but her gait was bizarre and unstable. Due to the exhaustive differential diagnoses in altered mental status, recent dx of AD, recent psychiatric hospital admission, myself and GARY Nicolas who also observed behaviors advised to report to the ED. We recommended ambulance transport, but her  declined and agreed to transport to ED per family car. Further sleep evaluation postponed at this time, as mental status change is of precedence. Her  voiced understanding with disposition. PLAN:  1. No orders of the defined types were placed in this encounter. 2.  Advised to report to the ED for further assessment of acute change in mental status with new onset purposeless movements; advised ambulance for transport, but her  declined ambulance but will transport to [facility] ED per car; GARY Nicolas contacted [facility] to advise of recommendation  3. Check the status of the previous Psychiatry referral as ordered by GARY Nicolas, 1/2023  4. Obtain [facility] admission records  5.   Keep previous scheduled follow up with GARY Cantu, 3/13/2023; if she indicates the need for assessment of possible sleep disorder then, will be glad to assess    I spent a total of 50 minutes in management and coordination of pt care.

## 2023-02-17 NOTE — ED NOTES
Paged [phone] [facility] (evaluation) for Melvi Urena  02/17/23 1122

## 2023-02-17 NOTE — PROGRESS NOTES
REVIEW OF SYSTEMS    Constitutional: []Fever []Sweats []Chills [] Recent Injury   [x] Denies all unless marked  HENT:[]Headache  [] Head Injury  [] Sore Throat  [] Ear Pain  [] Dizziness [] Hearing Loss   [x] Denies all unless marked  Musculoskeletal: [] Arthralgia  [] Myalgias [] Muscle cramps  [] Muscle twitches   [x] Denies all unless marked   Spine:  [] Neck pain  [] Back pain  [] Sciatica  [x] Denies all unless marked  Neurological:[] Visual Disturbance [] Double Vision [] Slurred Speech [] Trouble swallowing  [] Vertigo [] Tingling [] Numbness [] Weakness [] Loss of Balance   [] Loss of Consciousness [] Memory Loss [] Seizures  [x] Denies all unless marked  Psychiatric/Behavioral:[] Depression [] Anxiety  [x] Denies all unless marked  Sleep: []  Insomnia [x] Sleep Disturbance [] Snoring [] Restless Legs [] Daytime Sleepiness [] Sleep Apnea  [] Denies all unless marked

## 2023-03-23 ENCOUNTER — TELEPHONE (OUTPATIENT)
Dept: NEUROLOGY | Age: 73
End: 2023-03-23

## 2023-03-23 ENCOUNTER — TELEPHONE (OUTPATIENT)
Dept: PSYCHIATRY | Age: 73
End: 2023-03-23

## 2023-03-23 NOTE — TELEPHONE ENCOUNTER
Pt called to schedule an appt from a referral    Scheduled with Prince Daniels for 03/24/23 @ 2.     Electronically signed by Kellen Cuenca MA on 3/23/2023 at 2:08 PM

## 2023-03-23 NOTE — TELEPHONE ENCOUNTER
Called Josue Cross back to let him know we could see patient next week March 29 @ 9:30. He voiced understanding.

## 2023-03-24 ENCOUNTER — OFFICE VISIT (OUTPATIENT)
Dept: PSYCHIATRY | Age: 73
End: 2023-03-24
Payer: MEDICARE

## 2023-03-24 VITALS
OXYGEN SATURATION: 91 % | WEIGHT: 117.5 LBS | SYSTOLIC BLOOD PRESSURE: 102 MMHG | DIASTOLIC BLOOD PRESSURE: 74 MMHG | BODY MASS INDEX: 22.19 KG/M2 | HEIGHT: 61 IN | HEART RATE: 139 BPM | TEMPERATURE: 98.2 F

## 2023-03-24 DIAGNOSIS — G47.00 INSOMNIA, UNSPECIFIED TYPE: ICD-10-CM

## 2023-03-24 DIAGNOSIS — F03.918 DEMENTIA WITH BEHAVIORAL DISTURBANCE (HCC): Primary | ICD-10-CM

## 2023-03-24 DIAGNOSIS — F41.9 ANXIETY DISORDER, UNSPECIFIED TYPE: ICD-10-CM

## 2023-03-24 PROCEDURE — 1123F ACP DISCUSS/DSCN MKR DOCD: CPT

## 2023-03-24 PROCEDURE — 99215 OFFICE O/P EST HI 40 MIN: CPT

## 2023-03-24 RX ORDER — RISPERIDONE 0.5 MG/1
TABLET ORAL
Qty: 90 TABLET | Refills: 0 | Status: SHIPPED | OUTPATIENT
Start: 2023-03-24

## 2023-03-24 ASSESSMENT — PATIENT HEALTH QUESTIONNAIRE - PHQ9
9. THOUGHTS THAT YOU WOULD BE BETTER OFF DEAD, OR OF HURTING YOURSELF: 0
10. IF YOU CHECKED OFF ANY PROBLEMS, HOW DIFFICULT HAVE THESE PROBLEMS MADE IT FOR YOU TO DO YOUR WORK, TAKE CARE OF THINGS AT HOME, OR GET ALONG WITH OTHER PEOPLE: 1
5. POOR APPETITE OR OVEREATING: 0
6. FEELING BAD ABOUT YOURSELF - OR THAT YOU ARE A FAILURE OR HAVE LET YOURSELF OR YOUR FAMILY DOWN: 0
1. LITTLE INTEREST OR PLEASURE IN DOING THINGS: 3
SUM OF ALL RESPONSES TO PHQ QUESTIONS 1-9: 14
4. FEELING TIRED OR HAVING LITTLE ENERGY: 3
SUM OF ALL RESPONSES TO PHQ QUESTIONS 1-9: 14
2. FEELING DOWN, DEPRESSED OR HOPELESS: 2
7. TROUBLE CONCENTRATING ON THINGS, SUCH AS READING THE NEWSPAPER OR WATCHING TELEVISION: 0
SUM OF ALL RESPONSES TO PHQ QUESTIONS 1-9: 14
3. TROUBLE FALLING OR STAYING ASLEEP: 3
SUM OF ALL RESPONSES TO PHQ9 QUESTIONS 1 & 2: 5
8. MOVING OR SPEAKING SO SLOWLY THAT OTHER PEOPLE COULD HAVE NOTICED. OR THE OPPOSITE, BEING SO FIGETY OR RESTLESS THAT YOU HAVE BEEN MOVING AROUND A LOT MORE THAN USUAL: 3
SUM OF ALL RESPONSES TO PHQ QUESTIONS 1-9: 14

## 2023-03-24 NOTE — PROGRESS NOTES
towards the patient)    (delusions of control - e.g., believing one's thoughts and movements are controlled by planetary overlords)     MENTAL STATUS EXAMINATION    Appearance: Appropriately groomed. Made good eye contact. Appears stated age. Gait stable. Patient moving hands back and forth repeatedly while seated. Behavior: Calm, cooperative, and socially appropriate. No psychomotor retardation/agitation appreciated. Speech: Normal in tone, volume, and quality. No slurring, dysarthria or pressured speech noted. Mood: \"Some depression\"   Affect: Mood congruent. Thought Process: Appears linear, logical and goal oriented. Causality appears intact. Thought Content: Denies active suicidal and homicidal ideations. No overt delusions or paranoia appreciated. Perceptions: Denies auditory or visual hallucinations at present time. Not responding to internal stimuli. Concentration: Intact. Orientation: to person, place, date, and situation. Language: Intact. Fund of information: Intact. Memory: Poor recent and remote. Impulsivity: Limited.     Insight: Limited  Judgment: Impaired due to cognitive impairment    Cognition:    Can spell \"world\" backwards: No     Can do serial 7's: No    Lab Results   Component Value Date     02/17/2023    K 3.1 (L) 02/17/2023     02/17/2023    CO2 29 02/17/2023    BUN 18 02/17/2023    CREATININE 0.6 02/17/2023    GLUCOSE 106 02/17/2023    CALCIUM 9.7 02/17/2023    PROT 7.1 02/17/2023    LABALBU 4.0 02/17/2023    BILITOT <0.2 02/17/2023    ALKPHOS 72 02/17/2023    AST 15 02/17/2023    ALT 15 02/17/2023    LABGLOM >60 02/17/2023    GFRAA >59 01/10/2022     Lab Results   Component Value Date/Time     02/17/2023 10:30 AM    K 3.1 02/17/2023 10:30 AM     02/17/2023 10:30 AM    CO2 29 02/17/2023 10:30 AM    BUN 18 02/17/2023 10:30 AM    CREATININE 0.6 02/17/2023 10:30 AM    GLUCOSE 106 02/17/2023 10:30 AM    CALCIUM 9.7 02/17/2023 10:30 AM      No

## 2023-04-06 ENCOUNTER — TELEPHONE (OUTPATIENT)
Dept: PSYCHIATRY | Age: 73
End: 2023-04-06

## 2023-04-06 NOTE — TELEPHONE ENCOUNTER
Called to remind pt of her appt for 4/7 @ 2:30 PM    Pt stated that \"she have to cancel her appt and she will call back to reschedule\"    Electronically signed by Brianne Prakash on 4/6/2023 at 1:51 PM

## 2023-04-17 ENCOUNTER — TRANSCRIBE ORDERS (OUTPATIENT)
Dept: GENERAL RADIOLOGY | Facility: HOSPITAL | Age: 73
End: 2023-04-17
Payer: MEDICARE

## 2023-04-17 ENCOUNTER — HOSPITAL ENCOUNTER (OUTPATIENT)
Dept: GENERAL RADIOLOGY | Facility: HOSPITAL | Age: 73
Discharge: HOME | End: 2023-04-17
Admitting: FAMILY MEDICINE
Payer: MEDICARE

## 2023-04-17 DIAGNOSIS — R06.00 DYSPNEA, UNSPECIFIED TYPE: Primary | ICD-10-CM

## 2023-04-17 DIAGNOSIS — R06.00 DYSPNEA, UNSPECIFIED TYPE: ICD-10-CM

## 2023-04-17 PROCEDURE — 71046 X-RAY EXAM CHEST 2 VIEWS: CPT

## 2023-05-20 DIAGNOSIS — R41.3 MEMORY LOSS: ICD-10-CM

## 2023-05-20 DIAGNOSIS — R41.89 COGNITIVE CHANGES: ICD-10-CM

## 2023-05-22 RX ORDER — MEMANTINE HYDROCHLORIDE 10 MG/1
TABLET ORAL
Qty: 60 TABLET | Refills: 3 | Status: SHIPPED | OUTPATIENT
Start: 2023-05-22

## 2023-05-22 NOTE — TELEPHONE ENCOUNTER
Requested Prescriptions     Pending Prescriptions Disp Refills    memantine (NAMENDA) 10 MG tablet [Pharmacy Med Name: MEMANTINE HYDROCHLORIDE 10MG TABLET] 60 tablet 3     Sig: TAKE 1 TABLET BY MOUTH TWICE DAILY.        Last Office Visit: 1/10/2023  Next Office Visit: Visit date not found  Last Medication Refill: 1/10/23 with 3 RF

## 2023-08-07 ENCOUNTER — OFFICE VISIT (OUTPATIENT)
Dept: NEUROLOGY | Age: 73
End: 2023-08-07
Payer: MEDICARE

## 2023-08-07 VITALS
HEIGHT: 61 IN | OXYGEN SATURATION: 99 % | DIASTOLIC BLOOD PRESSURE: 59 MMHG | BODY MASS INDEX: 22.09 KG/M2 | SYSTOLIC BLOOD PRESSURE: 92 MMHG | WEIGHT: 117 LBS | HEART RATE: 105 BPM

## 2023-08-07 DIAGNOSIS — F03.92 DEMENTIA WITH PSYCHOTIC DISTURBANCE, UNSPECIFIED DEMENTIA SEVERITY, UNSPECIFIED DEMENTIA TYPE (HCC): Primary | ICD-10-CM

## 2023-08-07 DIAGNOSIS — G47.00 INSOMNIA, UNSPECIFIED TYPE: ICD-10-CM

## 2023-08-07 DIAGNOSIS — R41.3 MEMORY LOSS: ICD-10-CM

## 2023-08-07 DIAGNOSIS — R41.89 COGNITIVE CHANGES: ICD-10-CM

## 2023-08-07 PROCEDURE — G8400 PT W/DXA NO RESULTS DOC: HCPCS | Performed by: NURSE PRACTITIONER

## 2023-08-07 PROCEDURE — 1036F TOBACCO NON-USER: CPT | Performed by: NURSE PRACTITIONER

## 2023-08-07 PROCEDURE — G8420 CALC BMI NORM PARAMETERS: HCPCS | Performed by: NURSE PRACTITIONER

## 2023-08-07 PROCEDURE — 3017F COLORECTAL CA SCREEN DOC REV: CPT | Performed by: NURSE PRACTITIONER

## 2023-08-07 PROCEDURE — 1090F PRES/ABSN URINE INCON ASSESS: CPT | Performed by: NURSE PRACTITIONER

## 2023-08-07 PROCEDURE — 99214 OFFICE O/P EST MOD 30 MIN: CPT | Performed by: NURSE PRACTITIONER

## 2023-08-07 PROCEDURE — 1123F ACP DISCUSS/DSCN MKR DOCD: CPT | Performed by: NURSE PRACTITIONER

## 2023-08-07 PROCEDURE — G8427 DOCREV CUR MEDS BY ELIG CLIN: HCPCS | Performed by: NURSE PRACTITIONER

## 2023-08-07 RX ORDER — HYDROXYZINE HYDROCHLORIDE 25 MG/1
25 TABLET, FILM COATED ORAL 2 TIMES DAILY PRN
COMMUNITY
Start: 2023-07-13

## 2023-08-07 RX ORDER — DONEPEZIL HYDROCHLORIDE 5 MG/1
5 TABLET, FILM COATED ORAL NIGHTLY
Qty: 30 TABLET | Refills: 3 | Status: SHIPPED | OUTPATIENT
Start: 2023-08-07

## 2023-08-07 RX ORDER — MEMANTINE HYDROCHLORIDE 10 MG/1
10 TABLET ORAL 2 TIMES DAILY
Qty: 60 TABLET | Refills: 3 | Status: SHIPPED | OUTPATIENT
Start: 2023-08-07

## 2023-08-07 RX ORDER — OMEPRAZOLE 40 MG/1
40 CAPSULE, DELAYED RELEASE ORAL DAILY
COMMUNITY
Start: 2023-07-25

## 2023-08-07 RX ORDER — TRAZODONE HYDROCHLORIDE 50 MG/1
50 TABLET ORAL NIGHTLY
Qty: 90 TABLET | Refills: 1 | Status: SHIPPED | OUTPATIENT
Start: 2023-08-07

## 2023-08-07 RX ORDER — FENOFIBRATE 160 MG/1
160 TABLET ORAL DAILY
COMMUNITY
Start: 2023-07-25

## 2023-08-07 NOTE — PROGRESS NOTES
REVIEW OF SYSTEMS    Constitutional: []Fever []Sweat []Chills [] Recent Injury [x] Denies all unless marked  HEENT:[]Headache  [] Head Injury/Hearing Loss  [] Sore Throat  [] Ear Ache/Dizziness  [x] Denies all unless marked  Spine:  [] Neck pain  [x] Back pain  [] Sciaticia  [x] Denies all unless marked  Cardiovascular:[]Heart Disease []Chest Pain [] Palpitations  [x] Denies all unless marked  Pulmonary: []Shortness of Breath []Cough   [x] Denies all unless marke  Gastrointestinal: []Nausea  []Vomiting  []Abdominal Pain  []Constipation  []Diarrhea  []Dark Bloody Stools  [x] Denies all unless marked  Psychiatric/Behavioral:[] Depression [] Anxiety [x] Denies all unless marked  Genitourinary:   [] Frequency  [] Urgency  [] Incontinence [] Pain with Urination  [x] Denies all unless marked  Extremities: []Pain  []Swelling  [x] Denies all unless marked  Musculoskeletal: [] Muscle Pain  [] Joint Pain  [] Arthritis [] Muscle Cramps [] Muscle Twitches  [x] Denies all unless marked  Sleep: [] Insomnia [] Snoring [] Restless Legs [] Sleep Apnea  [] Daytime Sleepiness  [x] Denies all unless marked  Skin:[] Rash [] Skin Discoloration [x] Denies all unless marked   Neurological: [x]Visual Disturbance/Memory Loss [] Loss of Balance [] Slurred Speech/Weakness [] Seizures  [] Vertigo/Dizziness [x] Denies all unless marked
including age, sleep disturbance, underlying dementia possible but felt there is psychiatric component as well. There is family history of dementia in mother. Will add in Aricept. They are also requesting medication to help with her sleeping. Will trial low-dose trazodone.  would also like assistance with care, will refer to [address]. ICD-10-CM    1. Dementia with psychotic disturbance, unspecified dementia severity, unspecified dementia type ([facility])  F03.92 memantine (NAMENDA) 10 MG tablet     Robert Elizalde APRN, Palliative Care, [place]     donepezil (ARICEPT) 5 MG tablet      2. Memory loss  R41.3 memantine (NAMENDA) 10 MG tablet      3. Cognitive changes  R41.89 memantine (NAMENDA) 10 MG tablet      4. Insomnia, unspecified type  G47.00 traZODone (DESYREL) 50 MG tablet          PLAN:  Referral to GARY Arcos to assist with care. EEG as previously ordered. Continue Namenda 10 mg twice daily. Added Aricept 5 mg nightly. Side effects discussed. Add in trazodone 50 mg nightly to help with insomnia. Side effects discussed. Sedation and fall risk discussed. Follow-up with sleep to further assess insomnia/possible ELODIA. Strongly recommend psychiatry to be on board as I feel a lot of her symptoms are psychiatric, she declines this. Sleep referral to assess for insomnia/ELODIA. Monitor hypotension; increase fluids, stand and move slowly. Fall precautions discussed. 11. Return in about 4 months (around 12/7/2023). GARY Payne    I spent a total of 35 minutes for this encounter including chart review, management, and coordination of patient care. This dictation was generated by voice recognition computer software. Although all attempts were made to edit the dictation for accuracy, there may be errors in the transcription that are not intended.

## 2023-08-15 ENCOUNTER — TELEPHONE (OUTPATIENT)
Dept: PALLATIVE CARE | Age: 73
End: 2023-08-15

## 2023-08-15 NOTE — TELEPHONE ENCOUNTER
I left a voicemail for the patient to call [facility]. I would like to discuss the referral that we received. I will call back at a later date. I can be reached at [phone].

## 2023-09-05 ENCOUNTER — OFFICE VISIT (OUTPATIENT)
Dept: PALLATIVE CARE | Age: 73
End: 2023-09-05

## 2023-09-05 VITALS
HEART RATE: 99 BPM | TEMPERATURE: 97.5 F | SYSTOLIC BLOOD PRESSURE: 90 MMHG | DIASTOLIC BLOOD PRESSURE: 50 MMHG | RESPIRATION RATE: 14 BRPM | OXYGEN SATURATION: 93 %

## 2023-09-05 DIAGNOSIS — Z91.89 AT HIGH RISK FOR SKIN BREAKDOWN: ICD-10-CM

## 2023-09-05 DIAGNOSIS — F03.92 DEMENTIA WITH PSYCHOTIC DISTURBANCE, UNSPECIFIED DEMENTIA SEVERITY, UNSPECIFIED DEMENTIA TYPE (HCC): ICD-10-CM

## 2023-09-05 DIAGNOSIS — Z51.5 ENCOUNTER FOR PALLIATIVE CARE: ICD-10-CM

## 2023-09-05 DIAGNOSIS — R68.89 TOTAL SELF-CARE DEFICIT: ICD-10-CM

## 2023-09-05 DIAGNOSIS — R41.82 ALTERED MENTAL STATUS, UNSPECIFIED ALTERED MENTAL STATUS TYPE: Primary | ICD-10-CM

## 2023-09-05 DIAGNOSIS — Z91.81 AT RISK FOR FALLS: ICD-10-CM

## 2023-09-06 ENCOUNTER — TELEPHONE (OUTPATIENT)
Dept: PALLATIVE CARE | Age: 73
End: 2023-09-06

## 2023-09-06 ENCOUNTER — APPOINTMENT (OUTPATIENT)
Dept: GENERAL RADIOLOGY | Facility: HOSPITAL | Age: 73
End: 2023-09-06
Payer: MEDICARE

## 2023-09-06 ENCOUNTER — HOSPITAL ENCOUNTER (EMERGENCY)
Facility: HOSPITAL | Age: 73
Discharge: HOME | End: 2023-09-06
Attending: STUDENT IN AN ORGANIZED HEALTH CARE EDUCATION/TRAINING PROGRAM
Payer: MEDICARE

## 2023-09-06 ENCOUNTER — APPOINTMENT (OUTPATIENT)
Dept: CT IMAGING | Facility: HOSPITAL | Age: 73
End: 2023-09-06
Payer: MEDICARE

## 2023-09-06 VITALS
OXYGEN SATURATION: 99 % | HEART RATE: 78 BPM | TEMPERATURE: 98 F | RESPIRATION RATE: 20 BRPM | DIASTOLIC BLOOD PRESSURE: 76 MMHG | HEIGHT: 62 IN | WEIGHT: 135 LBS | SYSTOLIC BLOOD PRESSURE: 122 MMHG | BODY MASS INDEX: 24.84 KG/M2

## 2023-09-06 DIAGNOSIS — R41.82 ALTERED MENTAL STATUS, UNSPECIFIED ALTERED MENTAL STATUS TYPE: Primary | ICD-10-CM

## 2023-09-06 DIAGNOSIS — R41.82 ALTERED MENTAL STATUS, UNSPECIFIED ALTERED MENTAL STATUS TYPE: ICD-10-CM

## 2023-09-06 DIAGNOSIS — I63.9 CEREBROVASCULAR ACCIDENT (CVA), UNSPECIFIED MECHANISM: ICD-10-CM

## 2023-09-06 DIAGNOSIS — R53.1 UNILATERAL WEAKNESS: Primary | ICD-10-CM

## 2023-09-06 LAB
ALBUMIN SERPL-MCNC: 4 G/DL (ref 3.5–5.2)
ALBUMIN/GLOB SERPL: 1.5 G/DL
ALP SERPL-CCNC: 60 U/L (ref 39–117)
ALT SERPL W P-5'-P-CCNC: 16 U/L (ref 1–33)
AMPHET+METHAMPHET UR QL: NEGATIVE
AMPHETAMINES UR QL: NEGATIVE
ANION GAP SERPL CALCULATED.3IONS-SCNC: 13 MMOL/L (ref 5–15)
AST SERPL-CCNC: 29 U/L (ref 1–32)
BARBITURATES UR QL SCN: NEGATIVE
BASOPHILS # BLD AUTO: 0.06 10*3/MM3 (ref 0–0.2)
BASOPHILS NFR BLD AUTO: 0.8 % (ref 0–1.5)
BENZODIAZ UR QL SCN: NEGATIVE
BILIRUB SERPL-MCNC: 0.4 MG/DL (ref 0–1.2)
BILIRUB UR QL STRIP: NEGATIVE
BUN SERPL-MCNC: 16 MG/DL (ref 8–23)
BUN/CREAT SERPL: 23.2 (ref 7–25)
BUPRENORPHINE SERPL-MCNC: NEGATIVE NG/ML
CALCIUM SPEC-SCNC: 9.6 MG/DL (ref 8.6–10.5)
CANNABINOIDS SERPL QL: NEGATIVE
CHLORIDE SERPL-SCNC: 105 MMOL/L (ref 98–107)
CLARITY UR: CLEAR
CO2 SERPL-SCNC: 24 MMOL/L (ref 22–29)
COCAINE UR QL: NEGATIVE
COLOR UR: YELLOW
CREAT SERPL-MCNC: 0.69 MG/DL (ref 0.57–1)
DEPRECATED RDW RBC AUTO: 42.9 FL (ref 37–54)
EGFRCR SERPLBLD CKD-EPI 2021: 92.9 ML/MIN/1.73
EOSINOPHIL # BLD AUTO: 0.13 10*3/MM3 (ref 0–0.4)
EOSINOPHIL NFR BLD AUTO: 1.7 % (ref 0.3–6.2)
ERYTHROCYTE [DISTWIDTH] IN BLOOD BY AUTOMATED COUNT: 14.2 % (ref 12.3–15.4)
FENTANYL UR-MCNC: NEGATIVE NG/ML
GLOBULIN UR ELPH-MCNC: 2.7 GM/DL
GLUCOSE SERPL-MCNC: 123 MG/DL (ref 65–99)
GLUCOSE UR STRIP-MCNC: NEGATIVE MG/DL
HCT VFR BLD AUTO: 38.5 % (ref 34–46.6)
HGB BLD-MCNC: 11.9 G/DL (ref 12–15.9)
HGB UR QL STRIP.AUTO: NEGATIVE
IMM GRANULOCYTES # BLD AUTO: 0.01 10*3/MM3 (ref 0–0.05)
IMM GRANULOCYTES NFR BLD AUTO: 0.1 % (ref 0–0.5)
INR PPP: 0.98 (ref 0.91–1.09)
KETONES UR QL STRIP: NEGATIVE
LEUKOCYTE ESTERASE UR QL STRIP.AUTO: NEGATIVE
LYMPHOCYTES # BLD AUTO: 1.08 10*3/MM3 (ref 0.7–3.1)
LYMPHOCYTES NFR BLD AUTO: 14.3 % (ref 19.6–45.3)
MAGNESIUM SERPL-MCNC: 2 MG/DL (ref 1.6–2.4)
MCH RBC QN AUTO: 25.6 PG (ref 26.6–33)
MCHC RBC AUTO-ENTMCNC: 30.9 G/DL (ref 31.5–35.7)
MCV RBC AUTO: 83 FL (ref 79–97)
METHADONE UR QL SCN: NEGATIVE
MONOCYTES # BLD AUTO: 0.89 10*3/MM3 (ref 0.1–0.9)
MONOCYTES NFR BLD AUTO: 11.8 % (ref 5–12)
NEUTROPHILS NFR BLD AUTO: 5.36 10*3/MM3 (ref 1.7–7)
NEUTROPHILS NFR BLD AUTO: 71.3 % (ref 42.7–76)
NITRITE UR QL STRIP: NEGATIVE
NRBC BLD AUTO-RTO: 0 /100 WBC (ref 0–0.2)
OPIATES UR QL: POSITIVE
OXYCODONE UR QL SCN: NEGATIVE
PCP UR QL SCN: NEGATIVE
PH UR STRIP.AUTO: 6.5 [PH] (ref 5–8)
PLATELET # BLD AUTO: 282 10*3/MM3 (ref 140–450)
PMV BLD AUTO: 10.9 FL (ref 6–12)
POTASSIUM SERPL-SCNC: 3.9 MMOL/L (ref 3.5–5.2)
PROPOXYPH UR QL: NEGATIVE
PROT SERPL-MCNC: 6.7 G/DL (ref 6–8.5)
PROT UR QL STRIP: NEGATIVE
PROTHROMBIN TIME: 13.1 SECONDS (ref 11.8–14.8)
RBC # BLD AUTO: 4.64 10*6/MM3 (ref 3.77–5.28)
SODIUM SERPL-SCNC: 142 MMOL/L (ref 136–145)
SP GR UR STRIP: 1.01 (ref 1–1.03)
TRICYCLICS UR QL SCN: NEGATIVE
UROBILINOGEN UR QL STRIP: NORMAL
WBC NRBC COR # BLD: 7.53 10*3/MM3 (ref 3.4–10.8)

## 2023-09-06 PROCEDURE — 85025 COMPLETE CBC W/AUTO DIFF WBC: CPT | Performed by: STUDENT IN AN ORGANIZED HEALTH CARE EDUCATION/TRAINING PROGRAM

## 2023-09-06 PROCEDURE — 81003 URINALYSIS AUTO W/O SCOPE: CPT | Performed by: STUDENT IN AN ORGANIZED HEALTH CARE EDUCATION/TRAINING PROGRAM

## 2023-09-06 PROCEDURE — 99284 EMERGENCY DEPT VISIT MOD MDM: CPT

## 2023-09-06 PROCEDURE — 80307 DRUG TEST PRSMV CHEM ANLYZR: CPT | Performed by: STUDENT IN AN ORGANIZED HEALTH CARE EDUCATION/TRAINING PROGRAM

## 2023-09-06 PROCEDURE — 70486 CT MAXILLOFACIAL W/O DYE: CPT

## 2023-09-06 PROCEDURE — 85610 PROTHROMBIN TIME: CPT | Performed by: STUDENT IN AN ORGANIZED HEALTH CARE EDUCATION/TRAINING PROGRAM

## 2023-09-06 PROCEDURE — P9612 CATHETERIZE FOR URINE SPEC: HCPCS

## 2023-09-06 PROCEDURE — 71045 X-RAY EXAM CHEST 1 VIEW: CPT

## 2023-09-06 PROCEDURE — 72125 CT NECK SPINE W/O DYE: CPT

## 2023-09-06 PROCEDURE — 80053 COMPREHEN METABOLIC PANEL: CPT | Performed by: STUDENT IN AN ORGANIZED HEALTH CARE EDUCATION/TRAINING PROGRAM

## 2023-09-06 PROCEDURE — 70450 CT HEAD/BRAIN W/O DYE: CPT

## 2023-09-06 PROCEDURE — 36415 COLL VENOUS BLD VENIPUNCTURE: CPT

## 2023-09-06 PROCEDURE — 83735 ASSAY OF MAGNESIUM: CPT | Performed by: STUDENT IN AN ORGANIZED HEALTH CARE EDUCATION/TRAINING PROGRAM

## 2023-09-06 RX ADMIN — SODIUM CHLORIDE, POTASSIUM CHLORIDE, SODIUM LACTATE AND CALCIUM CHLORIDE 1000 ML: 600; 310; 30; 20 INJECTION, SOLUTION INTRAVENOUS at 17:05

## 2023-09-06 NOTE — ED PROVIDER NOTES
Subjective   History of Present Illness  Patient presents due to fall, altered mental status.  She fell 3 days ago.  She had bruising on her face.  She has been minimally responsive since then.  Has a history of Alzheimer's.  Is currently being assessed for hospice care due to advanced Alzheimer's.  She can answer questions, has had minimal amounts to eat and drink, has been stuck in the bed.  She gets home health about once a week.  Her and her  have decided that she would not want to live with advanced Alzheimer's that she has now and they have been pursuing hospice care for this reason.  The  is curious if she could have a reversible cause; states that her doctor sent her here to get imaging done to look for any injuries to the brain that could potentially be reversible.  He says he is interested in getting information so that he can decide how to best take care of her although he wants limited medical testing because he wants limited medical intervention and says that she would not have a general wish for brain surgeries or any extraordinary care.    No vomiting.  No fevers.  Urinating defecating normally.  She has not complained of pain anywhere including her abdomen or chest.  No trouble breathing.  No syncope.    Patient denies pain anywhere.  Can answer her name.  Gives her birthdate whenever asked the year.  Says she is at [facility] whenever she is asked where she is.    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     Depression     Esophageal stricture     Fatty liver     Fibromyalgia     GERD (gastroesophageal reflux disease)     Hyperlipidemia     Hypertension     IBS (irritable bowel syndrome)     Insomnia     Ischemic colitis     Leukocytosis     Rectal bleeding     Restless leg syndrome     RLS (restless legs syndrome)         Allergies   Allergen Reactions    Macrodantin [Nitrofurantoin] Nausea And Vomiting and Unknown - Low Severity    Metronidazole Nausea And Vomiting and Unknown - Low Severity    Tetracyclines & Related Nausea And Vomiting     unknown    Azithromycin Rash    Penicillins Rash    Sulfa Antibiotics Rash       Past Surgical History:   Procedure Laterality Date    APPENDECTOMY      BREAST EXCISIONAL BIOPSY Left     Dr. Dahl    CATARACT EXTRACTION      CHOLECYSTECTOMY      COLONOSCOPY  10/14/2011    adenomatous polyp    COLONOSCOPY      ENDOSCOPY  09/04/2012    normal    HERNIA REPAIR      HIP SURGERY      HYSTERECTOMY      LIPOMA EXCISION      TUMOR EXCISION         Family History   Problem Relation Age of Onset    Breast cancer Maternal Cousin     Colon cancer Neg Hx        Social History     Socioeconomic History    Marital status:    Tobacco Use    Smokeless tobacco: Never   Substance and Sexual Activity    Alcohol use: No    Drug use: No           Objective   Physical Exam  Vitals reviewed.   Constitutional:       Comments: Resting with eyes closed, responds to voice without hesitation, gives confused answers.   HENT:      Head: Normocephalic.   Eyes:      Extraocular Movements: Extraocular movements intact.      Conjunctiva/sclera: Conjunctivae normal.      Pupils: Pupils are equal, round, and reactive to light.   Cardiovascular:      Pulses: Normal pulses.      Heart sounds: Normal heart sounds.   Pulmonary:      Effort: Pulmonary effort is normal. No respiratory distress.   Abdominal:      General: Abdomen is flat. There is no distension.   Musculoskeletal:      Cervical back: Normal range of motion and neck supple.      Right lower leg: No edema.      Left lower leg: No edema.   Skin:     General: Skin is warm and dry.   Neurological:      Mental Status: She is disoriented.      Cranial Nerves: No cranial nerve deficit.      Comments: Squeezes bilateral hands on command.  Wiggles her toes on the left  on command.  When asked, does not wiggle her toes on the right.  Does not reliably answer sensory questions.  No hemineglect.   Psychiatric:         Behavior: Behavior normal.         Thought Content: Thought content normal.   Scalp atraumatic.   Forehead with bruising, stable to palpation, nontender.   Midface with bruising, nontender, no injuries noted.   No intraoral injuries noted.   Atraumatic labs, pupils equal round reactive, no dilated pupil.  No otorrhea.   Trachea midline, breath sounds were noted bilaterally. PERRL.    Cervical spine: no midline tenderness to palpation. No paraspinal tenderness to palpation.  Thoracic spine: no midline tenderness to palpation. No paraspinal tenderness to palpation.  Lumbar spine: no midline tenderness to palpation. No paraspinal tenderness to palpation.  Spine stable with no palpable deformity or step-off    Clavicles stable and nontender to AP and lateral compression.  Chest stable and nontender to AP and lateral compression.  Pelvis stable and nontender to lateral compression.    Extremities are warm, well-perfused with capillary refill intact and no gross motor deficits.    Procedures           ED Course                                           Medical Decision Making  Problems Addressed:  Altered mental status, unspecified altered mental status type: complicated acute illness or injury  Cerebrovascular accident (CVA), unspecified mechanism: complicated acute illness or injury    Amount and/or Complexity of Data Reviewed  Labs: ordered.  Radiology: ordered.      Shannon Bridges is a 73 y.o. female with PMH above who presents to the Emergency Department with AMS. Limited GOC with no heroic measures requested.  Understandably, her  wishes for further testing to look for the cause of the sudden mental status change.  Intracranial injury is possible.  She has a history of psychiatric disorder, was admitted to the psych unit last year, he says she has been taking all of  her psych medications as prescribed and denies any acute psychiatric changes.     ED Course:   -labs unremarkable  -CT imaging shows a stroke which would be consistent with the patient's mental status and leg weakness.  No other acute injuries.  Her  ultimately decided against full body scanning; initially he requested that I check for any possible reversible cause and to consider things like spinal fractures with her leg weakness however he then resumed a train of thought that was more like the patient's goals of care which would be to keep her comfortable.  He says that she would not want to stay in a hospital or nursing facility and has been that clear whenever she was lucid.  Therefore they desire to take her home.  They are arranging hospice care at home and part of the paperwork would be imaging of the brain which they now have and they feel comfortable with this.  I did offer to admit the patient to the hospital for care, but they declined.  They understand return precautions and understand that they can come back for admission for further care if needed.  The  and the daughter clearly expressed comfort goals of care and declined admission for stroke for any further stroke work-up or testing or management.  They will call her doctor about arranging hospice at home.      Final diagnosis: Stroke    All questions answered. Patient/family was understanding and in agreement with today's assessment and plan. The patient was monitored during their stay in the ED and dispositioned without acute event.    Electronically signed by:  Jhoan Matthew MD 9/6/2023 21:17 CDT      Note: Dragon medical dictation software was used in the creation of this note.      Final diagnoses:   Altered mental status, unspecified altered mental status type   Cerebrovascular accident (CVA), unspecified mechanism       ED Disposition  ED Disposition       ED Disposition   Discharge    Condition   Stable    Comment   --                Kisha Byrne,   [address]  [address]  [facility] [address]  [phone]               Medication List      No changes were made to your prescriptions during this visit.            Jhoan Matthew MD  09/06/23 [id number]

## 2023-09-06 NOTE — PROGRESS NOTES
Discussed patient with Simeon Gamble nurse practitioner. She was at patient's residence last night and patient has had a significant change in her baseline. Per her assessment she is now nonambulatory with right upper extremity weakness. Patient and family declined ER evaluation yesterday. Her primary care doctor is also aware. I strongly recommend ER evaluation as it sounds like she has had an acute neurologic event. I am also agreeable to order stat imaging and blood work as well. We will place orders for this but given discussion feel she needs emergent care. Will plan to follow-up with patient and Dr. Sierra Hidalgo today as well.

## 2023-09-06 NOTE — TELEPHONE ENCOUNTER
I called patient's spouse to check on her and to ask if he had the opportunity to talk to Dr. Ashlyn Johnson or her staff today. He had not talked to them. I advised him that I did discuss current concerns with [address] [place] [address] with neurology this morning. She recommended patient go to ED or at least have stat imaging and labs done. She did place orders. We discussed how options today are basically the same as the ones we discussed yesterday and include going to ED for eval to gain more information as she might have something going on that could be corrected or reversed, remaining home and having hospice eval if Dr. Ashlyn Johnson agrees. Outpatient work up would be difficult due to her basically bedbound status. Discussed concern that her condition could decline further and risk of death. He said he has been thinking more about it and \"I think we'll go to ER. Can we go tomorrow? \"  I advised if he wants her evaluated she should go to ED today and not wait. He voiced understanding and says he will call ambulance and have her taken to [facility] ED. He reported her condition today is about the same as yesterday. Still requiring total care. He fed her oatmeal, Boost, water, and she took her pills earlier without difficulty. I called Frank Varghese MA with Dr. Diamante Ruffin and updated her with plan for patient to go to ED. Report given to Toby Briscoe at [facility] ED.

## 2023-09-07 ENCOUNTER — TELEPHONE (OUTPATIENT)
Dept: NEUROLOGY | Age: 73
End: 2023-09-07

## 2023-09-07 ENCOUNTER — TELEPHONE (OUTPATIENT)
Dept: PALLATIVE CARE | Age: 73
End: 2023-09-07

## 2023-09-07 NOTE — TELEPHONE ENCOUNTER
Patient did go to ED yesterday and diagnosed with CVA. ED notes, imaging reports and labs reviewed. No significant lab findings. Per ED notes patient's  elected to take her home and pursue hospice. I left a message for Edwina Mullen MA with Dr. Nae Lynn updating them re: ED visit and asked if hospice okay with Dr. Nae Lynn. Edwina Mullen returned my call this afternoon. Dr. Nae Lynn agrees with hospice and will sign order and CTI. Discussed above with GARY Mcconnell (neurology). Patient's step daughter Mala Alcala left a message earlier this afternoon requesting call back. I called and spoke to Mr. Batista and Mala Alcala as they put me on speaker phone. They shared information obtained from ED. It was a long day for them yesterday Mr. Batista seemed to appreciate having more information regarding her current status. We discussed hospice services again today in more detail including Medicare hospice benefit, philosophy of hospice, interdisciplinary team, 24/7 call coverage/availability. He wants to proceed with hospice.      Hospice intake notified of referral.

## 2023-09-07 NOTE — TELEPHONE ENCOUNTER
called to discuss care plan for patient. They have been discharged from ER to home. She is nonambulatory, is having to use ambulance for transport. Has been diagnosed with CVA in ER based off of CT head. CT head notes indeterminate large area of hypoattenuation centered in the white matter of the right occipital lobe and temporal lobe. There is also some indeterminate abnormal hypoattenuation in the left occipital lobe and there is a hypoattenuating 16mm lesion in the right thalamus. Findings may represent areas of subacute to chronic ischemia although underlying mass lesions are also possible. Patient has known dementia and patient along with  and daughter would like hospice care. They would like for her to be at home. She has right-sided weakness, incontinence of bowel and bladder, worsened confusion. She is able to eat and drink. Discussed that I will further review records and have CT head images pushed over for review. I will also discuss case with Annie Johnson in palliative care. I feel it would be reasonable to start hospice, will plan to touch base with family again tomorrow once I have reviewed CT head images.  is in agreement and voiced understanding, questions answered.

## 2023-09-07 NOTE — TELEPHONE ENCOUNTER
Patients step  daughter called office. She voiced that her and patients  where confused and overwhelmed and unsure what to do next. She voiced that Pili Crowley had been out to the house and seen patient. She voiced that Mo Villanueva had reached out Dr. Tiesha Carter about hospice for patient but that Dr. Brittany Galan would not sign paper work without patient having MRI completed. Iker brito voiced that they were advised to take patient to the ER. She voiced they went to [facility] ER and where she she had CT of Head completed and was determined that patient has had a stroke. She voiced that they were discharged and are now uncertain on who to contact or how to get patient to Hospice. Step daughter voiced that patient is completely unable to ambulate and is incontinent of urine and stool. Also that patient is having to be transported by ambulance. I advised that unfortunately she was no on patients HIPAA  and I would not be able to speak with her about patient. She voiced her understanding and voiced when patients  returned from his own appointment they would give us a call back.

## 2023-09-07 NOTE — DISCHARGE INSTRUCTIONS
Please bring Ms. Bridges back for discomfort or pain or if you have trouble caring for her or controlling symptoms of discomfort.      The result from her CT head is included below.  FINDINGS:     No evidence of intracranial hemorrhage. No loss of gray-white  differentiation. Large area of hypoattenuation in the white matter of  the RIGHT occipital and temporal lobes, new from prior study. There is  also some new hypoattenuation within the LEFT occipital white matter. 16  mm hypodense lesion in the RIGHT thalamus. No midline shift or mass  effect. Lateral ventricles are nondilated. Basilar cisterns are patent.  No acute orbital finding. Mastoid air cells are clear. Visualized  paranasal sinuses are clear. No acute osseous finding.     IMPRESSION:     1.  No acute intracranial hemorrhage.      2.  Indeterminate large area of hypoattenuation centered in the white  matter of the RIGHT occipital and temporal lobe. There is also some  indeterminate abnormal hypoattenuation in the LEFT occipital lobe and  there is a hypoattenuating 16 mm lesion in the RIGHT thalamus. These  findings may represent areas of subacute to chronic ischemia although  underlying mass lesions are also possible. Recommend further evaluation  with brain MRI without and with IV contrast.